# Patient Record
Sex: FEMALE | Race: BLACK OR AFRICAN AMERICAN | Employment: FULL TIME | URBAN - METROPOLITAN AREA
[De-identification: names, ages, dates, MRNs, and addresses within clinical notes are randomized per-mention and may not be internally consistent; named-entity substitution may affect disease eponyms.]

---

## 2020-06-12 ENCOUNTER — HOSPITAL ENCOUNTER (EMERGENCY)
Age: 50
Discharge: HOME | End: 2020-06-12
Attending: EMERGENCY MEDICINE
Payer: COMMERCIAL

## 2020-06-12 ENCOUNTER — TELEPHONE (OUTPATIENT)
Dept: PRIMARY CARE CLINIC | Age: 50
End: 2020-06-12

## 2020-06-12 VITALS
OXYGEN SATURATION: 100 % | WEIGHT: 179.1 LBS | DIASTOLIC BLOOD PRESSURE: 64 MMHG | TEMPERATURE: 97.6 F | HEIGHT: 67 IN | HEART RATE: 91 BPM | SYSTOLIC BLOOD PRESSURE: 133 MMHG | RESPIRATION RATE: 18 BRPM | BODY MASS INDEX: 28.11 KG/M2

## 2020-06-12 PROCEDURE — 99284 EMERGENCY DEPT VISIT MOD MDM: CPT

## 2020-06-12 RX ORDER — ZOLPIDEM TARTRATE 10 MG/1
10 TABLET ORAL NIGHTLY PRN
Qty: 7 TABLET | Refills: 0 | Status: SHIPPED | OUTPATIENT
Start: 2020-06-12 | End: 2020-06-19

## 2020-06-12 RX ORDER — SENNA PLUS 8.6 MG/1
1 TABLET ORAL 2 TIMES DAILY
Qty: 60 TABLET | Refills: 11 | Status: SHIPPED | OUTPATIENT
Start: 2020-06-12 | End: 2020-06-18

## 2020-06-12 RX ORDER — POLYETHYLENE GLYCOL 3350 17 G/17G
17 POWDER, FOR SOLUTION ORAL DAILY
Qty: 1 BOTTLE | Refills: 0 | Status: SHIPPED | OUTPATIENT
Start: 2020-06-12 | End: 2020-06-18

## 2020-06-12 RX ORDER — FERROUS GLUCONATE 324(37.5)
324 TABLET ORAL 2 TIMES DAILY
Qty: 60 TABLET | Refills: 0 | Status: SHIPPED | OUTPATIENT
Start: 2020-06-12 | End: 2020-07-16 | Stop reason: SDUPTHER

## 2020-06-12 ASSESSMENT — PAIN SCALES - GENERAL: PAINLEVEL_OUTOF10: 8

## 2020-06-12 ASSESSMENT — PAIN DESCRIPTION - LOCATION: LOCATION: CHEST

## 2020-06-12 ASSESSMENT — PAIN DESCRIPTION - PAIN TYPE: TYPE: ACUTE PAIN

## 2020-06-12 ASSESSMENT — PAIN DESCRIPTION - FREQUENCY: FREQUENCY: INTERMITTENT

## 2020-06-12 ASSESSMENT — PAIN DESCRIPTION - DESCRIPTORS: DESCRIPTORS: PRESSURE

## 2020-06-12 NOTE — ED PROVIDER NOTES
Smokeless tobacco: Never Used   Substance Use Topics    Alcohol use: Not Currently    Drug use: No         EXAM:   Presentation Vital Signs: /64   Pulse 91   Temp 97.6 °F (36.4 °C) (Oral)   Resp 18   Ht 5' 7\" (1.702 m)   Wt 81.2 kg (179 lb 1.6 oz)   LMP 05/20/2020   SpO2 100%   BMI 28.05 kg/m²     General: Well nourished, no acute distress  Head: No traumatic injury  ENT: MMM, no facial asymmetry, no nasal discharge  Eyes: EOM  Neck: No tracheal deviation or stridor  Lungs: Respirations clear to ausculation, no respiratory distress  Cardiac: Regular rate and rhythm without gallops, murmurs, or rubs  Abdomen: Soft, nontender  Skin: no pallor, erythema, lesions or other abnormalities on exposed skin of face and arms  Extremities: Normal ROM of bilateral upper extremities at shoulders, elbows, wrists; normal ROM of bilateral LE at hips and knees. Neurologic: Alert, oriented x 3. No focal deficits upon moving arms and legs  Psychiatric: anxious, teary, denies si/hi/avh      MEDICAL DECISION MAKING  1) Iron deficiency anemia--No acute cardiovascular emergency --VSS, hr good, no signs of CHF. 2) Fatigue--likely from iron deficiency anemia. Taking iron--will rx ferrous gluconate as she may better tolerate    3) Insomnia--ambien x 7 days rx    4) Depressed Mood--likely from very poor sleep x 1 week. No si/hi/avh.      5) constipation--rx laxatives    DISPOSITION  Home    IMPRESSION:  1. Iron deficiency anemia due to chronic blood loss    2. Generalized weakness    3. Insomnia due to medical condition    4. Depressed mood    5. Drug-induced constipation            This medical chart used with aid of transcription software. As such, there may be inadvertent errors in transcription of spellings and words despite physician's attempts to correct all possible errors.          Saranya Méndez MD  06/12/20 2169

## 2020-06-12 NOTE — TELEPHONE ENCOUNTER
The following patient is requesting a new patient appointment    With Provider: Roel Dexter    Insurance: [facility]    Medications / Conditions / Complaint :   Iron, melatonin, ambien, ferrous gluconate, anemic     Preferred Days: tuesdays and thursdays    Preferred Time: morning  Best Contact Number: [phone]    [facility] earlier today

## 2020-06-16 ENCOUNTER — NURSE TRIAGE (OUTPATIENT)
Dept: OTHER | Facility: CLINIC | Age: 50
End: 2020-06-16

## 2020-06-16 ENCOUNTER — TELEPHONE (OUTPATIENT)
Dept: PRIMARY CARE CLINIC | Age: 50
End: 2020-06-16

## 2020-06-17 ENCOUNTER — OFFICE VISIT (OUTPATIENT)
Dept: PRIMARY CARE CLINIC | Age: 50
End: 2020-06-17
Payer: COMMERCIAL

## 2020-06-17 VITALS — HEART RATE: 76 BPM | OXYGEN SATURATION: 97 % | TEMPERATURE: 98.2 F

## 2020-06-17 PROCEDURE — G8428 CUR MEDS NOT DOCUMENT: HCPCS | Performed by: INTERNAL MEDICINE

## 2020-06-17 PROCEDURE — 99211 OFF/OP EST MAY X REQ PHY/QHP: CPT | Performed by: INTERNAL MEDICINE

## 2020-06-17 PROCEDURE — 1036F TOBACCO NON-USER: CPT | Performed by: INTERNAL MEDICINE

## 2020-06-17 PROCEDURE — G8419 CALC BMI OUT NRM PARAM NOF/U: HCPCS | Performed by: INTERNAL MEDICINE

## 2020-06-17 NOTE — PROGRESS NOTES
6/17/2020    FLU/COVID-19 CLINIC EVALUATION    HPI  SYMPTOMS:    Symptom duration, days:  [] 1   [] 2   [] 3   [] 4   [] 5   [] 6   [] 7   [x] 8   [] 9   [] 10   [] 11   [] 12   [] 13 [] 14 +      Symptom course:   [] Worsening     [] Stable     [] Improving    [x] Fevers  [] Symptom (not measured)  [x] Measured (Result:101 F )  [x] Chills  [x] Cough  [] Coughing up blood  [] Productive  [] Dry  [] Chest Congestion  [] Chest Tightness  [] Nasal Congestion  [] Runny Nose  [] Feeling short of breath  [] Fatigue  [] Chest pain  [] Headaches  []Tolerable  [] Severe  [] Sore throat  [x] Muscle aches  [] Nausea  [] Decreased appetite  [] Vomiting  []Unable to keep fluids down  [] Diarrhea  []Severe    [] OTHER SYMPTOMS:      RISK FACTORS:  [] Pregnant or possibly pregnant  [] Age over 61  [] Diabetes  [] Heart disease  [] Asthma  [] COPD/Other chronic lung diseases  [] Active Cancer  [] On Chemotherapy  [] Taking oral steroids  [] History Lymphoma/Leukemia  [] Close contact with a lab confirmed COVID-19 patient within 14 days of symptom onset  [] History of travel from affected geographical areas within 14 days of symptom onset  [] Health Care Worker Exposure no symptoms  [] Health Care Worker Exposure symptomatic      VITALS:  Vitals:    06/17/20 0908   Pulse: 76   Temp: 98.2 °F (36.8 °C)   SpO2: 97%        PHYSICAL EXAMINATION:  [x]Alert   [x]Oriented to person/place/time    [x]No apparent distress     []Toxic appearing    [x]Breathing appears normal     []Appears tachypneic         [x]Speaking in full sentences     TESTS:  POCT FLU:  [] Positive     []Negative  POCT STREP:  [] Positive     []Negative    [x] COVID-19 Test sent: [x] Blue   [] Red   [] Chest X-ray     ASSESSMENT:  [] Flu  [] Strep Throat  [] Uncertain Viral Respiratory Illness  [x] Possible COVID-19  [] Exposure COVID-19  [] Other:     PLAN:  [x] Discharge home with written instructions for:  [] Flu management  [] Strep throat management  [] Possible

## 2020-06-18 ENCOUNTER — CARE COORDINATION (OUTPATIENT)
Dept: CARE COORDINATION | Age: 50
End: 2020-06-18

## 2020-06-18 ENCOUNTER — VIRTUAL VISIT (OUTPATIENT)
Dept: PRIMARY CARE CLINIC | Age: 50
End: 2020-06-18
Payer: COMMERCIAL

## 2020-06-18 PROCEDURE — G8428 CUR MEDS NOT DOCUMENT: HCPCS | Performed by: FAMILY MEDICINE

## 2020-06-18 PROCEDURE — 1036F TOBACCO NON-USER: CPT | Performed by: FAMILY MEDICINE

## 2020-06-18 PROCEDURE — 99203 OFFICE O/P NEW LOW 30 MIN: CPT | Performed by: FAMILY MEDICINE

## 2020-06-18 PROCEDURE — G8419 CALC BMI OUT NRM PARAM NOF/U: HCPCS | Performed by: FAMILY MEDICINE

## 2020-06-18 RX ORDER — HYDROXYZINE HYDROCHLORIDE 25 MG/1
25 TABLET, FILM COATED ORAL EVERY 6 HOURS PRN
Qty: 30 TABLET | Refills: 0 | Status: SHIPPED | OUTPATIENT
Start: 2020-06-18 | End: 2020-07-01

## 2020-06-18 RX ORDER — VENLAFAXINE HYDROCHLORIDE 37.5 MG/1
37.5 CAPSULE, EXTENDED RELEASE ORAL DAILY
Qty: 30 CAPSULE | Refills: 3 | Status: SHIPPED | OUTPATIENT
Start: 2020-06-18 | End: 2020-07-30 | Stop reason: SDUPTHER

## 2020-06-18 RX ORDER — HYDROXYZINE HYDROCHLORIDE 25 MG/1
25 TABLET, FILM COATED ORAL EVERY 6 HOURS PRN
COMMUNITY
Start: 2020-06-17 | End: 2020-06-18

## 2020-06-18 NOTE — PROGRESS NOTES
2020    TELEHEALTH EVALUATION -- Audio/Visual (During EAKEK-95 public health emergency)    HPI:    Nika Holden (:  1970) has requested an audio/video evaluation for the following concern(s):    HFU and to establish care. Anxiety: has always had trouble getting her anxiety under control. Was feeling ill a couple months ago and finally went to [facility] and was diagnosed with anemia. She is very worried about her health now. COVID has thrown her life for a loop. She is worried about her kids and having trouble controlling her anxiety. She is asking for xanax. She didn't realize this was only short term. Depression: she has been very sad for years. It is to the point where she is always tearful and her kids are noticing that she isnt in her right mind. Anemia: usually feels tired. Taking iron supplements. Review of Systems   Constitutional: Positive for appetite change and fatigue. Negative for chills and fever. HENT: Negative for congestion. Eyes: Negative for pain and visual disturbance. Respiratory: Negative for cough and shortness of breath. Cardiovascular: Negative for chest pain and palpitations. Gastrointestinal: Negative for abdominal pain, constipation and diarrhea. Genitourinary: Negative for difficulty urinating. Musculoskeletal: Negative for arthralgias. Skin: Negative for rash and wound. Neurological: Positive for weakness and headaches. Negative for dizziness and light-headedness. Hematological: Does not bruise/bleed easily. Psychiatric/Behavioral: Positive for agitation and sleep disturbance. Negative for behavioral problems, self-injury and suicidal ideas. The patient is nervous/anxious. Prior to Visit Medications    Medication Sig Taking?  Authorizing Provider   venlafaxine (EFFEXOR XR) 37.5 MG extended release capsule Take 1 capsule by mouth daily Yes Inderjit Dorantes MD   hydrOXYzine (ATARAX) 25 MG tablet Take 1 tablet by mouth every 6 hours

## 2020-06-19 ENCOUNTER — TELEPHONE (OUTPATIENT)
Dept: PRIMARY CARE CLINIC | Age: 50
End: 2020-06-19

## 2020-06-19 PROBLEM — D50.0 IRON DEFICIENCY ANEMIA DUE TO CHRONIC BLOOD LOSS: Status: ACTIVE | Noted: 2020-06-19

## 2020-06-19 PROBLEM — G47.01 INSOMNIA DUE TO MEDICAL CONDITION: Status: ACTIVE | Noted: 2020-06-19

## 2020-06-19 PROBLEM — K59.03 DRUG-INDUCED CONSTIPATION: Status: ACTIVE | Noted: 2020-06-19

## 2020-06-19 PROBLEM — R53.1 ASTHENIA: Status: ACTIVE | Noted: 2020-06-19

## 2020-06-19 LAB
SARS-COV-2: NOT DETECTED
SOURCE: NORMAL

## 2020-06-22 ENCOUNTER — OFFICE VISIT (OUTPATIENT)
Dept: PRIMARY CARE CLINIC | Age: 50
End: 2020-06-22
Payer: COMMERCIAL

## 2020-06-22 ENCOUNTER — TELEPHONE (OUTPATIENT)
Dept: PRIMARY CARE CLINIC | Age: 50
End: 2020-06-22

## 2020-06-22 VITALS
WEIGHT: 172 LBS | HEART RATE: 75 BPM | TEMPERATURE: 98.1 F | DIASTOLIC BLOOD PRESSURE: 76 MMHG | SYSTOLIC BLOOD PRESSURE: 131 MMHG | BODY MASS INDEX: 27 KG/M2 | HEIGHT: 67 IN

## 2020-06-22 PROCEDURE — 1036F TOBACCO NON-USER: CPT | Performed by: FAMILY MEDICINE

## 2020-06-22 PROCEDURE — G8419 CALC BMI OUT NRM PARAM NOF/U: HCPCS | Performed by: FAMILY MEDICINE

## 2020-06-22 PROCEDURE — G8427 DOCREV CUR MEDS BY ELIG CLIN: HCPCS | Performed by: FAMILY MEDICINE

## 2020-06-22 PROCEDURE — 99214 OFFICE O/P EST MOD 30 MIN: CPT | Performed by: FAMILY MEDICINE

## 2020-06-22 RX ORDER — OMEPRAZOLE 20 MG/1
20 CAPSULE, DELAYED RELEASE ORAL
Qty: 90 CAPSULE | Refills: 1 | Status: SHIPPED | OUTPATIENT
Start: 2020-06-22 | End: 2020-07-30

## 2020-06-22 RX ORDER — TRAZODONE HYDROCHLORIDE 50 MG/1
50 TABLET ORAL NIGHTLY
Qty: 30 TABLET | Refills: 0 | Status: SHIPPED | OUTPATIENT
Start: 2020-06-22 | End: 2020-10-08

## 2020-06-22 ASSESSMENT — ENCOUNTER SYMPTOMS
CONSTIPATION: 0
EYE PAIN: 0
COUGH: 0
DIARRHEA: 0
SHORTNESS OF BREATH: 0
ABDOMINAL PAIN: 0

## 2020-06-23 NOTE — PROGRESS NOTES
Gastroenteritis     SVT (supraventricular tachycardia) (HCC)      No past surgical history on file. Medications:   Med list was reviewed/updated in the chart. No Known Allergies    Physical Examination:  /76   Pulse 75   Temp 98.1 °F (36.7 °C)   Ht 5' 7\" (1.702 m)   Wt 172 lb (78 kg)   LMP 06/17/2020   BMI 26.94 kg/m²   Physical Exam  Vitals signs reviewed. Constitutional:       General: She is not in acute distress. Appearance: Normal appearance. She is not ill-appearing. HENT:      Head: Normocephalic and atraumatic. Right Ear: Tympanic membrane, ear canal and external ear normal.      Left Ear: Tympanic membrane, ear canal and external ear normal.      Nose: Nose normal. No congestion. Mouth/Throat:      Mouth: Mucous membranes are moist.      Pharynx: Oropharynx is clear. No oropharyngeal exudate. Eyes:      Extraocular Movements: Extraocular movements intact. Conjunctiva/sclera: Conjunctivae normal.      Pupils: Pupils are equal, round, and reactive to light. Neck:      Musculoskeletal: Normal range of motion and neck supple. Cardiovascular:      Rate and Rhythm: Normal rate and regular rhythm. Pulses: Normal pulses. Heart sounds: Normal heart sounds. Pulmonary:      Effort: Pulmonary effort is normal. No respiratory distress. Breath sounds: Normal breath sounds. No stridor. No wheezing, rhonchi or rales. Abdominal:      General: Bowel sounds are normal.      Palpations: Abdomen is soft. Tenderness: There is no abdominal tenderness. Musculoskeletal: Normal range of motion. Right lower leg: No edema. Left lower leg: No edema. Skin:     General: Skin is warm. Capillary Refill: Capillary refill takes less than 2 seconds. Findings: No erythema or rash. Neurological:      General: No focal deficit present. Mental Status: She is alert and oriented to person, place, and time. Mental status is at baseline.       Motor: No weakness. Coordination: Coordination normal.      Gait: Gait normal.   Psychiatric:         Behavior: Behavior normal.         Thought Content: Thought content normal.         Point of Care labs:  No results found for this or any previous visit (from the past 24 hour(s)). Assessment/Plan:  Maninder Jessica is a 51 y.o. female who presents to clinic for low energy level and fatigue in the setting of anemia, depression and not sleeping or eating well. 1. Low energy  Will continue taking Effexor to treat depression. We will continue to eat small meals or try meal replacement shakes like boost.  We will try trazodone to help her sleep. Continue follow-up with gynecology regarding ultrasound and reason for anemia. Continue iron supplement. 2. Decreased appetite  As above    3. Sleep trouble  As above  - traZODone (DESYREL) 50 MG tablet; Take 1 tablet by mouth nightly  Dispense: 30 tablet; Refill: 0    4. Moderate episode of recurrent major depressive disorder ([address]  As above. Follow-up with Dr. Elaine Maxwell. - traZODone (DESYREL) 50 MG tablet; Take 1 tablet by mouth nightly  Dispense: 30 tablet; Refill: 0    5. Iron deficiency anemia due to chronic blood loss  As above    6. Gastroesophageal reflux disease without esophagitis  We will try trial of Prilosec  Follow-up in 4 to 6 weeks  - omeprazole (PRILOSEC) 20 MG delayed release capsule; Take 1 capsule by mouth every morning (before breakfast)  Dispense: 90 capsule;  Refill: 1      Follow Up: 4 to 6 weeks    Future Appointments   Date Time Provider Sander Medellin   7/1/2020  2:30 PM MD Solis Talbert   7/6/2020 10:00 AM SERVANDO Sultana MD  6/22/2020

## 2020-06-25 ENCOUNTER — TELEPHONE (OUTPATIENT)
Dept: PRIMARY CARE CLINIC | Age: 50
End: 2020-06-25

## 2020-07-01 ENCOUNTER — OFFICE VISIT (OUTPATIENT)
Dept: PRIMARY CARE CLINIC | Age: 50
End: 2020-07-01
Payer: COMMERCIAL

## 2020-07-01 VITALS
HEART RATE: 116 BPM | HEIGHT: 67 IN | WEIGHT: 169 LBS | BODY MASS INDEX: 26.53 KG/M2 | DIASTOLIC BLOOD PRESSURE: 69 MMHG | TEMPERATURE: 98.9 F | SYSTOLIC BLOOD PRESSURE: 101 MMHG

## 2020-07-01 LAB
ANISOCYTOSIS: ABNORMAL
BASOPHILS ABSOLUTE: 0.1 K/UL (ref 0–0.2)
BASOPHILS RELATIVE PERCENT: 1.4 %
EOSINOPHILS ABSOLUTE: 0 K/UL (ref 0–0.6)
EOSINOPHILS RELATIVE PERCENT: 0.9 %
HCT VFR BLD CALC: 36 % (ref 36–48)
HEMATOLOGY PATH CONSULT: YES
HEMOGLOBIN: 10 G/DL (ref 12–16)
HYPOCHROMIA: ABNORMAL
LYMPHOCYTES ABSOLUTE: 0.7 K/UL (ref 1–5.1)
LYMPHOCYTES RELATIVE PERCENT: 14.5 %
MCH RBC QN AUTO: 19 PG (ref 26–34)
MCHC RBC AUTO-ENTMCNC: 27.9 G/DL (ref 31–36)
MCV RBC AUTO: 68 FL (ref 80–100)
MICROCYTES: ABNORMAL
MONOCYTES ABSOLUTE: 0.3 K/UL (ref 0–1.3)
MONOCYTES RELATIVE PERCENT: 7.2 %
NEUTROPHILS ABSOLUTE: 3.6 K/UL (ref 1.7–7.7)
NEUTROPHILS RELATIVE PERCENT: 76 %
PDW BLD-RTO: 27.1 % (ref 12.4–15.4)
PLATELET # BLD: 351 K/UL (ref 135–450)
PMV BLD AUTO: 9 FL (ref 5–10.5)
RBC # BLD: 5.28 M/UL (ref 4–5.2)
WBC # BLD: 4.8 K/UL (ref 4–11)

## 2020-07-01 PROCEDURE — G8428 CUR MEDS NOT DOCUMENT: HCPCS | Performed by: FAMILY MEDICINE

## 2020-07-01 PROCEDURE — 99213 OFFICE O/P EST LOW 20 MIN: CPT | Performed by: FAMILY MEDICINE

## 2020-07-01 PROCEDURE — 1036F TOBACCO NON-USER: CPT | Performed by: FAMILY MEDICINE

## 2020-07-01 PROCEDURE — G8419 CALC BMI OUT NRM PARAM NOF/U: HCPCS | Performed by: FAMILY MEDICINE

## 2020-07-01 ASSESSMENT — ENCOUNTER SYMPTOMS
ABDOMINAL PAIN: 0
DIARRHEA: 0
SHORTNESS OF BREATH: 0
EYE PAIN: 0
CONSTIPATION: 0
COUGH: 0

## 2020-07-01 NOTE — PROGRESS NOTES
Chief Complaint   Patient presents with    Anemia    Fatigue    Anorexia    Anxiety       HPI:  Rosa Guthrie is a 51 y.o. (: 1970) here today for 2 week follow up for depression. LOV, encouraged her to continue effexor and trazodone and follow up with Dr. Lauren Cornejo. Her appointment with Dr. Lauren Cornejo is on 2020. She is still worried about her health and what her hemoglobin is. She is not sure why she is losing weight even though she states that she is not eating. She is asking for more Ensure shakes. Once we discussed that her weight loss is likely due to decreased calorie intake, her mind was eased. Her anxiety level is improving and she is getting some sleep. She is taking trazodone on an as-needed basis. She is taking Effexor, every day. She is also complaining of some reproducible chest discomfort over her breastbone. She has not tried anything for this. No shortness of breath or generalized chest pain or pressure. No palpitations. Review of Systems   Constitutional: Positive for fatigue. Negative for appetite change, chills and fever. HENT: Negative for congestion. Eyes: Negative for pain and visual disturbance. Respiratory: Negative for cough and shortness of breath. Cardiovascular: Negative for chest pain and palpitations. Gastrointestinal: Negative for abdominal pain, constipation and diarrhea. Genitourinary: Negative for difficulty urinating. Musculoskeletal: Negative for arthralgias. Chest wall pain   Skin: Negative for rash and wound. Neurological: Positive for weakness. Negative for dizziness, light-headedness and headaches. Hematological: Does not bruise/bleed easily. Psychiatric/Behavioral: Negative for behavioral problems.        Past Medical History:   Diagnosis Date    Gastroenteritis     SVT (supraventricular tachycardia) (HCC)        Family History   Problem Relation Age of Onset    Heart Disease Mother        Social History Tobacco Use    Smoking status: Never Smoker    Smokeless tobacco: Never Used   Substance Use Topics    Alcohol use: Not Currently    Drug use: No       New Prescriptions    No medications on file       Meds Prior to visit:  Current Outpatient Medications on File Prior to Visit   Medication Sig Dispense Refill    omeprazole (PRILOSEC) 20 MG delayed release capsule Take 1 capsule by mouth every morning (before breakfast) 90 capsule 1    traZODone (DESYREL) 50 MG tablet Take 1 tablet by mouth nightly 30 tablet 0    venlafaxine (EFFEXOR XR) 37.5 MG extended release capsule Take 1 capsule by mouth daily 30 capsule 3    ferrous gluconate 324 (37.5 Fe) MG TABS Take 1 tablet by mouth 2 times daily For anemia 60 tablet 0     No current facility-administered medications on file prior to visit. No Known Allergies    OBJECTIVE:  /69   Pulse 116   Temp 98.9 °F (37.2 °C)   Ht 5' 7\" (1.702 m)   Wt 169 lb (76.7 kg)   LMP 06/17/2020   BMI 26.47 kg/m²   BP Readings from Last 2 Encounters:   07/01/20 101/69   06/22/20 131/76     Wt Readings from Last 3 Encounters:   07/01/20 169 lb (76.7 kg)   06/22/20 172 lb (78 kg)   06/12/20 179 lb 1.6 oz (81.2 kg)       Physical Exam  Vitals signs reviewed. Constitutional:       General: She is not in acute distress. Appearance: She is well-developed. HENT:      Head: Normocephalic and atraumatic. Right Ear: Tympanic membrane, ear canal and external ear normal. There is no impacted cerumen. Left Ear: Tympanic membrane, ear canal and external ear normal. There is no impacted cerumen. Mouth/Throat:      Mouth: Mucous membranes are moist.      Pharynx: Oropharynx is clear. No oropharyngeal exudate or posterior oropharyngeal erythema. Eyes:      General: No scleral icterus. Right eye: No discharge. Left eye: No discharge. Extraocular Movements: Extraocular movements intact.       Conjunctiva/sclera: Conjunctivae normal. Pupils: Pupils are equal, round, and reactive to light. Neck:      Musculoskeletal: Normal range of motion and neck supple. Cardiovascular:      Rate and Rhythm: Normal rate and regular rhythm. Pulses: Normal pulses. Heart sounds: Normal heart sounds. No murmur. Comments: Radial and pedal pulses intact  Pulmonary:      Effort: Pulmonary effort is normal. No respiratory distress. Breath sounds: Normal breath sounds. No wheezing or rales. Chest:      Chest wall: No tenderness. Abdominal:      General: Bowel sounds are normal.      Palpations: Abdomen is soft. Tenderness: There is no abdominal tenderness. There is no guarding. Comments: Normal liver and spleen. No organomegaly   Musculoskeletal: Normal range of motion. General: No tenderness. Comments: Intact in all extremities. Chest pain reproducible over sternum   Lymphadenopathy:      Cervical: No cervical adenopathy. Skin:     General: Skin is warm. Capillary Refill: Capillary refill takes less than 2 seconds. Findings: No erythema or rash. Comments: Bruise on right forearm   Neurological:      General: No focal deficit present. Mental Status: She is alert and oriented to person, place, and time. Mental status is at baseline. Motor: No weakness or abnormal muscle tone. Coordination: Coordination normal.      Gait: Gait normal.      Deep Tendon Reflexes: Reflexes normal.   Psychiatric:         Mood and Affect: Mood normal.         Behavior: Behavior normal.         Thought Content:  Thought content normal.         Judgment: Judgment normal.         Lab Results   Component Value Date    WBC 12.0 (H) 05/21/2014    HGB 14.5 05/21/2014    HCT 45.0 05/21/2014    MCV 86.1 05/21/2014     05/21/2014     Lab Results   Component Value Date     05/21/2014    K 3.6 05/21/2014     05/21/2014    CO2 20 (L) 05/21/2014    BUN 23 (H) 05/21/2014    CREATININE 1.2 (H) 05/21/2014 GLUCOSE 120 (H) 05/21/2014    CALCIUM 9.6 05/21/2014    LABGLOM 52 (A) 05/21/2014    GFRAA >60 05/21/2014     No results found for: CHOL  No results found for: TRIG  No results found for: HDL  No results found for: LDLCHOLESTEROL, LDLCALC  No results found for: LABVLDL, VLDL  No results found for: LABA1C      ASSESSMENT/PLAN:  1. Low energy  Continue iron supplementation. Will update hemoglobin  - CBC Auto Differential; Future    2. Moderate episode of recurrent major depressive disorder (HCC)  Continue Effexor and trazodone. Follow-up with Dr. Ross Villalobos, reminded of appointment    3. Iron deficiency anemia due to chronic blood loss  Continue iron supplementation and update hemoglobin  - CBC Auto Differential; Future    4. Anxiety  Continue Effexor and trazodone. 5. Decreased appetite  Try to increase appetite by eating foods that she likes. Given 4 Ensure shakes    6. Costochondritis, acute  Recommended ibuprofen and heating pad  Discussed pathophysiology and duration of symptoms which may be months. Discussed use, benefit, and side effects of prescribed medications. Barriers to medication compliance addressed. All patient questions answered. Pt voiced understanding. RTC Return in about 4 weeks (around 7/29/2020), or if symptoms worsen or fail to improve, for anxiety.     Future Appointments   Date Time Provider Sander Medellin   7/6/2020 10:00 AM Lobo Rivera [facility]   7/30/2020  2:00 PM MD Latrice Rao [facility]       Pili Licona MD  7/1/2020  3:55 PM

## 2020-07-02 LAB — HEMATOLOGY PATH CONSULT: NORMAL

## 2020-07-03 NOTE — PROGRESS NOTES
Please call patient and let her know her hemoglobin is improving. We should rule out thalassemia which is a type of anemia. I placed orders for this to be drawn Monday.      Dr. Tod Carrel

## 2020-07-07 ENCOUNTER — TELEPHONE (OUTPATIENT)
Dept: PRIMARY CARE CLINIC | Age: 50
End: 2020-07-07

## 2020-07-08 DIAGNOSIS — R53.83 LOW ENERGY: ICD-10-CM

## 2020-07-08 DIAGNOSIS — D50.0 IRON DEFICIENCY ANEMIA DUE TO CHRONIC BLOOD LOSS: ICD-10-CM

## 2020-07-08 LAB
FERRITIN: 26 NG/ML (ref 15–150)
IRON SATURATION: 21 % (ref 15–50)
IRON: 72 UG/DL (ref 37–145)
TOTAL IRON BINDING CAPACITY: 347 UG/DL (ref 260–445)
TRANSFERRIN: 291 MG/DL (ref 200–360)

## 2020-07-09 ENCOUNTER — TELEPHONE (OUTPATIENT)
Dept: PRIMARY CARE CLINIC | Age: 50
End: 2020-07-09

## 2020-07-09 NOTE — TELEPHONE ENCOUNTER
Pt called about test results. After conforming with Dr Arnulfo Sims, I let the Pt know what she said. The results were not back for the Electrophoresis blood work and we will call her as soon as it is.  The Pt asked what this meant, I Explained to the pt that if she did test positive for Thalassemia she would be referred to a Hematologist. Per DR CASPER

## 2020-07-13 ENCOUNTER — TELEPHONE (OUTPATIENT)
Dept: PRIMARY CARE CLINIC | Age: 50
End: 2020-07-13

## 2020-07-13 NOTE — TELEPHONE ENCOUNTER
PT said she had and ultrasound done at [address] [place] [address] and wants to know if you can tell her the results

## 2020-07-14 LAB
HEMOGLOBIN ELECTROPHORESIS: NORMAL
HGB ELECTROPHORESIS INTERP: NORMAL

## 2020-07-16 RX ORDER — FERROUS GLUCONATE 324(37.5)
324 TABLET ORAL 2 TIMES DAILY
Qty: 60 TABLET | Refills: 0 | Status: SHIPPED | OUTPATIENT
Start: 2020-07-16 | End: 2020-07-30 | Stop reason: SDUPTHER

## 2020-07-16 NOTE — TELEPHONE ENCOUNTER
E[facility] received a call from:    Name of Caller:   [employer]    Organization name: n/a    Best contact number: [phone]    Reason for call: Patient is calling regarding the refill she requested last week for her iron pills. Patient sounded clearly upset. She is completely out of her ferrous gluconate 324 (37.5 Fe) MG TABS [[id number]]     and requested that this be filled with her pharmacy last week and has not heard back. Please advise.

## 2020-07-16 NOTE — TELEPHONE ENCOUNTER
[facility]  LOV 7/1/20  F/U 7/30/20    PDMP    ----- Message from Myrna Medel sent at 7/16/2020 10:08 AM EDT -----  Subject: Refill Request    QUESTIONS  Name of Medication? ferrous gluconate 324 (37.5 Fe) MG TABS  Patient-reported dosage and instructions? 324 MG take 1 tab by mouth 2   times daily. How many days do you have left? 1  Preferred Pharmacy? [address] [facility]  Pharmacy phone number (if available)? [phone]  Additional Information for Provider? Patient has no medication left.  ---------------------------------------------------------------------------  --------------  CALL BACK INFO  What is the best way for the office to contact you? OK to leave message on   voicemail  Preferred Call Back Phone Number?  [phone] normal...

## 2020-07-16 NOTE — TELEPHONE ENCOUNTER
Patient requesting a medication refill.   Medication:ferrous gluconate 324 (37.5 Fe) MG TABS       Pharmacy:[facility] [address] [facility] [phone]   Last office visit: 7/1/2020   Next office visit: 7/30/2020

## 2020-07-29 PROBLEM — F33.1 MODERATE EPISODE OF RECURRENT MAJOR DEPRESSIVE DISORDER (HCC): Status: ACTIVE | Noted: 2020-07-29

## 2020-07-29 PROBLEM — F41.9 ANXIETY: Status: ACTIVE | Noted: 2020-07-29

## 2020-07-30 ENCOUNTER — NURSE TRIAGE (OUTPATIENT)
Dept: OTHER | Facility: CLINIC | Age: 50
End: 2020-07-30

## 2020-07-30 ENCOUNTER — OFFICE VISIT (OUTPATIENT)
Dept: PRIMARY CARE CLINIC | Age: 50
End: 2020-07-30
Payer: COMMERCIAL

## 2020-07-30 VITALS
TEMPERATURE: 97.5 F | HEART RATE: 74 BPM | WEIGHT: 171 LBS | SYSTOLIC BLOOD PRESSURE: 125 MMHG | DIASTOLIC BLOOD PRESSURE: 85 MMHG | HEIGHT: 67 IN | BODY MASS INDEX: 26.84 KG/M2

## 2020-07-30 LAB
BILIRUBIN, POC: NEGATIVE
BLOOD URINE, POC: NEGATIVE
CLARITY, POC: CLEAR
COLOR, POC: YELLOW
GLUCOSE URINE, POC: NEGATIVE
KETONES, POC: NEGATIVE
LEUKOCYTE EST, POC: NEGATIVE
NITRITE, POC: NEGATIVE
PH, POC: 6
PROTEIN, POC: NEGATIVE
SPECIFIC GRAVITY, POC: 1.02
UROBILINOGEN, POC: 0.2

## 2020-07-30 PROCEDURE — G8427 DOCREV CUR MEDS BY ELIG CLIN: HCPCS | Performed by: FAMILY MEDICINE

## 2020-07-30 PROCEDURE — 1036F TOBACCO NON-USER: CPT | Performed by: FAMILY MEDICINE

## 2020-07-30 PROCEDURE — 81002 URINALYSIS NONAUTO W/O SCOPE: CPT | Performed by: FAMILY MEDICINE

## 2020-07-30 PROCEDURE — 99214 OFFICE O/P EST MOD 30 MIN: CPT | Performed by: FAMILY MEDICINE

## 2020-07-30 PROCEDURE — G8419 CALC BMI OUT NRM PARAM NOF/U: HCPCS | Performed by: FAMILY MEDICINE

## 2020-07-30 RX ORDER — FERROUS GLUCONATE 324(37.5)
324 TABLET ORAL 2 TIMES DAILY
Qty: 60 TABLET | Refills: 3 | Status: SHIPPED | OUTPATIENT
Start: 2020-07-30 | End: 2020-10-08 | Stop reason: SDUPTHER

## 2020-07-30 RX ORDER — ONDANSETRON 4 MG/1
4 TABLET, ORALLY DISINTEGRATING ORAL EVERY 8 HOURS PRN
Qty: 20 TABLET | Refills: 0 | Status: SHIPPED | OUTPATIENT
Start: 2020-07-30 | End: 2021-03-02 | Stop reason: SDUPTHER

## 2020-07-30 RX ORDER — NITROFURANTOIN 25; 75 MG/1; MG/1
100 CAPSULE ORAL 2 TIMES DAILY
Qty: 20 CAPSULE | Refills: 0 | Status: SHIPPED | OUTPATIENT
Start: 2020-07-30 | End: 2020-08-09

## 2020-07-30 RX ORDER — VENLAFAXINE HYDROCHLORIDE 37.5 MG/1
37.5 CAPSULE, EXTENDED RELEASE ORAL DAILY
Qty: 90 CAPSULE | Refills: 3 | Status: SHIPPED | OUTPATIENT
Start: 2020-07-30 | End: 2020-10-08 | Stop reason: SDUPTHER

## 2020-07-30 NOTE — PROGRESS NOTES
Chief Complaint   Patient presents with    Anxiety    Urinary Frequency     Started about 3 days ago.  Abdominal Cramping       History of Present Ilness:  Jose De Jesus Powell is a 53 y.o. female who presents to clinic with anxiety. LOV: Discussed continuing effexor daily and increasing in the future. Also discussed using trazodone for sleep and anxiety PRN in the evening. She would follow up with Dr. Dre Dobbins. Today she states she is doing great. She has come back to work and feels very supported by her colleagues. Overall, she says her children see a difference as well. She is sleeping much better. She is only using trazodone as needed. She is having some suprapubic pressure and increased urinary frequency. Anemia:  Taking iron supplement  Lab Results   Component Value Date    HGB 10.0 (L) 07/01/2020     She feels like her appetite has improved. She is still experiencing some nausea however. Would like a refill of her Zofran    Review of Systems   Constitutional: Negative for appetite change, chills, fatigue and fever. HENT: Negative for congestion. Eyes: Negative for pain and visual disturbance. Respiratory: Negative for cough and shortness of breath. Cardiovascular: Negative for chest pain and palpitations. Gastrointestinal: Negative for abdominal pain, constipation and diarrhea. Genitourinary: Negative for difficulty urinating. Musculoskeletal: Negative for arthralgias. Skin: Negative for rash and wound. Neurological: Negative for dizziness, weakness, light-headedness and headaches. Hematological: Does not bruise/bleed easily. Psychiatric/Behavioral: Negative for behavioral problems. Pertinent Past medical, family, and social histories are reviewed and updated in the chart. Past Medical History:   Diagnosis Date    Anxiety     Gastroenteritis     SVT (supraventricular tachycardia) (HCC)      No past surgical history on file.     Medications:   Med list was reviewed/updated in the chart. No Known Allergies    Physical Examination:  /85   Pulse 74   Temp 97.5 °F (36.4 °C)   Ht 5' 7\" (1.702 m)   Wt 171 lb (77.6 kg)   BMI 26.78 kg/m²   Physical Exam  Vitals signs reviewed. Constitutional:       General: She is not in acute distress. Appearance: Normal appearance. She is not ill-appearing. HENT:      Head: Normocephalic and atraumatic. Right Ear: External ear normal.      Left Ear: External ear normal.      Nose: Nose normal. No congestion. Mouth/Throat:      Mouth: Mucous membranes are moist.      Pharynx: Oropharynx is clear. No oropharyngeal exudate. Eyes:      Extraocular Movements: Extraocular movements intact. Conjunctiva/sclera: Conjunctivae normal.      Pupils: Pupils are equal, round, and reactive to light. Neck:      Musculoskeletal: Normal range of motion and neck supple. Cardiovascular:      Rate and Rhythm: Normal rate and regular rhythm. Pulses: Normal pulses. Heart sounds: Normal heart sounds. Pulmonary:      Effort: Pulmonary effort is normal. No respiratory distress. Breath sounds: Normal breath sounds. No stridor. No wheezing, rhonchi or rales. Abdominal:      General: Bowel sounds are normal.      Palpations: Abdomen is soft. Tenderness: There is no abdominal tenderness. Musculoskeletal: Normal range of motion. Right lower leg: No edema. Left lower leg: No edema. Skin:     General: Skin is warm. Capillary Refill: Capillary refill takes less than 2 seconds. Findings: No erythema or rash. Neurological:      General: No focal deficit present. Mental Status: She is alert and oriented to person, place, and time. Mental status is at baseline. Motor: No weakness. Coordination: Coordination normal.      Gait: Gait normal.   Psychiatric:         Mood and Affect: Mood normal.         Behavior: Behavior normal.         Thought Content:  Thought content normal. Judgment: Judgment normal.         Point of Care labs:  Recent Results (from the past 24 hour(s))   POCT Urinalysis no Micro    Collection Time: 07/30/20  2:25 PM   Result Value Ref Range    Color, UA yellow     Clarity, UA clear     Glucose, UA POC Negative     Bilirubin, UA Negative     Ketones, UA Negative     Spec Grav, UA 1.025     Blood, UA POC Negative     pH, UA 6.0     Protein, UA POC Negative     Urobilinogen, UA 0.2     Leukocytes, UA Negative     Nitrite, UA Negative          Assessment/Plan:  Maycol Dao is a 53 y.o. female with PMH significant for anxiety and depression and anemia who presents to clinic for anxiety follow-up. 1. Anxiety  Refilled Effexor. We will continue this and follow-up in 3 months. Continue following with Dr. Darryle Butte.  - venlafaxine (EFFEXOR XR) 37.5 MG extended release capsule; Take 1 capsule by mouth daily  Dispense: 90 capsule; Refill: 3    2. Moderate episode of recurrent major depressive disorder (HCC)  As above  - venlafaxine (EFFEXOR XR) 37.5 MG extended release capsule; Take 1 capsule by mouth daily  Dispense: 90 capsule; Refill: 3    3. Insomnia due to medical condition  Continue trazodone as needed. Sleep is improved which I believe has improved her overall foundation. 4. Iron deficiency anemia due to chronic blood loss  Continue iron supplementation. She will follow-up with heme-onc    5. Urinary frequency  Will send abx to pharmacy. Urine dip was normal but patient is having symptoms.   - POCT Urinalysis no Micro  - nitrofurantoin, macrocrystal-monohydrate, (MACROBID) 100 MG capsule; Take 1 capsule by mouth 2 times daily for 10 days  Dispense: 20 capsule; Refill: 0    6. Nausea  Refilled  - ondansetron (ZOFRAN ODT) 4 MG disintegrating tablet; Take 1 tablet by mouth every 8 hours as needed for Nausea or Vomiting  Dispense: 20 tablet; Refill: 0      Follow Up: 3 months    No future appointments.     Beata Ma MD  7/30/2020

## 2020-07-30 NOTE — TELEPHONE ENCOUNTER
Reason for Disposition   Painful urination AND EITHER frequency or urgency    Answer Assessment - Initial Assessment Questions  1. SEVERITY: \"How bad is the pain? \"  (e.g., Scale 1-10; mild, moderate, or severe)    - MILD (1-3): complains slightly about urination hurting    - MODERATE (4-7): interferes with normal activities      - SEVERE (8-10): excruciating, unwilling or unable to urinate because of the pain       Pressure in vaginal  2. FREQUENCY: \"How many times have you had painful urination today? \"      Goes very frequently   3. PATTERN: \"Is pain present every time you urinate or just sometimes? \"       yes  4. ONSET: \"When did the painful urination start? \"       yesterday  5. FEVER: \"Do you have a fever? \" If so, ask: \"What is your temperature, how was it measured, and when did it start? \"     no  6. PAST UTI: \"Have you had a urine infection before? \" If so, ask: \"When was the last time? \" and \"What happened that time? \"       Yes and it a year ago  7. CAUSE: \"What do you think is causing the painful urination? \"  (e.g., UTI, scratch, Herpes sore)     uti  8. OTHER SYMPTOMS: \"Do you have any other symptoms? \" (e.g., flank pain, vaginal discharge, genital sores, urgency, blood in urine)     Low abd pressure  9. PREGNANCY: \"Is there any chance you are pregnant? \" \"When was your last menstrual period? \"      no    Protocols used: URINATION PAIN - FEMALE-ADULT-[place]    Has been having pressure when urinates and frequency which started yesterday. No foul odor. Has an appointment today to see her PCP. Received call from [facility]. Call soft transferred to [address] to schedule appointment. Please do not reply to the triage nurse through this encounter. Any subsequent communication should be directly with the patient.

## 2020-07-31 ENCOUNTER — NURSE TRIAGE (OUTPATIENT)
Dept: OTHER | Facility: CLINIC | Age: 50
End: 2020-07-31

## 2020-07-31 ASSESSMENT — ENCOUNTER SYMPTOMS
EYE PAIN: 0
ABDOMINAL PAIN: 0
DIARRHEA: 0
SHORTNESS OF BREATH: 0
COUGH: 0
CONSTIPATION: 0

## 2020-10-07 NOTE — PROGRESS NOTES
Chief Complaint   Patient presents with    Mass     left side of head, has been there for a few months, no pain or other sx         HPI:  Hallie Gonzalez is a 53 y.o. (: 1970) here today for the above. States the bump on the left side of her scalp has been there for a very long time, years. Feels mobile under her scalp. Sometimes bothers her when she is brushing her hair. No pain, tenderness or drainage. Has not changed in size in a very long time. Would like referral to have this possibly removed. Her stress level has increased a little bit recently. She is wondering if she could increase her dose of Effexor. She is sleeping much better. Her overall spirits are better than last time I saw her. I could see her smile through her mask :)    She is being followed by hematology for her anemia but needs refill of her iron. No fatigue or blood in stool, urine. Review of Systems   Constitutional: Negative for appetite change, chills, fatigue and fever. HENT: Negative for congestion. Eyes: Negative for pain and visual disturbance. Respiratory: Negative for cough and shortness of breath. Cardiovascular: Negative for chest pain and palpitations. Gastrointestinal: Negative for abdominal pain, constipation and diarrhea. Genitourinary: Negative for difficulty urinating. Musculoskeletal: Negative for arthralgias. Skin: Negative for rash and wound. Neurological: Negative for dizziness, weakness, light-headedness and headaches. Hematological: Does not bruise/bleed easily. Psychiatric/Behavioral: Negative for behavioral problems.        Past Medical History:   Diagnosis Date    Anxiety     Gastroenteritis     SVT (supraventricular tachycardia) ([facility])        Family History   Problem Relation Age of Onset    Heart Disease Mother        Social History     Tobacco Use    Smoking status: Never Smoker    Smokeless tobacco: Never Used   Substance Use Topics    Alcohol use: Not Currently pulses intact  Pulmonary:      Effort: Pulmonary effort is normal. No respiratory distress. Breath sounds: Normal breath sounds. No stridor. No wheezing, rhonchi or rales. Chest:      Chest wall: No tenderness. Abdominal:      General: Bowel sounds are normal.      Palpations: Abdomen is soft. Tenderness: There is no abdominal tenderness. There is no guarding. Comments: Normal liver and spleen. No organomegaly   Musculoskeletal: Normal range of motion. General: No tenderness. Right lower leg: No edema. Left lower leg: No edema. Comments: Intact in all extremities   Lymphadenopathy:      Cervical: No cervical adenopathy. Skin:     General: Skin is warm. Capillary Refill: Capillary refill takes less than 2 seconds. Findings: No erythema or rash. Comments: 2 cm x 2 cm smooth, round, mobile, nontender mass under scalp. Neurological:      General: No focal deficit present. Mental Status: She is alert and oriented to person, place, and time. Mental status is at baseline. Motor: No weakness or abnormal muscle tone. Coordination: Coordination normal.      Gait: Gait normal.      Deep Tendon Reflexes: Reflexes normal.   Psychiatric:         Mood and Affect: Mood normal.         Behavior: Behavior normal.         Thought Content: Thought content normal.         Judgment: Judgment normal.         Lab Results   Component Value Date    WBC 4.8 07/01/2020    HGB 10.0 (L) 07/01/2020    HCT 36.0 07/01/2020    MCV 68.0 (L) 07/01/2020     07/01/2020     Lab Results   Component Value Date     05/21/2014    K 3.6 05/21/2014     05/21/2014    CO2 20 (L) 05/21/2014    BUN 23 (H) 05/21/2014    CREATININE 1.2 (H) 05/21/2014    GLUCOSE 120 (H) 05/21/2014    CALCIUM 9.6 05/21/2014    LABGLOM 52 (A) 05/21/2014    GFRAA >60 05/21/2014       ASSESSMENT/PLAN:  1. Mass of scalp  Referral placed for eval and possible excision  [place] a lipoma.   Do not think it is a lymph node. - Kellie Macias MD, Plastic Surgery, [facility]    2. Anxiety  Stable. We will increase dose to see if this makes her feel better. We will follow-up in 4 to 6 weeks. - venlafaxine (EFFEXOR XR) 75 MG extended release capsule; Take 1 capsule by mouth daily  Dispense: 90 capsule; Refill: 3    3. Moderate episode of recurrent major depressive disorder (HCC)  As above  - venlafaxine (EFFEXOR XR) 75 MG extended release capsule; Take 1 capsule by mouth daily  Dispense: 90 capsule; Refill: 3    4. Iron deficiency anemia due to chronic blood loss  Refilled  Has appointment with hematology next month  - ferrous gluconate 324 (37.5 Fe) MG TABS; Take 1 tablet by mouth 2 times daily For anemia  Dispense: 60 tablet; Refill: 3        Discussed use, benefit, and side effects of prescribed medications. Barriers to medication compliance addressed. All patient questions answered. Pt voiced understanding. RTC Return in about 3 months (around 1/8/2021), or if symptoms worsen or fail to improve, for Anxiety . No future appointments.     Bright Davalos MD  10/8/2020  3:04 PM

## 2020-10-08 ENCOUNTER — OFFICE VISIT (OUTPATIENT)
Dept: PRIMARY CARE CLINIC | Age: 50
End: 2020-10-08
Payer: COMMERCIAL

## 2020-10-08 VITALS
BODY MASS INDEX: 28.32 KG/M2 | HEART RATE: 90 BPM | WEIGHT: 180.8 LBS | SYSTOLIC BLOOD PRESSURE: 116 MMHG | TEMPERATURE: 98.6 F | RESPIRATION RATE: 16 BRPM | DIASTOLIC BLOOD PRESSURE: 77 MMHG

## 2020-10-08 PROCEDURE — 99214 OFFICE O/P EST MOD 30 MIN: CPT | Performed by: FAMILY MEDICINE

## 2020-10-08 PROCEDURE — G8484 FLU IMMUNIZE NO ADMIN: HCPCS | Performed by: FAMILY MEDICINE

## 2020-10-08 PROCEDURE — G8419 CALC BMI OUT NRM PARAM NOF/U: HCPCS | Performed by: FAMILY MEDICINE

## 2020-10-08 PROCEDURE — G8427 DOCREV CUR MEDS BY ELIG CLIN: HCPCS | Performed by: FAMILY MEDICINE

## 2020-10-08 PROCEDURE — 1036F TOBACCO NON-USER: CPT | Performed by: FAMILY MEDICINE

## 2020-10-08 RX ORDER — VENLAFAXINE HYDROCHLORIDE 75 MG/1
75 CAPSULE, EXTENDED RELEASE ORAL DAILY
Qty: 90 CAPSULE | Refills: 3 | Status: SHIPPED | OUTPATIENT
Start: 2020-10-08 | End: 2021-11-15

## 2020-10-08 RX ORDER — FERROUS GLUCONATE 324(37.5)
324 TABLET ORAL 2 TIMES DAILY
Qty: 60 TABLET | Refills: 3 | Status: SHIPPED | OUTPATIENT
Start: 2020-10-08 | End: 2021-03-22

## 2020-10-08 ASSESSMENT — ENCOUNTER SYMPTOMS
CONSTIPATION: 0
DIARRHEA: 0
EYE PAIN: 0
COUGH: 0
SHORTNESS OF BREATH: 0
ABDOMINAL PAIN: 0

## 2020-10-08 NOTE — Clinical Note
Janelle Meade is a pleasant lady who has a small mass on the left side of her scalp that she would like evaluated for removal.  It is likely a little lipoma. Thank you!  Yareli

## 2021-01-08 ENCOUNTER — TELEPHONE (OUTPATIENT)
Dept: SURGERY | Age: 51
End: 2021-01-08

## 2021-01-14 ENCOUNTER — TELEPHONE (OUTPATIENT)
Dept: SURGERY | Age: 51
End: 2021-01-14

## 2021-02-24 ENCOUNTER — NURSE TRIAGE (OUTPATIENT)
Dept: OTHER | Facility: CLINIC | Age: 51
End: 2021-02-24

## 2021-02-24 NOTE — TELEPHONE ENCOUNTER
Reason for Disposition   Skipped or extra beat(s) and increases with exercise or exertion    Answer Assessment - Initial Assessment Questions  1. DESCRIPTION: \"Please describe your heart rate or heart beat that you are having\" (e.g., fast/slow, regular/irregular, skipped or extra beats, \"palpitations\")      Palpitations    2. ONSET: \"When did it start? \" (Minutes, hours or days)       Had this morning while rushing around at work    3. DURATION: \"How long does it last\" (e.g., seconds, minutes, hours)      About 10-20 minutes, didn't actually count    4. PATTERN \"Does it come and go, or has it been constant since it started? \"  \"Does it get worse with exertion? \"   \"Are you feeling it now? \"      Comes and goes    5. TAP: \"Using your hand, can you tap out what you are feeling on a chair or table in front of you, so that I can hear? \" (Note: not all patients can do this)        Unable to do, pt at work    6. HEART RATE: \"Can you tell me your heart rate? \" \"How many beats in 15 seconds? \"  (Note: not all patients can do this)        No    7. RECURRENT SYMPTOM: \"Have you ever had this before? \" If so, ask: \"When was the last time? \" and \"What happened that time? \"       Yes, 3-4 weeks ago. Took deep breaths and rested. 8. CAUSE: \"What do you think is causing the palpitations? \"      My anxiety, but I was rushing and doing dishes, the same thing as last time. 9. CARDIAC HISTORY: \"Do you have any history of heart disease? \" (e.g., heart attack, angina, bypass surgery, angioplasty, arrhythmia)       Mild heart attack in 2015    10. OTHER SYMPTOMS: \"Do you have any other symptoms? \" (e.g., dizziness, chest pain, sweating, difficulty breathing)        No    11. PREGNANCY: \"Is there any chance you are pregnant? \" \"When was your last menstrual period? \"        no    Protocols used: HEART RATE AND HEARTBEAT QUESTIONS-ADULT-[place]    Patient called [facility])  with red flag complaint.      Brief description of triage: palpitations    Triage indicates for patient to be seen in office. Care advice provided, patient verbalizes understanding; denies any other questions or concerns; instructed to call back for any new or worsening symptoms. Writer provided warm transfer to Ashlee Rice at [facility] for appointment scheduling. Attention Provider: Thank you for allowing me to participate in the care of your patient. The patient was connected to triage in response to information provided to the ECC. Please do not respond through this encounter as the response is not directed to a shared pool.

## 2021-03-02 ENCOUNTER — OFFICE VISIT (OUTPATIENT)
Dept: PRIMARY CARE CLINIC | Age: 51
End: 2021-03-02
Payer: COMMERCIAL

## 2021-03-02 VITALS
OXYGEN SATURATION: 98 % | BODY MASS INDEX: 29.54 KG/M2 | HEART RATE: 80 BPM | WEIGHT: 188.2 LBS | DIASTOLIC BLOOD PRESSURE: 79 MMHG | TEMPERATURE: 98.4 F | HEIGHT: 67 IN | SYSTOLIC BLOOD PRESSURE: 134 MMHG

## 2021-03-02 DIAGNOSIS — R11.0 NAUSEA: ICD-10-CM

## 2021-03-02 DIAGNOSIS — R00.2 PALPITATION: Primary | ICD-10-CM

## 2021-03-02 DIAGNOSIS — R00.0 RAPID HEARTBEAT: ICD-10-CM

## 2021-03-02 DIAGNOSIS — Z11.59 ENCOUNTER FOR HEPATITIS C SCREENING TEST FOR LOW RISK PATIENT: ICD-10-CM

## 2021-03-02 DIAGNOSIS — Z11.4 SCREENING FOR HIV (HUMAN IMMUNODEFICIENCY VIRUS): ICD-10-CM

## 2021-03-02 PROCEDURE — 99214 OFFICE O/P EST MOD 30 MIN: CPT | Performed by: STUDENT IN AN ORGANIZED HEALTH CARE EDUCATION/TRAINING PROGRAM

## 2021-03-02 PROCEDURE — 93000 ELECTROCARDIOGRAM COMPLETE: CPT | Performed by: STUDENT IN AN ORGANIZED HEALTH CARE EDUCATION/TRAINING PROGRAM

## 2021-03-02 PROCEDURE — G8419 CALC BMI OUT NRM PARAM NOF/U: HCPCS | Performed by: STUDENT IN AN ORGANIZED HEALTH CARE EDUCATION/TRAINING PROGRAM

## 2021-03-02 PROCEDURE — 1036F TOBACCO NON-USER: CPT | Performed by: STUDENT IN AN ORGANIZED HEALTH CARE EDUCATION/TRAINING PROGRAM

## 2021-03-02 PROCEDURE — G8427 DOCREV CUR MEDS BY ELIG CLIN: HCPCS | Performed by: STUDENT IN AN ORGANIZED HEALTH CARE EDUCATION/TRAINING PROGRAM

## 2021-03-02 PROCEDURE — G8484 FLU IMMUNIZE NO ADMIN: HCPCS | Performed by: STUDENT IN AN ORGANIZED HEALTH CARE EDUCATION/TRAINING PROGRAM

## 2021-03-02 RX ORDER — ONDANSETRON 4 MG/1
4 TABLET, ORALLY DISINTEGRATING ORAL EVERY 8 HOURS PRN
Qty: 20 TABLET | Refills: 0 | Status: SHIPPED | OUTPATIENT
Start: 2021-03-02

## 2021-03-02 ASSESSMENT — ENCOUNTER SYMPTOMS
COUGH: 0
SHORTNESS OF BREATH: 0
CHEST TIGHTNESS: 0

## 2021-03-02 NOTE — PROGRESS NOTES
however, given her age he cannot exclude other arrhythmias such as A. fib. Will update TSH and echo as it has not been checked in 5 years. We will also update cardiac monitor and check electrolytes. Will refer to cardiology for evaluation.  - TSH with Reflex; Future  - ECHO 2D WO Color Doppler Complete; Future  - Cardiac event monitor; Future  - Mario Huntley MD, Cardiology, [facility]  - COMPREHENSIVE METABOLIC PANEL; Future  - MAGNESIUM; Future    Encounter for hepatitis C screening test for low risk patient  - Hepatitis C Antibody; Future    4. Screening for HIV (human immunodeficiency virus)  - HIV Screen; Future    Return in about 6 weeks (around 4/13/2021). An electronic signature was used to authenticate this note.     --Mica Baez,  on 3/2/2021 at 4:46 PM

## 2021-03-19 DIAGNOSIS — D50.0 IRON DEFICIENCY ANEMIA DUE TO CHRONIC BLOOD LOSS: ICD-10-CM

## 2021-03-22 RX ORDER — FERROUS GLUCONATE 324(37.5)
TABLET ORAL
Qty: 60 TABLET | Refills: 0 | Status: SHIPPED | OUTPATIENT
Start: 2021-03-22 | End: 2021-04-30

## 2021-04-30 DIAGNOSIS — D50.0 IRON DEFICIENCY ANEMIA DUE TO CHRONIC BLOOD LOSS: ICD-10-CM

## 2021-04-30 RX ORDER — FERROUS GLUCONATE 324(37.5)
TABLET ORAL
Qty: 60 TABLET | Refills: 0 | Status: SHIPPED | OUTPATIENT
Start: 2021-04-30 | End: 2021-06-15

## 2021-04-30 NOTE — TELEPHONE ENCOUNTER
Last appt: 3/2/2021    Next appt: Visit date not found        Due to return: 04/13/2021    No appointment made yet          ferrous gluconate 324 (37.5 Fe) MG TABS     Take one tablet by mouth twice per day for anemia          [facility] [address] [facility] Maria Eugenia Cortes [phone]   [address] [place] [address], [address] [place] [address]   Phone:  [phone]  Fax:  [phone]

## 2021-06-14 DIAGNOSIS — D50.0 IRON DEFICIENCY ANEMIA DUE TO CHRONIC BLOOD LOSS: ICD-10-CM

## 2021-06-14 NOTE — TELEPHONE ENCOUNTER
Last appt: 3/2/2021  Next appt: Visit date not found        Due to return: 04/13/2021    No appointment made as of today

## 2021-06-15 RX ORDER — FERROUS GLUCONATE 324(37.5)
TABLET ORAL
Qty: 60 TABLET | Refills: 0 | Status: SHIPPED | OUTPATIENT
Start: 2021-06-15 | End: 2021-07-07 | Stop reason: SDUPTHER

## 2021-07-06 ENCOUNTER — TELEPHONE (OUTPATIENT)
Dept: PRIMARY CARE CLINIC | Age: 51
End: 2021-07-06

## 2021-07-06 NOTE — TELEPHONE ENCOUNTER
See if she can call around to a pharmacy that has her ferrous gluconate \"green pill\" 324 mg tab. Then I can send it there. Thank you!   Dr. Digna Newton

## 2021-07-06 NOTE — TELEPHONE ENCOUNTER
Pt called said the pharmacy was supposed to call us and she would like to know if we have talked to them please call pt and let her know

## 2021-07-07 DIAGNOSIS — D50.0 IRON DEFICIENCY ANEMIA DUE TO CHRONIC BLOOD LOSS: ICD-10-CM

## 2021-07-07 RX ORDER — FERROUS GLUCONATE 324(37.5)
TABLET ORAL
Qty: 60 TABLET | Refills: 0 | Status: SHIPPED | OUTPATIENT
Start: 2021-07-07 | End: 2021-08-02

## 2021-07-07 NOTE — TELEPHONE ENCOUNTER
Called pt was unable to reach, left Vmail advised no this office has not received any info from pharm and she should call us with the pharm of her choice

## 2021-07-31 DIAGNOSIS — D50.0 IRON DEFICIENCY ANEMIA DUE TO CHRONIC BLOOD LOSS: ICD-10-CM

## 2021-08-02 RX ORDER — DOXYCYCLINE HYCLATE 50 MG/1
CAPSULE, GELATIN COATED ORAL
Qty: 60 TABLET | Refills: 3 | Status: SHIPPED | OUTPATIENT
Start: 2021-08-02 | End: 2021-11-15

## 2021-08-02 NOTE — TELEPHONE ENCOUNTER
Medication:   Requested Prescriptions     Pending Prescriptions Disp Refills    ferrous gluconate (FERGON) 324 (38 Fe) MG tablet [Pharmacy Med Name: FERROUS GLUCONATE 324 MG TAB] 60 tablet      Sig: TAKE 1 TABLET BY MOUTH TWO TIMES A DAY FOR ANEMIA        Last Filled: 7/7/2021      Patient Phone Number: [phone] (home)     Last appt: 3/2/2021 Return in about 6 weeks (around 4/13/2021). Next appt: Visit date not found    Last OARRS: No flowsheet data found.

## 2021-11-14 DIAGNOSIS — F33.1 MODERATE EPISODE OF RECURRENT MAJOR DEPRESSIVE DISORDER (HCC): ICD-10-CM

## 2021-11-14 DIAGNOSIS — F41.9 ANXIETY: ICD-10-CM

## 2021-11-14 DIAGNOSIS — D50.0 IRON DEFICIENCY ANEMIA DUE TO CHRONIC BLOOD LOSS: ICD-10-CM

## 2021-11-15 RX ORDER — DOXYCYCLINE HYCLATE 50 MG/1
CAPSULE, GELATIN COATED ORAL
Qty: 60 TABLET | Refills: 3 | Status: SHIPPED | OUTPATIENT
Start: 2021-11-15 | End: 2022-03-10

## 2021-11-15 RX ORDER — VENLAFAXINE HYDROCHLORIDE 75 MG/1
CAPSULE, EXTENDED RELEASE ORAL
Qty: 90 CAPSULE | Refills: 0 | Status: SHIPPED | OUTPATIENT
Start: 2021-11-15 | End: 2022-02-10

## 2021-11-15 NOTE — TELEPHONE ENCOUNTER
Medication:   Requested Prescriptions     Pending Prescriptions Disp Refills    venlafaxine (EFFEXOR XR) 75 MG extended release capsule [Pharmacy Med Name: Venlafaxine HCl ER Oral Capsule Extended Release 24 Hour 75 MG] 90 capsule 0     Sig: TAKE 1 CAPSULE BY MOUTH EVERY DAY        Last Filled:      Patient Phone Number: [phone] (home)     Last appt: 3/2/2021   Next appt: Visit date not found   Return in about 6 weeks (around 4/13/2021). Last OARRS: No flowsheet data found.

## 2021-11-15 NOTE — TELEPHONE ENCOUNTER
Medication:   Requested Prescriptions     Pending Prescriptions Disp Refills    ferrous gluconate (FERGON) 324 (38 Fe) MG tablet [Pharmacy Med Name: FERROUS GLUCONATE 324 MG TAB] 60 tablet 3     Sig: TAKE 1 TABLET BY MOUTH TWO TIMES A DAY FOR ANEMIA        Last Filled:      Patient Phone Number: [phone] (home)     Last appt: 3/2/2021   Next appt: 11/14/2021    Last OARRS: No flowsheet data found.

## 2022-02-09 DIAGNOSIS — F41.9 ANXIETY: ICD-10-CM

## 2022-02-09 DIAGNOSIS — F33.1 MODERATE EPISODE OF RECURRENT MAJOR DEPRESSIVE DISORDER (HCC): ICD-10-CM

## 2022-02-10 RX ORDER — VENLAFAXINE HYDROCHLORIDE 75 MG/1
CAPSULE, EXTENDED RELEASE ORAL
Qty: 90 CAPSULE | Refills: 0 | Status: SHIPPED | OUTPATIENT
Start: 2022-02-10 | End: 2022-05-10

## 2022-03-10 DIAGNOSIS — D50.0 IRON DEFICIENCY ANEMIA DUE TO CHRONIC BLOOD LOSS: ICD-10-CM

## 2022-03-10 RX ORDER — DOXYCYCLINE HYCLATE 50 MG/1
CAPSULE, GELATIN COATED ORAL
Qty: 60 TABLET | Refills: 3 | Status: SHIPPED | OUTPATIENT
Start: 2022-03-10

## 2022-03-10 NOTE — TELEPHONE ENCOUNTER
Called pt to schedule an appointment, pt stated she will call back later today to schedule the appointment.

## 2022-03-10 NOTE — TELEPHONE ENCOUNTER
Medication:   Requested Prescriptions     Pending Prescriptions Disp Refills    ferrous gluconate (FERGON) 324 (38 Fe) MG tablet [Pharmacy Med Name: FERROUS GLUCONATE 324 MG TAB] 60 tablet 3     Sig: TAKE 1 TABLET BY MOUTH TWO TIMES A DAY FOR ANEMIA        Last Filled:      Patient Phone Number: [phone] (home)     Last appt: 3/2/2021   Next appt: Visit date not found    Last OARRS: No flowsheet data found.

## 2022-05-10 DIAGNOSIS — F33.1 MODERATE EPISODE OF RECURRENT MAJOR DEPRESSIVE DISORDER (HCC): ICD-10-CM

## 2022-05-10 DIAGNOSIS — F41.9 ANXIETY: ICD-10-CM

## 2022-05-10 RX ORDER — VENLAFAXINE HYDROCHLORIDE 75 MG/1
CAPSULE, EXTENDED RELEASE ORAL
Qty: 90 CAPSULE | Refills: 0 | Status: SHIPPED | OUTPATIENT
Start: 2022-05-10 | End: 2022-07-19

## 2022-05-11 DIAGNOSIS — F33.1 MODERATE EPISODE OF RECURRENT MAJOR DEPRESSIVE DISORDER (HCC): ICD-10-CM

## 2022-05-11 DIAGNOSIS — F41.9 ANXIETY: ICD-10-CM

## 2022-05-11 RX ORDER — VENLAFAXINE HYDROCHLORIDE 75 MG/1
CAPSULE, EXTENDED RELEASE ORAL
Qty: 90 CAPSULE | Refills: 0 | OUTPATIENT
Start: 2022-05-11

## 2022-07-19 DIAGNOSIS — F41.9 ANXIETY: ICD-10-CM

## 2022-07-19 DIAGNOSIS — F33.1 MODERATE EPISODE OF RECURRENT MAJOR DEPRESSIVE DISORDER (HCC): ICD-10-CM

## 2022-07-19 RX ORDER — VENLAFAXINE HYDROCHLORIDE 75 MG/1
CAPSULE, EXTENDED RELEASE ORAL
Qty: 90 CAPSULE | Refills: 0 | Status: SHIPPED | OUTPATIENT
Start: 2022-07-19 | End: 2022-08-15

## 2022-07-19 NOTE — TELEPHONE ENCOUNTER
Medication:   Requested Prescriptions     Pending Prescriptions Disp Refills    venlafaxine (EFFEXOR XR) 75 MG extended release capsule [Pharmacy Med Name: Venlafaxine HCl ER Oral Capsule Extended Release 24 Hour 75 MG] 90 capsule 0     Sig: TAKE 1 CAPSULE BY MOUTH EVERY DAY        Last Filled:  5/10/22    Patient Phone Number: [phone] (home)     Last appt: 3/2/2021   Next appt: Visit date not found    Last OARRS: No flowsheet data found.

## 2022-08-13 DIAGNOSIS — F33.1 MODERATE EPISODE OF RECURRENT MAJOR DEPRESSIVE DISORDER (HCC): ICD-10-CM

## 2022-08-13 DIAGNOSIS — F41.9 ANXIETY: ICD-10-CM

## 2022-08-15 RX ORDER — VENLAFAXINE HYDROCHLORIDE 75 MG/1
CAPSULE, EXTENDED RELEASE ORAL
Qty: 90 CAPSULE | Refills: 0 | Status: SHIPPED | OUTPATIENT
Start: 2022-08-15

## 2022-11-17 ENCOUNTER — OFFICE VISIT (OUTPATIENT)
Dept: PRIMARY CARE CLINIC | Age: 52
End: 2022-11-17
Payer: COMMERCIAL

## 2022-11-17 VITALS
DIASTOLIC BLOOD PRESSURE: 84 MMHG | WEIGHT: 218.6 LBS | SYSTOLIC BLOOD PRESSURE: 133 MMHG | BODY MASS INDEX: 34.31 KG/M2 | HEIGHT: 67 IN | HEART RATE: 93 BPM

## 2022-11-17 DIAGNOSIS — E66.9 OBESITY (BMI 30-39.9): Primary | ICD-10-CM

## 2022-11-17 PROBLEM — R87.610 ATYPICAL SQUAMOUS CELL CHANGES OF UNDETERMINED SIGNIFICANCE (ASCUS) ON CERVICAL CYTOLOGY WITH POSITIVE HIGH RISK HUMAN PAPILLOMA VIRUS (HPV): Status: ACTIVE | Noted: 2021-11-23

## 2022-11-17 PROBLEM — R87.810 ATYPICAL SQUAMOUS CELL CHANGES OF UNDETERMINED SIGNIFICANCE (ASCUS) ON CERVICAL CYTOLOGY WITH POSITIVE HIGH RISK HUMAN PAPILLOMA VIRUS (HPV): Status: ACTIVE | Noted: 2021-11-23

## 2022-11-17 PROCEDURE — 99214 OFFICE O/P EST MOD 30 MIN: CPT | Performed by: FAMILY MEDICINE

## 2022-11-17 PROCEDURE — 1036F TOBACCO NON-USER: CPT | Performed by: FAMILY MEDICINE

## 2022-11-17 PROCEDURE — G8484 FLU IMMUNIZE NO ADMIN: HCPCS | Performed by: FAMILY MEDICINE

## 2022-11-17 PROCEDURE — 3017F COLORECTAL CA SCREEN DOC REV: CPT | Performed by: FAMILY MEDICINE

## 2022-11-17 PROCEDURE — G8427 DOCREV CUR MEDS BY ELIG CLIN: HCPCS | Performed by: FAMILY MEDICINE

## 2022-11-17 PROCEDURE — G8417 CALC BMI ABV UP PARAM F/U: HCPCS | Performed by: FAMILY MEDICINE

## 2022-11-17 ASSESSMENT — ENCOUNTER SYMPTOMS
COUGH: 0
ABDOMINAL PAIN: 0
DIARRHEA: 0
CONSTIPATION: 0
EYE PAIN: 0
SHORTNESS OF BREATH: 0

## 2022-11-17 NOTE — PROGRESS NOTES
Chief Complaint   Patient presents with    Weight Management         ASSESSMENT/PLAN:  1. Obesity (BMI 30-39. 9)  Uncontrolled  Discussed modalities of treatment and dietary modifications as well as lifestyle changes to be implemented  Has a new plan for modifying dietary habits as below  Discussed Adipex versus injectables and she will think about it  After she loses 3 to 5 pounds in 4 weeks, we will start a medication with a goal of getting her BMI to at least 28. HPI:  Po Client is a 47 y.o. (: 1970) here today for weight mgmt. She states she is gaining too much weight and feels as though she is lost on her to get it off. Has not increased activity nor change her eating habits yet. Wt Readings from Last 3 Encounters:   22 218 lb 9.6 oz (99.2 kg)   21 188 lb 3.2 oz (85.4 kg)   10/08/20 180 lb 12.8 oz (82 kg)     Discussed current habits and made a new plan for modifying diet. She is not a snacker. Usually drinks water. New plan:  Breakfast: apple and hard-boiled egg  Lunches: salads loaded with veggies and a protein  Dinner: will plan on healthy plate model  Will try not to eat past 8 PM    Review of Systems   Constitutional:  Negative for appetite change, chills, fatigue and fever. HENT:  Negative for congestion. Eyes:  Negative for pain and visual disturbance. Respiratory:  Negative for cough and shortness of breath. Cardiovascular:  Negative for chest pain and palpitations. Gastrointestinal:  Negative for abdominal pain, constipation and diarrhea. Genitourinary:  Negative for difficulty urinating. Musculoskeletal:  Negative for arthralgias. Skin:  Negative for rash and wound. Neurological:  Negative for dizziness, weakness, light-headedness and headaches. Hematological:  Does not bruise/bleed easily. Psychiatric/Behavioral:  Negative for behavioral problems.       Past Medical History:   Diagnosis Date    Anxiety     Gastroenteritis     SVT (supraventricular tachycardia) ([facility] [address]        Family History   Problem Relation Age of Onset    Heart Disease Mother        Social History     Tobacco Use    Smoking status: Never    Smokeless tobacco: Never   Substance Use Topics    Alcohol use: Not Currently    Drug use: No       New Prescriptions    No medications on file       Meds Prior to visit:  Current Outpatient Medications on File Prior to Visit   Medication Sig Dispense Refill    venlafaxine (EFFEXOR XR) 75 MG extended release capsule TAKE 1 CAPSULE BY MOUTH EVERY DAY 90 capsule 0    ferrous gluconate (FERGON) 324 (38 Fe) MG tablet TAKE 1 TABLET BY MOUTH TWO TIMES A DAY FOR ANEMIA 60 tablet 3     No current facility-administered medications on file prior to visit. No Known Allergies    OBJECTIVE:  /84   Pulse 93   Ht 5' 7\" (1.702 m)   Wt 218 lb 9.6 oz (99.2 kg)   BMI 34.24 kg/m²   BP Readings from Last 2 Encounters:   11/17/22 133/84   03/02/21 134/79     Wt Readings from Last 3 Encounters:   11/17/22 218 lb 9.6 oz (99.2 kg)   03/02/21 188 lb 3.2 oz (85.4 kg)   10/08/20 180 lb 12.8 oz (82 kg)       Physical Exam  Vitals reviewed. Constitutional:       General: She is not in acute distress. Appearance: Normal appearance. She is not ill-appearing. HENT:      Head: Normocephalic and atraumatic. Right Ear: External ear normal.      Left Ear: External ear normal.      Nose: Nose normal. No congestion. Mouth/Throat:      Mouth: Mucous membranes are moist.      Pharynx: Oropharynx is clear. No oropharyngeal exudate. Eyes:      Extraocular Movements: Extraocular movements intact. Conjunctiva/sclera: Conjunctivae normal.      Pupils: Pupils are equal, round, and reactive to light. Cardiovascular:      Rate and Rhythm: Normal rate and regular rhythm. Pulses: Normal pulses. Heart sounds: Normal heart sounds. Pulmonary:      Effort: Pulmonary effort is normal. No respiratory distress.       Breath sounds: Normal breath sounds. No stridor. No wheezing, rhonchi or rales. Abdominal:      General: Bowel sounds are normal.      Palpations: Abdomen is soft. Tenderness: There is no abdominal tenderness. Musculoskeletal:         General: Normal range of motion. Cervical back: Normal range of motion and neck supple. Right lower leg: No edema. Left lower leg: No edema. Skin:     General: Skin is warm. Capillary Refill: Capillary refill takes less than 2 seconds. Findings: No erythema or rash. Neurological:      General: No focal deficit present. Mental Status: She is alert and oriented to person, place, and time. Mental status is at baseline. Motor: No weakness. Coordination: Coordination normal.      Gait: Gait normal.   Psychiatric:         Mood and Affect: Mood normal.         Behavior: Behavior normal.         Thought Content: Thought content normal.         Judgment: Judgment normal.       Lab Results   Component Value Date    WBC 4.8 07/01/2020    HGB 10.0 (L) 07/01/2020    HCT 36.0 07/01/2020    MCV 68.0 (L) 07/01/2020     07/01/2020     Lab Results   Component Value Date     05/21/2014    K 3.6 05/21/2014     05/21/2014    CO2 20 (L) 05/21/2014    BUN 23 (H) 05/21/2014    CREATININE 1.2 (H) 05/21/2014    GLUCOSE 120 (H) 05/21/2014    CALCIUM 9.6 05/21/2014    LABGLOM 52 (A) 05/21/2014    GFRAA >60 05/21/2014     No results found for: CHOL  No results found for: TRIG  No results found for: HDL  No results found for: LDLCHOLESTEROL, LDLCALC  No results found for: LABVLDL, VLDL  No results found for: LABA1C      Discussed use, benefit, and side effects of prescribed medications. Barriers to medication compliance addressed. All patient questions answered. Pt voiced understanding. RTC Return in about 4 weeks (around 12/15/2022).     Future Appointments   Date Time Provider Sander Medellin   12/27/2022 11:15 AM [facility] ROOM 2 [facility] Joaquín Marroquin MD  11/17/2022  9:47 AM

## 2022-11-23 DIAGNOSIS — D50.0 IRON DEFICIENCY ANEMIA DUE TO CHRONIC BLOOD LOSS: ICD-10-CM

## 2022-11-23 RX ORDER — DOXYCYCLINE HYCLATE 50 MG/1
CAPSULE, GELATIN COATED ORAL
Qty: 180 TABLET | Refills: 1 | Status: SHIPPED | OUTPATIENT
Start: 2022-11-23

## 2022-11-23 NOTE — TELEPHONE ENCOUNTER
Medication:   Requested Prescriptions     Pending Prescriptions Disp Refills    ferrous gluconate (FERGON) 324 (38 Fe) MG tablet [Pharmacy Med Name: FERROUS GLUCONATE 324 MG TAB] 180 tablet 1     Sig: TAKE 1 TABLET BY MOUTH TWO TIMES A DAY FOR ANEMIA        Last Filled:  03/10/22    Patient Phone Number: [phone] (home)     Last appt: 11/17/2022   Next appt: Visit date not found    Last OARRS: No flowsheet data found.

## 2022-12-19 DIAGNOSIS — D50.0 IRON DEFICIENCY ANEMIA DUE TO CHRONIC BLOOD LOSS: ICD-10-CM

## 2022-12-20 ENCOUNTER — OFFICE VISIT (OUTPATIENT)
Dept: PRIMARY CARE CLINIC | Age: 52
End: 2022-12-20
Payer: COMMERCIAL

## 2022-12-20 VITALS
OXYGEN SATURATION: 98 % | TEMPERATURE: 97.2 F | SYSTOLIC BLOOD PRESSURE: 133 MMHG | BODY MASS INDEX: 33.59 KG/M2 | WEIGHT: 214 LBS | HEART RATE: 81 BPM | HEIGHT: 67 IN | DIASTOLIC BLOOD PRESSURE: 84 MMHG

## 2022-12-20 DIAGNOSIS — E66.9 OBESITY (BMI 30-39.9): ICD-10-CM

## 2022-12-20 DIAGNOSIS — D50.0 IRON DEFICIENCY ANEMIA DUE TO CHRONIC BLOOD LOSS: ICD-10-CM

## 2022-12-20 DIAGNOSIS — E66.9 OBESITY (BMI 30-39.9): Primary | ICD-10-CM

## 2022-12-20 PROBLEM — K59.03 DRUG-INDUCED CONSTIPATION: Status: RESOLVED | Noted: 2020-06-19 | Resolved: 2022-12-20

## 2022-12-20 PROBLEM — G47.01 INSOMNIA DUE TO MEDICAL CONDITION: Status: RESOLVED | Noted: 2020-06-19 | Resolved: 2022-12-20

## 2022-12-20 PROBLEM — R53.1 ASTHENIA: Status: RESOLVED | Noted: 2020-06-19 | Resolved: 2022-12-20

## 2022-12-20 LAB
A/G RATIO: 1.6 (ref 1.1–2.2)
ALBUMIN SERPL-MCNC: 4.5 G/DL (ref 3.4–5)
ALP BLD-CCNC: 130 U/L (ref 40–129)
ALT SERPL-CCNC: 18 U/L (ref 10–40)
ANION GAP SERPL CALCULATED.3IONS-SCNC: 12 MMOL/L (ref 3–16)
AST SERPL-CCNC: 19 U/L (ref 15–37)
BASOPHILS ABSOLUTE: 0.1 K/UL (ref 0–0.2)
BASOPHILS RELATIVE PERCENT: 1 %
BILIRUB SERPL-MCNC: 0.8 MG/DL (ref 0–1)
BUN BLDV-MCNC: 8 MG/DL (ref 7–20)
CALCIUM SERPL-MCNC: 9.9 MG/DL (ref 8.3–10.6)
CHLORIDE BLD-SCNC: 98 MMOL/L (ref 99–110)
CHOLESTEROL, TOTAL: 174 MG/DL (ref 0–199)
CO2: 26 MMOL/L (ref 21–32)
CREAT SERPL-MCNC: 0.6 MG/DL (ref 0.6–1.1)
EOSINOPHILS ABSOLUTE: 0.2 K/UL (ref 0–0.6)
EOSINOPHILS RELATIVE PERCENT: 3.5 %
FERRITIN: 63.7 NG/ML (ref 15–150)
GFR SERPL CREATININE-BSD FRML MDRD: >60 ML/MIN/{1.73_M2}
GLUCOSE BLD-MCNC: 91 MG/DL (ref 70–99)
HCT VFR BLD CALC: 50.1 % (ref 36–48)
HDLC SERPL-MCNC: 41 MG/DL (ref 40–60)
HEMOGLOBIN: 16 G/DL (ref 12–16)
IRON SATURATION: 31 % (ref 15–50)
IRON: 94 UG/DL (ref 37–145)
LDL CHOLESTEROL CALCULATED: 110 MG/DL
LYMPHOCYTES ABSOLUTE: 1 K/UL (ref 1–5.1)
LYMPHOCYTES RELATIVE PERCENT: 15.3 %
MCH RBC QN AUTO: 27.6 PG (ref 26–34)
MCHC RBC AUTO-ENTMCNC: 31.9 G/DL (ref 31–36)
MCV RBC AUTO: 86.4 FL (ref 80–100)
MONOCYTES ABSOLUTE: 0.7 K/UL (ref 0–1.3)
MONOCYTES RELATIVE PERCENT: 10.3 %
NEUTROPHILS ABSOLUTE: 4.5 K/UL (ref 1.7–7.7)
NEUTROPHILS RELATIVE PERCENT: 69.9 %
PDW BLD-RTO: 15.3 % (ref 12.4–15.4)
PLATELET # BLD: 196 K/UL (ref 135–450)
PMV BLD AUTO: 10.3 FL (ref 5–10.5)
POTASSIUM SERPL-SCNC: 4.6 MMOL/L (ref 3.5–5.1)
RBC # BLD: 5.8 M/UL (ref 4–5.2)
SODIUM BLD-SCNC: 136 MMOL/L (ref 136–145)
TOTAL IRON BINDING CAPACITY: 300 UG/DL (ref 260–445)
TOTAL PROTEIN: 7.3 G/DL (ref 6.4–8.2)
TRANSFERRIN: 250 MG/DL (ref 200–360)
TRIGL SERPL-MCNC: 117 MG/DL (ref 0–150)
VLDLC SERPL CALC-MCNC: 23 MG/DL
WBC # BLD: 6.4 K/UL (ref 4–11)

## 2022-12-20 PROCEDURE — G8417 CALC BMI ABV UP PARAM F/U: HCPCS | Performed by: FAMILY MEDICINE

## 2022-12-20 PROCEDURE — 1036F TOBACCO NON-USER: CPT | Performed by: FAMILY MEDICINE

## 2022-12-20 PROCEDURE — 99214 OFFICE O/P EST MOD 30 MIN: CPT | Performed by: FAMILY MEDICINE

## 2022-12-20 PROCEDURE — G8427 DOCREV CUR MEDS BY ELIG CLIN: HCPCS | Performed by: FAMILY MEDICINE

## 2022-12-20 PROCEDURE — 3017F COLORECTAL CA SCREEN DOC REV: CPT | Performed by: FAMILY MEDICINE

## 2022-12-20 PROCEDURE — G8484 FLU IMMUNIZE NO ADMIN: HCPCS | Performed by: FAMILY MEDICINE

## 2022-12-20 RX ORDER — DOXYCYCLINE HYCLATE 50 MG/1
CAPSULE, GELATIN COATED ORAL
Qty: 180 TABLET | Refills: 3 | Status: SHIPPED | OUTPATIENT
Start: 2022-12-20

## 2022-12-20 RX ORDER — DOXYCYCLINE HYCLATE 50 MG/1
CAPSULE, GELATIN COATED ORAL
Qty: 180 TABLET | Refills: 1 | Status: SHIPPED | OUTPATIENT
Start: 2022-12-20 | End: 2022-12-20

## 2022-12-20 ASSESSMENT — PATIENT HEALTH QUESTIONNAIRE - PHQ9
1. LITTLE INTEREST OR PLEASURE IN DOING THINGS: 0
SUM OF ALL RESPONSES TO PHQ QUESTIONS 1-9: 0
SUM OF ALL RESPONSES TO PHQ QUESTIONS 1-9: 0
5. POOR APPETITE OR OVEREATING: 0
SUM OF ALL RESPONSES TO PHQ QUESTIONS 1-9: 0
10. IF YOU CHECKED OFF ANY PROBLEMS, HOW DIFFICULT HAVE THESE PROBLEMS MADE IT FOR YOU TO DO YOUR WORK, TAKE CARE OF THINGS AT HOME, OR GET ALONG WITH OTHER PEOPLE: 0
9. THOUGHTS THAT YOU WOULD BE BETTER OFF DEAD, OR OF HURTING YOURSELF: 0
2. FEELING DOWN, DEPRESSED OR HOPELESS: 0
3. TROUBLE FALLING OR STAYING ASLEEP: 0
SUM OF ALL RESPONSES TO PHQ QUESTIONS 1-9: 0
SUM OF ALL RESPONSES TO PHQ9 QUESTIONS 1 & 2: 0
8. MOVING OR SPEAKING SO SLOWLY THAT OTHER PEOPLE COULD HAVE NOTICED. OR THE OPPOSITE, BEING SO FIGETY OR RESTLESS THAT YOU HAVE BEEN MOVING AROUND A LOT MORE THAN USUAL: 0
6. FEELING BAD ABOUT YOURSELF - OR THAT YOU ARE A FAILURE OR HAVE LET YOURSELF OR YOUR FAMILY DOWN: 0
7. TROUBLE CONCENTRATING ON THINGS, SUCH AS READING THE NEWSPAPER OR WATCHING TELEVISION: 0
4. FEELING TIRED OR HAVING LITTLE ENERGY: 0

## 2022-12-20 ASSESSMENT — ENCOUNTER SYMPTOMS
COUGH: 0
CONSTIPATION: 0
ABDOMINAL PAIN: 0
SHORTNESS OF BREATH: 0
DIARRHEA: 0
EYE PAIN: 0

## 2022-12-20 NOTE — TELEPHONE ENCOUNTER
Medication:   Requested Prescriptions     Pending Prescriptions Disp Refills    ferrous gluconate (FERGON) 324 (38 Fe) MG tablet 180 tablet 1       Last Filled:      Patient Phone Number: [phone] (home)     Last appt: 11/17/2022   Next appt: 12/20/2022  Return in about 4 weeks (around 12/15/2022).   Last PSA: No results found for: PSA

## 2022-12-20 NOTE — PROGRESS NOTES
Chief Complaint   Patient presents with    Weight Management         ASSESSMENT/PLAN:  1. Obesity (BMI 30-39. 9)  Complicating all aspects of patients care. Lost 4 pounds with new dietary mods and walking more as below. Rule out diabetes and HLD  Update liver and kidney function  Int he setting of h/o SVT, I do not believe adipex would be a safe choice. Will see if patient has DM and try injectable. Follow up in 4 weeks to gauge improvement   - Tirzepatide (MOUNJARO) 2.5 MG/0.5ML SOPN SC injection; Inject 0.5 mLs into the skin once a week  Dispense: 2 mL; Refill: 0  - Comprehensive Metabolic Panel; Future  - Hemoglobin A1C; Future  - CBC with Auto Differential; Future  - Lipid Panel; Future    2. Iron deficiency anemia due to chronic blood loss  Update iron panel and refill supplement   Follow up results and treat accordingly   - ferrous gluconate (FERGON) 324 (38 Fe) MG tablet; TAKE 1 TABLET BY MOUTH TWO TIMES A DAY FOR ANEMIA  Dispense: 180 tablet; Refill: 1  - CBC with Auto Differential; Future  - Ferritin; Future  - Iron and TIBC; Future  - Transferrin; Future         HPI:  Vanessa Tejada is a 47 y.o. (: 1970) here today for weight mgmt. She states she is gaining too much weight and feels as though she is lost on her to get it off. Has not increased activity nor change her eating habits yet. Wt Readings from Last 3 Encounters:   22 214 lb (97.1 kg)   22 218 lb 9.6 oz (99.2 kg)   21 188 lb 3.2 oz (85.4 kg)     Discussed current habits and made a new plan for modifying diet. She is not a snacker. Usually drinks water. New plan:  Breakfast: toast or apple with a hard-boiled egg  Lunches: salads loaded with veggies and a protein  Dinner: will plan on healthy plate model  Doesn't eat past 8 PM  Walking more. Review of Systems   Constitutional:  Negative for appetite change, chills, fatigue and fever. HENT:  Negative for congestion.     Eyes:  Negative for pain and visual disturbance. Respiratory:  Negative for cough and shortness of breath. Cardiovascular:  Negative for chest pain and palpitations. Gastrointestinal:  Negative for abdominal pain, constipation and diarrhea. Genitourinary:  Negative for difficulty urinating. Musculoskeletal:  Negative for arthralgias. Skin:  Negative for rash and wound. Neurological:  Negative for dizziness, weakness, light-headedness and headaches. Hematological:  Does not bruise/bleed easily. Psychiatric/Behavioral:  Negative for behavioral problems. Past Medical History:   Diagnosis Date    Anxiety     Gastroenteritis     SVT (supraventricular tachycardia) ([facility])        Family History   Problem Relation Age of Onset    Heart Disease Mother        Social History     Tobacco Use    Smoking status: Never    Smokeless tobacco: Never   Substance Use Topics    Alcohol use: Not Currently    Drug use: No       New Prescriptions    TIRZEPATIDE (MOUNJARO) 2.5 MG/0.5ML SOPN SC INJECTION    Inject 0.5 mLs into the skin once a week       Meds Prior to visit:  Current Outpatient Medications on File Prior to Visit   Medication Sig Dispense Refill    venlafaxine (EFFEXOR XR) 75 MG extended release capsule TAKE 1 CAPSULE BY MOUTH EVERY DAY 90 capsule 0     No current facility-administered medications on file prior to visit. No Known Allergies    OBJECTIVE:  /84   Pulse 81   Temp 97.2 °F (36.2 °C)   Ht 5' 7\" (1.702 m)   Wt 214 lb (97.1 kg)   SpO2 98%   BMI 33.52 kg/m²   BP Readings from Last 2 Encounters:   12/20/22 133/84   11/17/22 133/84     Wt Readings from Last 3 Encounters:   12/20/22 214 lb (97.1 kg)   11/17/22 218 lb 9.6 oz (99.2 kg)   03/02/21 188 lb 3.2 oz (85.4 kg)       Physical Exam  Vitals reviewed. Constitutional:       General: She is not in acute distress. Appearance: Normal appearance. She is not ill-appearing. HENT:      Head: Normocephalic and atraumatic.       Right Ear: External ear normal.      Left Ear: External ear normal.      Nose: Nose normal. No congestion. Mouth/Throat:      Mouth: Mucous membranes are moist.      Pharynx: Oropharynx is clear. No oropharyngeal exudate. Eyes:      Extraocular Movements: Extraocular movements intact. Conjunctiva/sclera: Conjunctivae normal.      Pupils: Pupils are equal, round, and reactive to light. Cardiovascular:      Rate and Rhythm: Normal rate and regular rhythm. Pulses: Normal pulses. Heart sounds: Normal heart sounds. Pulmonary:      Effort: Pulmonary effort is normal. No respiratory distress. Breath sounds: Normal breath sounds. No stridor. No wheezing, rhonchi or rales. Abdominal:      General: Bowel sounds are normal.      Palpations: Abdomen is soft. Tenderness: There is no abdominal tenderness. Musculoskeletal:         General: Normal range of motion. Cervical back: Normal range of motion and neck supple. Right lower leg: No edema. Left lower leg: No edema. Skin:     General: Skin is warm. Capillary Refill: Capillary refill takes less than 2 seconds. Findings: No erythema or rash. Neurological:      General: No focal deficit present. Mental Status: She is alert and oriented to person, place, and time. Mental status is at baseline. Motor: No weakness. Coordination: Coordination normal.      Gait: Gait normal.   Psychiatric:         Mood and Affect: Mood normal.         Behavior: Behavior normal.         Thought Content:  Thought content normal.         Judgment: Judgment normal.       Lab Results   Component Value Date    WBC 4.8 07/01/2020    HGB 10.0 (L) 07/01/2020    HCT 36.0 07/01/2020    MCV 68.0 (L) 07/01/2020     07/01/2020     Lab Results   Component Value Date     05/21/2014    K 3.6 05/21/2014     05/21/2014    CO2 20 (L) 05/21/2014    BUN 23 (H) 05/21/2014    CREATININE 1.2 (H) 05/21/2014    GLUCOSE 120 (H) 05/21/2014    CALCIUM 9.6 05/21/2014 LABGLOM 52 (A) 05/21/2014    GFRAA >60 05/21/2014       Discussed use, benefit, and side effects of prescribed medications. Barriers to medication compliance addressed. All patient questions answered. Pt voiced understanding. RTC Return in about 4 weeks (around 1/17/2023).     Future Appointments   Date Time Provider Sander Medellin   12/27/2022 11:15 AM [facility] ROOM 2 [address] [place] [address]       Norah Huston MD  12/20/2022  12:38 PM

## 2022-12-21 LAB
ESTIMATED AVERAGE GLUCOSE: 99.7 MG/DL
HBA1C MFR BLD: 5.1 %

## 2022-12-30 ENCOUNTER — HOSPITAL ENCOUNTER (OUTPATIENT)
Dept: MAMMOGRAPHY | Age: 52
Discharge: HOME | End: 2022-12-30
Payer: COMMERCIAL

## 2022-12-30 VITALS — HEIGHT: 67 IN | BODY MASS INDEX: 33.59 KG/M2 | WEIGHT: 214 LBS

## 2022-12-30 DIAGNOSIS — Z12.31 VISIT FOR SCREENING MAMMOGRAM: ICD-10-CM

## 2022-12-30 PROCEDURE — 77063 BREAST TOMOSYNTHESIS BI: CPT

## 2023-01-17 ENCOUNTER — HOSPITAL ENCOUNTER (OUTPATIENT)
Dept: MAMMOGRAPHY | Age: 53
Discharge: HOME | End: 2023-01-17
Payer: COMMERCIAL

## 2023-01-17 ENCOUNTER — HOSPITAL ENCOUNTER (OUTPATIENT)
Dept: ULTRASOUND IMAGING | Age: 53
Discharge: HOME | End: 2023-01-17
Payer: COMMERCIAL

## 2023-01-17 DIAGNOSIS — R92.8 ABNORMAL MAMMOGRAM: ICD-10-CM

## 2023-01-17 DIAGNOSIS — R92.8 ABNORMAL MAMMOGRAM OF RIGHT BREAST: ICD-10-CM

## 2023-01-17 PROCEDURE — 77065 DX MAMMO INCL CAD UNI: CPT

## 2023-01-17 PROCEDURE — 76642 ULTRASOUND BREAST LIMITED: CPT

## 2023-01-26 DIAGNOSIS — F33.1 MODERATE EPISODE OF RECURRENT MAJOR DEPRESSIVE DISORDER (HCC): ICD-10-CM

## 2023-01-26 DIAGNOSIS — F41.9 ANXIETY: ICD-10-CM

## 2023-01-27 RX ORDER — VENLAFAXINE HYDROCHLORIDE 75 MG/1
CAPSULE, EXTENDED RELEASE ORAL
Qty: 90 CAPSULE | Refills: 0 | Status: SHIPPED | OUTPATIENT
Start: 2023-01-27

## 2023-01-27 NOTE — TELEPHONE ENCOUNTER
Medication:   Requested Prescriptions     Pending Prescriptions Disp Refills    venlafaxine (EFFEXOR XR) 75 MG extended release capsule [Pharmacy Med Name: Venlafaxine HCl ER Oral Capsule Extended Release 24 Hour 75 MG] 90 capsule 0     Sig: TAKE 1 CAPSULE BY MOUTH EVERY DAY        Last Filled:  08/15/22    Patient Phone Number: [phone] (home)     Last appt: 12/20/2022   Next appt: Visit date not found    Last OARRS: No flowsheet data found.

## 2023-02-01 ENCOUNTER — TELEPHONE (OUTPATIENT)
Dept: PRIMARY CARE CLINIC | Age: 53
End: 2023-02-01

## 2023-02-01 NOTE — TELEPHONE ENCOUNTER
----- Message from Sarah English sent at 1/31/2023  3:55 PM EST -----  Subject: Message to Provider    QUESTIONS  Information for Provider? Pt would like to know if she is supposed to make   an appt with her pcp after completing her knee injections. She is done   with the injections and isn't sure if she needs to be seen or not, pls   give the pt a call.  ---------------------------------------------------------------------------  --------------  Clinton GARCIA  [phone]; OK to leave message on voicemail  ---------------------------------------------------------------------------  --------------  SCRIPT ANSWERS  Relationship to Patient?  Self

## 2023-02-13 ASSESSMENT — ENCOUNTER SYMPTOMS
EYE PAIN: 0
DIARRHEA: 0
COUGH: 0
ABDOMINAL PAIN: 0
SHORTNESS OF BREATH: 0
CONSTIPATION: 0

## 2023-02-14 ENCOUNTER — TELEPHONE (OUTPATIENT)
Dept: PRIMARY CARE CLINIC | Age: 53
End: 2023-02-14

## 2023-02-14 ENCOUNTER — OFFICE VISIT (OUTPATIENT)
Dept: PRIMARY CARE CLINIC | Age: 53
End: 2023-02-14
Payer: COMMERCIAL

## 2023-02-14 VITALS
WEIGHT: 211.8 LBS | HEART RATE: 74 BPM | HEIGHT: 67 IN | DIASTOLIC BLOOD PRESSURE: 78 MMHG | TEMPERATURE: 97 F | BODY MASS INDEX: 33.24 KG/M2 | SYSTOLIC BLOOD PRESSURE: 130 MMHG

## 2023-02-14 DIAGNOSIS — E66.9 OBESITY (BMI 30-39.9): Primary | ICD-10-CM

## 2023-02-14 DIAGNOSIS — D17.0 LIPOMA OF SCALP: ICD-10-CM

## 2023-02-14 PROCEDURE — 99214 OFFICE O/P EST MOD 30 MIN: CPT | Performed by: FAMILY MEDICINE

## 2023-02-14 PROCEDURE — 3017F COLORECTAL CA SCREEN DOC REV: CPT | Performed by: FAMILY MEDICINE

## 2023-02-14 PROCEDURE — 1036F TOBACCO NON-USER: CPT | Performed by: FAMILY MEDICINE

## 2023-02-14 PROCEDURE — G8427 DOCREV CUR MEDS BY ELIG CLIN: HCPCS | Performed by: FAMILY MEDICINE

## 2023-02-14 PROCEDURE — G8484 FLU IMMUNIZE NO ADMIN: HCPCS | Performed by: FAMILY MEDICINE

## 2023-02-14 PROCEDURE — G8417 CALC BMI ABV UP PARAM F/U: HCPCS | Performed by: FAMILY MEDICINE

## 2023-02-14 SDOH — ECONOMIC STABILITY: INCOME INSECURITY: HOW HARD IS IT FOR YOU TO PAY FOR THE VERY BASICS LIKE FOOD, HOUSING, MEDICAL CARE, AND HEATING?: NOT HARD AT ALL

## 2023-02-14 SDOH — ECONOMIC STABILITY: FOOD INSECURITY: WITHIN THE PAST 12 MONTHS, YOU WORRIED THAT YOUR FOOD WOULD RUN OUT BEFORE YOU GOT MONEY TO BUY MORE.: NEVER TRUE

## 2023-02-14 SDOH — ECONOMIC STABILITY: HOUSING INSECURITY
IN THE LAST 12 MONTHS, WAS THERE A TIME WHEN YOU DID NOT HAVE A STEADY PLACE TO SLEEP OR SLEPT IN A SHELTER (INCLUDING NOW)?: NO

## 2023-02-14 SDOH — ECONOMIC STABILITY: FOOD INSECURITY: WITHIN THE PAST 12 MONTHS, THE FOOD YOU BOUGHT JUST DIDN'T LAST AND YOU DIDN'T HAVE MONEY TO GET MORE.: NEVER TRUE

## 2023-02-14 ASSESSMENT — PATIENT HEALTH QUESTIONNAIRE - PHQ9
SUM OF ALL RESPONSES TO PHQ9 QUESTIONS 1 & 2: 0
6. FEELING BAD ABOUT YOURSELF - OR THAT YOU ARE A FAILURE OR HAVE LET YOURSELF OR YOUR FAMILY DOWN: 0
SUM OF ALL RESPONSES TO PHQ QUESTIONS 1-9: 0
1. LITTLE INTEREST OR PLEASURE IN DOING THINGS: 0
9. THOUGHTS THAT YOU WOULD BE BETTER OFF DEAD, OR OF HURTING YOURSELF: 0
4. FEELING TIRED OR HAVING LITTLE ENERGY: 0
SUM OF ALL RESPONSES TO PHQ QUESTIONS 1-9: 0
10. IF YOU CHECKED OFF ANY PROBLEMS, HOW DIFFICULT HAVE THESE PROBLEMS MADE IT FOR YOU TO DO YOUR WORK, TAKE CARE OF THINGS AT HOME, OR GET ALONG WITH OTHER PEOPLE: 0
8. MOVING OR SPEAKING SO SLOWLY THAT OTHER PEOPLE COULD HAVE NOTICED. OR THE OPPOSITE, BEING SO FIGETY OR RESTLESS THAT YOU HAVE BEEN MOVING AROUND A LOT MORE THAN USUAL: 0
5. POOR APPETITE OR OVEREATING: 0
7. TROUBLE CONCENTRATING ON THINGS, SUCH AS READING THE NEWSPAPER OR WATCHING TELEVISION: 0
SUM OF ALL RESPONSES TO PHQ QUESTIONS 1-9: 0
2. FEELING DOWN, DEPRESSED OR HOPELESS: 0
3. TROUBLE FALLING OR STAYING ASLEEP: 0
SUM OF ALL RESPONSES TO PHQ QUESTIONS 1-9: 0

## 2023-02-14 NOTE — TELEPHONE ENCOUNTER
Tirzepatide [facility]) 5 MG/0.5ML SOPN SC injection [[id number]]     Order Details  Dose: 5 mg Route: SubCUTAneous Frequency: WEEKLY   Dispense Quantity: 2 mL Refills: 1          Sig: Inject 0.5 mLs into the skin once a week         Start Date: 02/14/23 End Date: --   Written Date: 02/14/23 Expiration Date: 02/14/24   Providers    Authorizing Provider: Tomas Barber MD NPI: [id number]   Ordering User:  Tomas Barber MD          Pharmacy    [facility] [id number] [facility] [address] Erlin Bee [phone] - F [phone]   [address] [place] [id number]   Phone:  [phone]  Fax:  [phone]     Order Class:    Order Class   Normal [1]     Warnings Override History    No Interaction Warnings Shown      Tirzepatide [facility]) 5 MG/0.5ML SOPN SC injection  Date: 2/14/2023 Department:  [facility] Ordering/Authorizing: Tomas Barber MD     Outpatient Medication Detail     Disp Refills Start End    Tirzepatide [facility]) 5 MG/0.5ML SOPN SC injection 2 mL 1 2/14/2023     Sig - Route: Inject 0.5 mLs into the skin once a week - SubCUTAneous    Sent to pharmacy as: Tirzepatide 5 MG/0.5ML Subcutaneous Solution Pen-injector [facility])    E-Prescribing Status: Receipt confirmed by pharmacy (2/14/2023  8:42 AM EST)    Prior authorization: Closed - Prior Authorization not required for patient/medication

## 2023-02-14 NOTE — PROGRESS NOTES
Chief Complaint   Patient presents with    Weight Management         ASSESSMENT/PLAN:  1. Obesity (BMI 30-39. 9)  Complicating all aspects of patients care. Lost 3 pounds with new dietary mods and walking more   She is feeling a little discouraged  Increase mounjaro and follow up in 4 weeks to gauge improvement     2. Lipoma of scalp  Referral placed to Dr. Suman Son for eval and excision. Orders Placed This  Carolyn King MD, Plastic Surgery, [facility]     Referral Priority:   Routine     Referral Type:   Eval and Treat     Referral Reason:   Specialty Services Required     Referred to Provider:   Zaria Ramirez MD     Requested Specialty:   Plastic Surgery     Number of Visits Requested:   1    Tirzepatide [facility]) 5 MG/0.5ML SOPN SC injection     Sig: Inject 0.5 mLs into the skin once a week     Dispense:  2 mL     Refill:  1          HPI:  Krystina Barragan is a 47 y.o. (: 1970) here today for weight mgmt. Wt Readings from Last 3 Encounters:   23 211 lb 12.8 oz (96.1 kg)   22 214 lb (97.1 kg)   22 214 lb (97.1 kg)     Discussed current habits and made a new plan for modifying diet. She is not a snacker. Usually drinks water. Diet habits:  Breakfast: toast or apple with a hard-boiled egg  Lunches: salads loaded with veggies and a protein  Dinner: will plan on healthy plate model  Doesn't eat past 8 PM  Walking more. She is wondering if she can get the lump under her scalp removed. It is causing discomfort at night when she lay on that side and it is getting caught on comb. Review of Systems   Constitutional:  Negative for appetite change, chills, fatigue and fever. HENT:  Negative for congestion. Eyes:  Negative for pain and visual disturbance. Respiratory:  Negative for cough and shortness of breath. Cardiovascular:  Negative for chest pain and palpitations. Gastrointestinal:  Negative for abdominal pain, constipation and diarrhea. Genitourinary:  Negative for difficulty urinating. Musculoskeletal:  Negative for arthralgias. Skin:  Negative for rash and wound. Neurological:  Negative for dizziness, weakness, light-headedness and headaches. Hematological:  Does not bruise/bleed easily. Psychiatric/Behavioral:  Negative for behavioral problems. Past Medical History:   Diagnosis Date    Anxiety     Gastroenteritis     SVT (supraventricular tachycardia) (HCC)        Family History   Problem Relation Age of Onset    Heart Disease Mother     Breast Cancer Sister     Breast Cancer Maternal Aunt        Social History     Tobacco Use    Smoking status: Never    Smokeless tobacco: Never   Substance Use Topics    Alcohol use: Not Currently    Drug use: No       New Prescriptions    TIRZEPATIDE (MOUNJARO) 5 MG/0.5ML SOPN SC INJECTION    Inject 0.5 mLs into the skin once a week       Meds Prior to visit:  Current Outpatient Medications on File Prior to Visit   Medication Sig Dispense Refill    venlafaxine (EFFEXOR XR) 75 MG extended release capsule TAKE 1 CAPSULE BY MOUTH EVERY DAY 90 capsule 0    ferrous gluconate (FERGON) 324 (38 Fe) MG tablet TAKE 1 TABLET BY MOUTH TWO TIMES A DAY FOR ANEMIA 180 tablet 3     No current facility-administered medications on file prior to visit. No Known Allergies    OBJECTIVE:  /78   Pulse 74   Temp 97 °F (36.1 °C) (Temporal)   Ht 5' 7\" (1.702 m)   Wt 211 lb 12.8 oz (96.1 kg)   LMP 09/25/2020   BMI 33.17 kg/m²   BP Readings from Last 2 Encounters:   02/14/23 130/78   12/20/22 133/84     Wt Readings from Last 3 Encounters:   02/14/23 211 lb 12.8 oz (96.1 kg)   12/30/22 214 lb (97.1 kg)   12/20/22 214 lb (97.1 kg)       Physical Exam  Vitals reviewed. Constitutional:       General: She is not in acute distress. Appearance: Normal appearance. She is not ill-appearing. HENT:      Head: Normocephalic and atraumatic.         Right Ear: External ear normal.      Left Ear: External ear normal.      Nose: Nose normal. No congestion. Mouth/Throat:      Mouth: Mucous membranes are moist.      Pharynx: Oropharynx is clear. No oropharyngeal exudate. Eyes:      Extraocular Movements: Extraocular movements intact. Conjunctiva/sclera: Conjunctivae normal.      Pupils: Pupils are equal, round, and reactive to light. Cardiovascular:      Rate and Rhythm: Normal rate and regular rhythm. Pulses: Normal pulses. Heart sounds: Normal heart sounds. Pulmonary:      Effort: Pulmonary effort is normal. No respiratory distress. Breath sounds: Normal breath sounds. No stridor. No wheezing, rhonchi or rales. Abdominal:      General: Bowel sounds are normal.      Palpations: Abdomen is soft. Tenderness: There is no abdominal tenderness. Musculoskeletal:         General: Normal range of motion. Cervical back: Normal range of motion and neck supple. Right lower leg: No edema. Left lower leg: No edema. Skin:     General: Skin is warm. Capillary Refill: Capillary refill takes less than 2 seconds. Findings: No erythema or rash. Neurological:      General: No focal deficit present. Mental Status: She is alert and oriented to person, place, and time. Mental status is at baseline. Motor: No weakness. Coordination: Coordination normal.      Gait: Gait normal.   Psychiatric:         Mood and Affect: Mood normal.         Behavior: Behavior normal.         Thought Content:  Thought content normal.         Judgment: Judgment normal.       Lab Results   Component Value Date    WBC 6.4 12/20/2022    HGB 16.0 12/20/2022    HCT 50.1 (H) 12/20/2022    MCV 86.4 12/20/2022     12/20/2022     Lab Results   Component Value Date     12/20/2022    K 4.6 12/20/2022    CL 98 (L) 12/20/2022    CO2 26 12/20/2022    BUN 8 12/20/2022    CREATININE 0.6 12/20/2022    GLUCOSE 91 12/20/2022    CALCIUM 9.9 12/20/2022    PROT 7.3 12/20/2022    LABALBU 4.5 12/20/2022    BILITOT 0.8 12/20/2022    ALKPHOS 130 (H) 12/20/2022    AST 19 12/20/2022    ALT 18 12/20/2022    LABGLOM >60 12/20/2022    GFRAA >60 05/21/2014    AGRATIO 1.6 12/20/2022       Discussed use, benefit, and side effects of prescribed medications. Barriers to medication compliance addressed. All patient questions answered. Pt voiced understanding. RTC Return in about 4 weeks (around 3/14/2023). No future appointments.       Kurt Salazar MD  2/14/2023  12:45 PM

## 2023-02-16 DIAGNOSIS — F41.9 ANXIETY: ICD-10-CM

## 2023-02-16 DIAGNOSIS — F33.1 MODERATE EPISODE OF RECURRENT MAJOR DEPRESSIVE DISORDER (HCC): ICD-10-CM

## 2023-02-16 RX ORDER — VENLAFAXINE HYDROCHLORIDE 75 MG/1
CAPSULE, EXTENDED RELEASE ORAL
Qty: 90 CAPSULE | Refills: 0 | Status: SHIPPED | OUTPATIENT
Start: 2023-02-16

## 2023-02-16 NOTE — TELEPHONE ENCOUNTER
Medication:   Requested Prescriptions     Pending Prescriptions Disp Refills    venlafaxine (EFFEXOR XR) 75 MG extended release capsule [Pharmacy Med Name: Venlafaxine HCl ER Oral Capsule Extended Release 24 Hour 75 MG] 90 capsule 0     Sig: TAKE 1 CAPSULE BY MOUTH EVERY DAY        Last Filled:  1/27/23    Patient Phone Number: [phone] (home)     Last appt: 2/14/2023   Next appt: Visit date not found    Last OARRS: No flowsheet data found.

## 2023-02-17 ENCOUNTER — TELEPHONE (OUTPATIENT)
Dept: PRIMARY CARE CLINIC | Age: 53
End: 2023-02-17

## 2023-02-17 NOTE — TELEPHONE ENCOUNTER
Pt called and insurance is not covering the 5.0  on the [facility] . She said the 2.5 doesn't help.  She is upset about this and would like to know if there is anything she can do to get this medication

## 2023-02-17 NOTE — TELEPHONE ENCOUNTER
She can call her insurance and see if Get Masy is covered instead. This might work better for her.      Dr. Lydia Willis

## 2023-02-20 NOTE — TELEPHONE ENCOUNTER
Pt called back they will not cover either medication for weight loss. She said they told her they only filled Mounjaro  the one time as a courtesy.

## 2023-02-21 ENCOUNTER — TELEPHONE (OUTPATIENT)
Dept: PRIMARY CARE CLINIC | Age: 53
End: 2023-02-21

## 2023-02-21 NOTE — TELEPHONE ENCOUNTER
Pt call in stating that her insurance will not cover no medication for weight loss and is very unhappy with the weight that she has . And would like to know what could she do now. But states that she was already taking MOUNJARO. Please call with any information. [address] you can leave .

## 2023-02-21 NOTE — TELEPHONE ENCOUNTER
If her insurance states they will not cover anything, then she can pay out of pocket. Otherwise, I do not have any other options for meds. She can try an oral tablet for 3 months called NiftyThriftyex if her blood pressure is normal. This is usually covered. She can call and ask her insurance before coming in for an appt to get it.      Thank you,   Dr. Jackie Conley

## 2023-02-22 ENCOUNTER — TELEPHONE (OUTPATIENT)
Dept: PRIMARY CARE CLINIC | Age: 53
End: 2023-02-22

## 2023-02-22 NOTE — TELEPHONE ENCOUNTER
Tirzepatide (MOUNJARO) 5 MG/0.5ML SOPN SC injection     Can we start a PA on this medication Thanks.

## 2023-02-22 NOTE — TELEPHONE ENCOUNTER
Submitted PA for [facility] 5MG/0.5ML pen-injectors  Via CMM Key: [id number] STATUS: PENDING

## 2023-02-22 NOTE — TELEPHONE ENCOUNTER
Tirzepatide [facility]) 5 MG/0.5ML SOPN SC injection [[id number]]     Order Details  Dose: 5 mg Route: SubCUTAneous Frequency: WEEKLY   Dispense Quantity: 2 mL Refills: 1          Sig: Inject 0.5 mLs into the skin once a week         Start Date: 02/14/23 End Date: --   Written Date: 02/14/23 Expiration Date: 02/14/24   Providers    Authorizing Provider: Sagar Cummings MD NPI: [id number]   Ordering User:  Sagar Cummings MD          Pharmacy    [facility] [address] [facility] [address] Jeff Lyn [phone] - F [phone]   [address] [place] [address]   Phone:  [phone]  Fax:  [phone]     Order Class:    Order Class   Normal [1]     Warnings Override History    No Interaction Warnings Shown      Tirzepatide [facility]) 5 MG/0.5ML SOPN SC injection  Date: 2/14/2023 Department:  [facility] Ordering/Authorizing: Sagar Cummings MD     Outpatient Medication Detail     Disp Refills Start End    Tirzepatide [facility]) 5 MG/0.5ML SOPN SC injection 2 mL 1 2/14/2023     Sig - Route: Inject 0.5 mLs into the skin once a week - SubCUTAneous    Sent to pharmacy as: Tirzepatide 5 MG/0.5ML Subcutaneous Solution Pen-injector [facility])    E-Prescribing Status: Receipt confirmed by pharmacy (2/14/2023  8:42 AM EST)    Prior authorization: Closed - Prior Authorization not required for patient/medication

## 2023-02-27 ENCOUNTER — OFFICE VISIT (OUTPATIENT)
Dept: SURGERY | Age: 53
End: 2023-02-27
Payer: COMMERCIAL

## 2023-02-27 VITALS
TEMPERATURE: 98.7 F | OXYGEN SATURATION: 98 % | BODY MASS INDEX: 33.67 KG/M2 | DIASTOLIC BLOOD PRESSURE: 91 MMHG | HEART RATE: 75 BPM | WEIGHT: 215 LBS | SYSTOLIC BLOOD PRESSURE: 138 MMHG

## 2023-02-27 DIAGNOSIS — L98.9 SCALP LESION: Primary | ICD-10-CM

## 2023-02-27 PROCEDURE — 1036F TOBACCO NON-USER: CPT

## 2023-02-27 PROCEDURE — 99204 OFFICE O/P NEW MOD 45 MIN: CPT

## 2023-02-27 PROCEDURE — 3017F COLORECTAL CA SCREEN DOC REV: CPT

## 2023-02-27 PROCEDURE — G8417 CALC BMI ABV UP PARAM F/U: HCPCS

## 2023-02-27 PROCEDURE — G8484 FLU IMMUNIZE NO ADMIN: HCPCS

## 2023-02-27 PROCEDURE — G8427 DOCREV CUR MEDS BY ELIG CLIN: HCPCS

## 2023-02-27 NOTE — TELEPHONE ENCOUNTER
Pharmacy has script its still popping back the medication needs a PA . They are faxing over the response  they are getting. Pt would like someone to call her and let her know what is going on. .

## 2023-02-27 NOTE — PROGRESS NOTES
[facility]    CC: Scalp lesion    Referring Physician: Nathalie Thomas MD.    HPI: This is an 47 y. o.female with a PMHx as delineated below who presents to clinic in consultation for scalp lesion. The patient noticed the lesion for approximately a little over a year. She notes that it has grown in size and is painful when she styles her hair. She wishes to have this surgically removed. Plastic surgery was consulted for evaluation and treatment. PMHx:   Past Medical History:   Diagnosis Date    Anxiety     Gastroenteritis     SVT (supraventricular tachycardia) (HCC)      PSHx:   Past Surgical History:   Procedure Laterality Date    HYSTEROTOMY       Allergy: No Known Allergies    SHx:   Social History     Socioeconomic History    Marital status: Single     Spouse name: Not on file    Number of children: Not on file    Years of education: Not on file    Highest education level: Not on file   Occupational History    Not on file   Tobacco Use    Smoking status: Never    Smokeless tobacco: Never   Substance and Sexual Activity    Alcohol use: Not Currently    Drug use: No    Sexual activity: Not on file   Other Topics Concern    Not on file   Social History Narrative    Not on file     Social Determinants of Health     Financial Resource Strain: Low Risk     Difficulty of Paying Living Expenses: Not hard at all   Food Insecurity: No Food Insecurity    Worried About Running Out of Food in the Last Year: Never true    Ran Out of Food in the Last Year: Never true   Transportation Needs: Unknown    Lack of Transportation (Medical): Not on file    Lack of Transportation (Non-Medical):  No   Physical Activity: Not on file   Stress: Not on file   Social Connections: Not on file   Intimate Partner Violence: Not on file   Housing Stability: Unknown    Unable to Pay for Housing in the Last Year: Not on file    Number of Places Lived in the Last Year: Not on file    Unstable Housing in the Last Year: No     FHx: Family history of skin [place]: none  Meds:   Current Outpatient Medications   Medication Sig Dispense Refill    venlafaxine (EFFEXOR XR) 75 MG extended release capsule TAKE 1 CAPSULE BY MOUTH EVERY DAY 90 capsule 0    Tirzepatide (MOUNJARO) 5 MG/0.5ML SOPN SC injection Inject 0.5 mLs into the skin once a week 2 mL 1    ferrous gluconate (FERGON) 324 (38 Fe) MG tablet TAKE 1 TABLET BY MOUTH TWO TIMES A DAY FOR ANEMIA 180 tablet 3     No current facility-administered medications for this visit. ROS   Constitutional: Negative for chills and fever. HENT: Negative for congestion, facial swelling, and voice change. Eyes: Negative for photophobia and visual disturbance. Respiratory: Negative for apnea, cough, chest tightness and shortness of breath. Cardiovascular: Negative for chest pain and palpitations. Gastrointestinal: Negative for dysphagia and early satiety. Genitourinary: Negative for difficulty urinating, dysuria, flank pain, frequency and hematuria. Musculoskeletal: Negative for new gait problem, joint swelling and myalgias. Skin: Negative for color change, pallor and rash. Endocrine: negative for tremors, temperature intolerance or polydipsia. Allergic/Immunologic: Negative for new environmental or food allergies. Neurological: Negative for dizziness, seizures, speech difficulty, numbness. Hematological: Negative for adenopathy. Psychiatric/Behavioral: Negative for agitation and confusion. EXAM     BP (!) 138/91   Pulse 75   Temp 98.7 °F (37.1 °C)   Wt 215 lb (97.5 kg)   LMP 09/25/2020   SpO2 98%   BMI 33.67 kg/m²     GEN: NAD  FACE: Soft mobile mass 0.3 cm x 0.3 cm on left scalp    PATHOLOGY/WORKUP: none    IMP: 47 y. o.female with scalp lesion. PLAN: We will submit to insurance and work to schedule under [facility]. Dr. Jose David Sanders stopped and discussed surgery with patient as well.      A discussion regarding surgical options including: scalp lesion excision was performed with the patient. The pathophysiology of scalp lesion was also elucidated specifying need for resection, observation, & margins. Clinical photos were obtained. Additionally, discussion regarding the risks including, but not limited to: bleeding (potentially requiring transfusion or reoperation), infection, seroma, reoperation, poor cosmetic outcome, scarring,  revisional surgery, diminished sensation, VTE (DVT/PE), and death was performed. All questions were answered in a satisfactory manner.      Kirill Marquez, APRN - [address] [facility]  [phone]  02/27/23

## 2023-03-02 ENCOUNTER — TELEPHONE (OUTPATIENT)
Dept: SURGERY | Age: 53
End: 2023-03-02

## 2023-03-02 NOTE — TELEPHONE ENCOUNTER
The patient was in the office to see Shaneka [facility] 2-. PLAN: We will submit to insurance and work to schedule under local at [facility]. Dr. Soumya Islas stopped and discussed surgery with patient as well. I received a surgery letter. I faxed a 2003 [facility] Medical Prior Authorization Request Form and clinicals today. I will scan the information that I faxed and the fax success into Epic under the media tab. I tried to call the patient on the mobile number listed, but received a message that the subscriber is not in service. I left a generic message on the home number listed, since I am not sure that this is her number. I will leave this phone note open.

## 2023-03-03 ENCOUNTER — TELEPHONE (OUTPATIENT)
Dept: PRIMARY CARE CLINIC | Age: 53
End: 2023-03-03

## 2023-03-03 DIAGNOSIS — E66.9 OBESITY (BMI 30-39.9): Primary | ICD-10-CM

## 2023-03-03 RX ORDER — SEMAGLUTIDE 0.25 MG/.5ML
0.25 INJECTION, SOLUTION SUBCUTANEOUS
Qty: 2 ML | Refills: 3 | Status: SHIPPED | OUTPATIENT
Start: 2023-03-03

## 2023-03-03 NOTE — TELEPHONE ENCOUNTER
The system states it is not covered. I ordered it though. We'll see what happens.    Dr. Etelvina Quevedo

## 2023-03-03 NOTE — TELEPHONE ENCOUNTER
I returned the patients call mentioned below at the number provided. I received a message that the subscriber I dialed is not in service. If the patient calls back please let me know. I will leave this phone note open.

## 2023-03-03 NOTE — TELEPHONE ENCOUNTER
I returned the patients call mentioned below. I lmom and asked for a call back to discuss insurance and surgery scheduling. I will leave this phone note open.

## 2023-03-03 NOTE — TELEPHONE ENCOUNTER
I received a call back from the patient. She stated that the best number to reach her is [phone]. She was provided an insurance update and aware that I will reach out to her once I hear back from Xiomara Bermudez. I will leave this phone note open.  [phone]

## 2023-03-06 NOTE — TELEPHONE ENCOUNTER
I received a fax from Xiomara Bermudez dated 3-3-2023. I will scan the fax into Epic under the media tab. APPROVED  Date Range Of Approval 3-2-2023 to 6-2-2023  Service Approved 3-2-2023 to 6-2-2023  Authorization Number [id number]    I lmom for the patient at [phone]. I requested a call back to discuss insurance and surgery scheduling. I will leave this phone note open.

## 2023-03-07 ENCOUNTER — TELEPHONE (OUTPATIENT)
Dept: PRIMARY CARE CLINIC | Age: 53
End: 2023-03-07

## 2023-03-07 NOTE — TELEPHONE ENCOUNTER
Semaglutide-Weight Management (WEGOVY) 0.25 MG/0.5ML SOAJ SC injection [[id number]]     Order Details  Dose: 0.25 mg Route: SubCUTAneous Frequency: EVERY 7 DAYS   Dispense Quantity: 2 mL Refills: 3          Sig: Inject 0.25 mg into the skin every 7 days         Start Date: 03/03/23 End Date: --   Written Date: 03/03/23 Expiration Date: 03/02/24       Diagnosis Association: Obesity (BMI 30-39.9) (E66.9)   Providers    Authorizing Provider: Erin Edge MD NPI: [id number]   Ordering User:  Erin Edge MD          Pharmacy    [facility] [address] [facility] [address] Jacki Robins [phone] - F [phone]   [address] [place] [id number]   Phone:  [phone]  Fax:  [phone] Patent

## 2023-03-07 NOTE — TELEPHONE ENCOUNTER
Semaglutide-Weight Management (WEGOVY) 0.25 MG/0.5ML SOAJ SC injection     Can we start a PA for this medication. Thank you.

## 2023-03-07 NOTE — TELEPHONE ENCOUNTER
I spoke with the patient at the home number listed. The patient is now scheduled for surgery with  on 4-7-2023.    The patient is aware of H&P.    The patient is scheduled for her post op appointment 4-.    I will fax the surgery letter to [facility]bert today. I will scan the letter and the fax success into Epic under the media tab.    I will mail the surgery information and instructions to the patient today.    I will close this phone note.

## 2023-03-08 NOTE — TELEPHONE ENCOUNTER
Submitted PA for [place] 0.25MG/0.5ML auto-injectors  Via CMM Key: [id number] STATUS: DENIED; Denial letter attached. If this requires a response please respond to the pool ( P [facility] [address]). Thank you please advise patient.

## 2023-03-09 ENCOUNTER — TELEPHONE (OUTPATIENT)
Dept: PRIMARY CARE CLINIC | Age: 53
End: 2023-03-09

## 2023-03-09 DIAGNOSIS — E66.9 OBESITY (BMI 30-39.9): Primary | ICD-10-CM

## 2023-03-09 RX ORDER — SEMAGLUTIDE 1.34 MG/ML
0.5 INJECTION, SOLUTION SUBCUTANEOUS WEEKLY
Qty: 1.5 ML | Refills: 3 | Status: SHIPPED | OUTPATIENT
Start: 2023-03-09 | End: 2023-04-08

## 2023-03-09 NOTE — TELEPHONE ENCOUNTER
Pt call in and states that she spoke with the insurance and they told her that the OLI WADE will be denied and to send in a script for [facility] as they say this one will be covered. [facility] will need a PA as well .

## 2023-03-09 NOTE — TELEPHONE ENCOUNTER
Semaglutide,0.25 or 0.5MG/DOS, (OZEMPIC, 0.25 OR 0.5 MG/DOSE,) 2 MG/1.5ML SOPN [[id number]]    Order Details      Dose: 0.5 mg Route: SubCUTAneous Frequency: WEEKLY   Dispense Quantity: 1.5 mL Refills: 3          Sig: Inject 0.5 mg into the skin once a week         Start Date: 03/09/23 End Date: 04/08/23 after 5 doses   Written Date: 03/09/23 Expiration Date: 03/08/24       Diagnosis Association: Obesity (BMI 30-39.9) (E66.9)         Providers      Authorizing Provider: Christie Escobar MD NPI: [id number]   Ordering User:  Christie Escobar MD             Pharmacy      [facility] [address] [facility] [place] [phone] - F [phone]    [address] [place] [id number]   Phone:  [phone]  Fax:  [phone]

## 2023-03-13 NOTE — TELEPHONE ENCOUNTER
OK. So nothing is covered. The last medication option is to get her blood pressure to normal so a daily oral med can be ordered.      Dr. Erlinda Cano

## 2023-03-14 ENCOUNTER — TELEPHONE (OUTPATIENT)
Dept: PRIMARY CARE CLINIC | Age: 53
End: 2023-03-14

## 2023-03-14 NOTE — TELEPHONE ENCOUNTER
Semaglutide,0.25 or 0.5MG/DOS, (OZEMPIC, 0.25 OR 0.5 MG/DOSE,) 2 MG/1.5ML SOPN [[id number]]     Order Details  Dose: 0.5 mg Route: SubCUTAneous Frequency: WEEKLY   Dispense Quantity: 1.5 mL Refills: 3          Sig: Inject 0.5 mg into the skin once a week         Start Date: 03/09/23 End Date: 04/08/23 after 5 doses   Written Date: 03/09/23 Expiration Date: 03/08/24       Diagnosis Association: Obesity (BMI 30-39.9) (E66.9)   Providers    Authorizing Provider: Antoinette Elaine MD NPI: [id number]   Ordering User:  Antoinette Elaine MD          Pharmacy    [facility] [address] [facility] [address] Gonzalez Watkins [phone] - F [phone]   [address] [place] [address]   Phone:  [phone]  Fax:  [phone]

## 2023-03-15 ENCOUNTER — TELEPHONE (OUTPATIENT)
Dept: PRIMARY CARE CLINIC | Age: 53
End: 2023-03-15

## 2023-03-15 NOTE — TELEPHONE ENCOUNTER
Pt understand that none of the medication you sent in was cover by her insurance and would like something for weigh lost.

## 2023-03-15 NOTE — TELEPHONE ENCOUNTER
PA for Ozempic (0.25 or 0.5 MG/DOSE) 2MG/1.5ML pen-injectors was submitted in 03/09/23 telephone encounter.

## 2023-03-15 NOTE — TELEPHONE ENCOUNTER
Submitted PA for Ozempic (0.25 or 0.5 MG/DOSE) 2MG/1.5ML pen-injectors  Via CMM Key: [id number] STATUS: DENIED as a Plan Exclusion; Denial letter attached. If this requires a response please respond to the pool ( P [facility]). Thank you please advise patient.

## 2023-03-31 NOTE — PROGRESS NOTES
Berle Sensing    Age 47 y.o.    female    1970    MRN [id number]    4/7/2023  Arrival Time_____________  OR Time____________25 Min     Procedure(s):  EXCISION OF SCALP LESION, POSSIBLE ADJACENT TISSUE TRANSFER                      Local    Surgeon(s):  León Apa, MD       Phone [phone] ([place])     [address]  Cell         Work  _____________________________________________________________________  _____________________________________________________________________  _____________________________________________________________________  _____________________________________________________________________  _____________________________________________________________________    PCP _____________________________ Phone_________________     H&P__________________Bringing      Chart            Epic   DOS      Called________  EKG__________________Bringing      Chart            Epic   DOS      Called________  LAB__________________ Bringing      Chart            Epic   DOS      Called________  Cardiac Clearance_______Bringing      Chart            Epic      DOS      Called________    Cardiologist________________________ Phone___________________________    ? Jew concerns / Waiver on Chart            PAT Communications________________  ? Pre-op Instructions Given [place]          _________________________________  ? Directions to Surgery Center                          _________________________________  ? Transportation Home_______________      __________________________________  ?  Crutches/Walker__________________        __________________________________    ________Pre-op Orders   _______Transcribed    _______Wt.  ________Pharmacy          _______SCD  ______VTE     ______TED [place]  _______  Surgery Consent    _______  Anesthesia Consent         COVID DATE______________LOCATION________________ RESULT__________

## 2023-04-07 ENCOUNTER — HOSPITAL ENCOUNTER (OUTPATIENT)
Age: 53
Setting detail: OUTPATIENT SURGERY
Discharge: HOME | End: 2023-04-07
Attending: SURGERY | Admitting: SURGERY
Payer: COMMERCIAL

## 2023-04-07 VITALS
SYSTOLIC BLOOD PRESSURE: 129 MMHG | WEIGHT: 185 LBS | RESPIRATION RATE: 20 BRPM | HEART RATE: 85 BPM | BODY MASS INDEX: 29.03 KG/M2 | HEIGHT: 67 IN | DIASTOLIC BLOOD PRESSURE: 83 MMHG | TEMPERATURE: 97.1 F | OXYGEN SATURATION: 97 %

## 2023-04-07 DIAGNOSIS — L98.9 SCALP LESION: ICD-10-CM

## 2023-04-07 PROCEDURE — 2500000003 HC RX 250 WO HCPCS: Performed by: SURGERY

## 2023-04-07 PROCEDURE — 2580000003 HC RX 258: Performed by: SURGERY

## 2023-04-07 PROCEDURE — A4217 STERILE WATER/SALINE, 500 ML: HCPCS | Performed by: SURGERY

## 2023-04-07 RX ORDER — LIDOCAINE HYDROCHLORIDE AND EPINEPHRINE 10; 10 MG/ML; UG/ML
INJECTION, SOLUTION INFILTRATION; PERINEURAL PRN
Status: DISCONTINUED | OUTPATIENT
Start: 2023-04-07 | End: 2023-04-07 | Stop reason: ALTCHOICE

## 2023-04-07 RX ORDER — MAGNESIUM HYDROXIDE 1200 MG/15ML
LIQUID ORAL CONTINUOUS PRN
Status: COMPLETED | OUTPATIENT
Start: 2023-04-07 | End: 2023-04-07

## 2023-04-07 ASSESSMENT — PAIN SCALES - GENERAL: PAINLEVEL_OUTOF10: 0

## 2023-04-07 ASSESSMENT — PAIN - FUNCTIONAL ASSESSMENT: PAIN_FUNCTIONAL_ASSESSMENT: 0-10

## 2023-04-07 NOTE — DISCHARGE INSTRUCTIONS
[address]    Aysha Ruiz MD  [address] [place] [address] ([address] [place] [address]  [phone]    Post-op Instructions  ___________________________________________    Skin Lesion Removal Instructions    The following instructions will help you know what to expect in the days following your surgery. Do not, however, hesitate to call if you have questions or concerns. Activities   You may resume your regular activity. However,  avoid vigorous activity until seen after your 1 week visit. Diet   Resume your preoperative diet as tolerated. Increasing the amount of protein and eliminating unhealthy fats can improve your wound healing and lead to an improved outcome. Special Instructions / Medications  Follow these instructions for another 2 weeks AFTER your surgery     There is absolutely NO driving while on pain medication or Valium®     Do NOT take any of the following products:   Aspirin/low-dose aspirin   Ibuprofen (Advil®, Motrin®)   Naprosyn or Naproxen (Aleve®)   Vitamin E (even small amounts in multiple vitamins)   Herbals or homeopathic medicines or green tea   Growth hormone   Diet pills (Meridia®, Metabolife®, etc)      TYLENOL-containing products (acetaminophen) are safe to take. (If you are not sure what products to take or avoid, call the office.)    Pain Control   You may be prescribed a narcotic pain medication for the initial postoperative period. Take only as instructed as needed for pain. As mentioned above, you can take Tylenol for your pain control, however some pain medication may have Tylenol included in the ingredients (e.g. Percocet®, Vicodin®). Make sure that you do not take more than 4 grams of Tylenol in a single day. If you have any questions, you can contact the office. Wound Care   Keep your bandage clean and dry for 24 hours (if you have one)   After 24 hours, the bandage may be removed and you can shower.    If you have flesh colored

## 2023-04-07 NOTE — H&P
[facility]     CC: Scalp lesion     Referring Physician: Carlos Santos MD.     HPI: This is an 47 y. o.female with a PMHx as delineated below who presents to clinic in consultation for scalp lesion. The patient noticed the lesion for approximately a little over a year. She notes that it has grown in size and is painful when she styles her hair. She wishes to have this surgically removed. Plastic surgery was consulted for evaluation and treatment. Since her last evaluation with Anselmo Jackson NP, she notes no changes in her medical history. PMHx:   Past Medical History        Past Medical History:   Diagnosis Date    Anxiety      Gastroenteritis      SVT (supraventricular tachycardia) (HCC)           PSHx:   Past Surgical History         Past Surgical History:   Procedure Laterality Date    HYSTEROTOMY             Allergy: No Known Allergies     SHx:   Social History               Socioeconomic History    Marital status: Single       Spouse name: Not on file    Number of children: Not on file    Years of education: Not on file    Highest education level: Not on file   Occupational History    Not on file   Tobacco Use    Smoking status: Never    Smokeless tobacco: Never   Substance and Sexual Activity    Alcohol use: Not Currently    Drug use: No    Sexual activity: Not on file   Other Topics Concern    Not on file   Social History Narrative    Not on file      Social Determinants of Health          Financial Resource Strain: Low Risk     Difficulty of Paying Living Expenses: Not hard at all   Food Insecurity: No Food Insecurity    Worried About Running Out of Food in the Last Year: Never true    Ran Out of Food in the Last Year: Never true   Transportation Needs: Unknown    Lack of Transportation (Medical): Not on file    Lack of Transportation (Non-Medical):  No   Physical Activity: Not on file   Stress: Not on file   Social Connections: Not on file   Intimate Partner Violence: Not on

## 2023-04-17 ENCOUNTER — OFFICE VISIT (OUTPATIENT)
Dept: SURGERY | Age: 53
End: 2023-04-17

## 2023-04-17 VITALS
WEIGHT: 185 LBS | OXYGEN SATURATION: 98 % | SYSTOLIC BLOOD PRESSURE: 154 MMHG | TEMPERATURE: 97.4 F | DIASTOLIC BLOOD PRESSURE: 105 MMHG | HEART RATE: 88 BPM | BODY MASS INDEX: 28.98 KG/M2

## 2023-04-17 DIAGNOSIS — Z09 POSTOP CHECK: Primary | ICD-10-CM

## 2023-04-17 PROCEDURE — 99024 POSTOP FOLLOW-UP VISIT: CPT

## 2023-04-17 NOTE — PROGRESS NOTES
[facility]    PROCEDURE:  1) Excision of scalp lesion (2.1 x 2 cm)                                                  2) Complex closure (2.7 cm)  DATE: 04/7/23    Maximiliano Lacey has been recovering well since her procedure. Pain has been well controlled without pain medications. EXAM    BP (!) 154/105   Pulse 88   Temp 97.4 °F (36.3 °C)   Wt 185 lb (83.9 kg)   LMP 09/25/2020   SpO2 98%   BMI 28.98 kg/m²       GEN: NAD   FACE: scalp incision site healing appropriately. No hematoma/seroma. PATHOLOGY: FINAL DIAGNOSIS:     Skin, scalp, excision:   - Epidermal inclusion cyst.     IMP: 47 y. o.female s/p excision of scalp lesion with complex closure  PLAN: Doing well overall. Patient happy with the results. Will follow up as needed.      Arianna Kirk, APRN - [facility]  [phone]  04/17/23

## 2023-06-26 ENCOUNTER — HOSPITAL ENCOUNTER (OUTPATIENT)
Age: 53
Discharge: HOME | End: 2023-06-26
Payer: COMMERCIAL

## 2023-06-26 ENCOUNTER — HOSPITAL ENCOUNTER (OUTPATIENT)
Dept: GENERAL RADIOLOGY | Age: 53
Discharge: HOME | End: 2023-06-26
Payer: COMMERCIAL

## 2023-06-26 ENCOUNTER — OFFICE VISIT (OUTPATIENT)
Dept: PRIMARY CARE CLINIC | Age: 53
End: 2023-06-26
Payer: COMMERCIAL

## 2023-06-26 VITALS
HEART RATE: 88 BPM | DIASTOLIC BLOOD PRESSURE: 78 MMHG | SYSTOLIC BLOOD PRESSURE: 131 MMHG | BODY MASS INDEX: 34.33 KG/M2 | TEMPERATURE: 97.7 F | WEIGHT: 219.2 LBS

## 2023-06-26 DIAGNOSIS — G89.29 CHRONIC BILATERAL LOW BACK PAIN WITHOUT SCIATICA: ICD-10-CM

## 2023-06-26 DIAGNOSIS — M54.50 CHRONIC BILATERAL LOW BACK PAIN WITHOUT SCIATICA: ICD-10-CM

## 2023-06-26 DIAGNOSIS — M54.50 CHRONIC BILATERAL LOW BACK PAIN WITHOUT SCIATICA: Primary | ICD-10-CM

## 2023-06-26 DIAGNOSIS — G89.29 CHRONIC BILATERAL LOW BACK PAIN WITHOUT SCIATICA: Primary | ICD-10-CM

## 2023-06-26 DIAGNOSIS — E66.9 OBESITY (BMI 30-39.9): ICD-10-CM

## 2023-06-26 PROCEDURE — G8427 DOCREV CUR MEDS BY ELIG CLIN: HCPCS | Performed by: STUDENT IN AN ORGANIZED HEALTH CARE EDUCATION/TRAINING PROGRAM

## 2023-06-26 PROCEDURE — G8417 CALC BMI ABV UP PARAM F/U: HCPCS | Performed by: STUDENT IN AN ORGANIZED HEALTH CARE EDUCATION/TRAINING PROGRAM

## 2023-06-26 PROCEDURE — 72100 X-RAY EXAM L-S SPINE 2/3 VWS: CPT

## 2023-06-26 PROCEDURE — 99213 OFFICE O/P EST LOW 20 MIN: CPT | Performed by: STUDENT IN AN ORGANIZED HEALTH CARE EDUCATION/TRAINING PROGRAM

## 2023-06-26 PROCEDURE — 3017F COLORECTAL CA SCREEN DOC REV: CPT | Performed by: STUDENT IN AN ORGANIZED HEALTH CARE EDUCATION/TRAINING PROGRAM

## 2023-06-26 PROCEDURE — 1036F TOBACCO NON-USER: CPT | Performed by: STUDENT IN AN ORGANIZED HEALTH CARE EDUCATION/TRAINING PROGRAM

## 2023-06-26 RX ORDER — CYCLOBENZAPRINE HCL 10 MG
10 TABLET ORAL NIGHTLY PRN
Qty: 30 TABLET | Refills: 0 | Status: SHIPPED | OUTPATIENT
Start: 2023-06-26 | End: 2023-07-26

## 2023-06-26 ASSESSMENT — ENCOUNTER SYMPTOMS: BACK PAIN: 1

## 2023-07-07 ENCOUNTER — HOSPITAL ENCOUNTER (OUTPATIENT)
Dept: PHYSICAL THERAPY | Age: 53
Setting detail: THERAPIES SERIES
Discharge: HOME | End: 2023-07-07
Payer: COMMERCIAL

## 2023-07-07 PROCEDURE — 97161 PT EVAL LOW COMPLEX 20 MIN: CPT | Performed by: PHYSICAL THERAPIST

## 2023-07-07 PROCEDURE — 97110 THERAPEUTIC EXERCISES: CPT | Performed by: PHYSICAL THERAPIST

## 2023-07-07 NOTE — THERAPY EVALUATION
will have a decrease in pain to facilitate improvement in movement, function, and ADLs as indicated by Functional Deficits. [] Progressing: [] Met: [x] Not Met: [] Adjusted      Long Term Goals: To be achieved in: 8 weeks  1. Disability index score of % or less for Mod Oswestry to assist with reaching prior level of function. -NV  [] Progressing: [] Met: [] Not Met: [] Adjusted  2. Patient will demonstrate increased AROM to WNL and symmetrical hip ROM to allow for proper joint functioning as indicated by patients Functional Deficits. [] Progressing: [] Met: [x] Not Met: [] Adjusted  3. Patient will demonstrate an increase in Strength to >/= 4+/5 proximal hip and core activation to allow for proper functional mobility as indicated by patients Functional Deficits. [] Progressing: [] Met: [x] Not Met: [] Adjusted  4. Patient will return to forward bending and stair navigation (2 flights) functional activities without increased symptoms or restriction. [] Progressing: [] Met: [x] Not Met: [] Adjusted  5. Pt will demonstrate good lifting mechanics.    [] Progressing: [] Met: [x] Not Met: [] Adjusted       Electronically signed by:  David Mercado PT DPT

## 2023-07-17 DIAGNOSIS — F33.1 MODERATE EPISODE OF RECURRENT MAJOR DEPRESSIVE DISORDER (HCC): ICD-10-CM

## 2023-07-17 DIAGNOSIS — F41.9 ANXIETY: ICD-10-CM

## 2023-07-17 RX ORDER — VENLAFAXINE HYDROCHLORIDE 75 MG/1
CAPSULE, EXTENDED RELEASE ORAL
Qty: 90 CAPSULE | Refills: 0 | Status: SHIPPED | OUTPATIENT
Start: 2023-07-17

## 2023-07-17 NOTE — TELEPHONE ENCOUNTER
Medication:   Requested Prescriptions     Pending Prescriptions Disp Refills    venlafaxine (EFFEXOR XR) 75 MG extended release capsule [Pharmacy Med Name: Venlafaxine HCl ER Oral Capsule Extended Release 24 Hour 75 MG] 90 capsule 0     Sig: TAKE 1 CAPSULE BY MOUTH EVERY DAY        Last Filled:  2/16/23    Patient Phone Number: [phone] (home)     Last appt: 6/26/2023   Next appt: Visit date not found    Last OARRS: No flowsheet data found.

## 2023-08-19 ENCOUNTER — HOSPITAL ENCOUNTER (EMERGENCY)
Age: 53
Discharge: HOME | End: 2023-08-19
Attending: EMERGENCY MEDICINE
Payer: COMMERCIAL

## 2023-08-19 ENCOUNTER — APPOINTMENT (OUTPATIENT)
Dept: GENERAL RADIOLOGY | Age: 53
End: 2023-08-19
Payer: COMMERCIAL

## 2023-08-19 VITALS
RESPIRATION RATE: 21 BRPM | SYSTOLIC BLOOD PRESSURE: 114 MMHG | OXYGEN SATURATION: 99 % | TEMPERATURE: 98.2 F | HEART RATE: 85 BPM | DIASTOLIC BLOOD PRESSURE: 89 MMHG | WEIGHT: 221 LBS | BODY MASS INDEX: 34.61 KG/M2

## 2023-08-19 DIAGNOSIS — I47.1 PAROXYSMAL SUPRAVENTRICULAR TACHYCARDIA (HCC): Primary | ICD-10-CM

## 2023-08-19 LAB
ALBUMIN SERPL-MCNC: 4.8 G/DL (ref 3.4–5)
ALBUMIN/GLOB SERPL: 1.2 {RATIO} (ref 1.1–2.2)
ALP SERPL-CCNC: 124 U/L (ref 40–129)
ALT SERPL-CCNC: 16 U/L (ref 10–40)
ANION GAP SERPL CALCULATED.3IONS-SCNC: 17 MMOL/L (ref 3–16)
AST SERPL-CCNC: 37 U/L (ref 15–37)
BASOPHILS # BLD: 0.1 K/UL (ref 0–0.2)
BASOPHILS NFR BLD: 0.6 %
BILIRUB SERPL-MCNC: 0.9 MG/DL (ref 0–1)
BUN SERPL-MCNC: 15 MG/DL (ref 7–20)
CALCIUM SERPL-MCNC: 10 MG/DL (ref 8.3–10.6)
CHLORIDE SERPL-SCNC: 102 MMOL/L (ref 99–110)
CO2 SERPL-SCNC: 20 MMOL/L (ref 21–32)
CREAT SERPL-MCNC: 0.9 MG/DL (ref 0.6–1.1)
DEPRECATED RDW RBC AUTO: 13.5 % (ref 12.4–15.4)
EOSINOPHIL # BLD: 0.2 K/UL (ref 0–0.6)
EOSINOPHIL NFR BLD: 1.7 %
GFR SERPLBLD CREATININE-BSD FMLA CKD-EPI: >60 ML/MIN/{1.73_M2}
GLUCOSE SERPL-MCNC: 117 MG/DL (ref 70–99)
HCT VFR BLD AUTO: 54.2 % (ref 36–48)
HGB BLD-MCNC: 18.2 G/DL (ref 12–16)
LYMPHOCYTES # BLD: 2.9 K/UL (ref 1–5.1)
LYMPHOCYTES NFR BLD: 23 %
MCH RBC QN AUTO: 29.8 PG (ref 26–34)
MCHC RBC AUTO-ENTMCNC: 33.5 G/DL (ref 31–36)
MCV RBC AUTO: 88.9 FL (ref 80–100)
MONOCYTES # BLD: 0.9 K/UL (ref 0–1.3)
MONOCYTES NFR BLD: 6.8 %
NEUTROPHILS # BLD: 8.5 K/UL (ref 1.7–7.7)
NEUTROPHILS NFR BLD: 67.9 %
NT-PROBNP SERPL-MCNC: 81 PG/ML (ref 0–124)
PLATELET # BLD AUTO: 282 K/UL (ref 135–450)
PMV BLD AUTO: 9.6 FL (ref 5–10.5)
POTASSIUM SERPL-SCNC: 4.6 MMOL/L (ref 3.5–5.1)
PROT SERPL-MCNC: 8.9 G/DL (ref 6.4–8.2)
RBC # BLD AUTO: 6.1 M/UL (ref 4–5.2)
SODIUM SERPL-SCNC: 139 MMOL/L (ref 136–145)
TROPONIN, HIGH SENSITIVITY: 23 NG/L (ref 0–14)
TROPONIN, HIGH SENSITIVITY: 38 NG/L (ref 0–14)
WBC # BLD AUTO: 12.5 K/UL (ref 4–11)

## 2023-08-19 PROCEDURE — 85025 COMPLETE CBC W/AUTO DIFF WBC: CPT

## 2023-08-19 PROCEDURE — 6360000002 HC RX W HCPCS: Performed by: EMERGENCY MEDICINE

## 2023-08-19 PROCEDURE — 6370000000 HC RX 637 (ALT 250 FOR IP): Performed by: EMERGENCY MEDICINE

## 2023-08-19 PROCEDURE — 99285 EMERGENCY DEPT VISIT HI MDM: CPT

## 2023-08-19 PROCEDURE — 96374 THER/PROPH/DIAG INJ IV PUSH: CPT

## 2023-08-19 PROCEDURE — 93005 ELECTROCARDIOGRAM TRACING: CPT | Performed by: EMERGENCY MEDICINE

## 2023-08-19 PROCEDURE — 84484 ASSAY OF TROPONIN QUANT: CPT

## 2023-08-19 PROCEDURE — 71045 X-RAY EXAM CHEST 1 VIEW: CPT

## 2023-08-19 PROCEDURE — 80053 COMPREHEN METABOLIC PANEL: CPT

## 2023-08-19 PROCEDURE — 83880 ASSAY OF NATRIURETIC PEPTIDE: CPT

## 2023-08-19 RX ORDER — METOPROLOL SUCCINATE 25 MG/1
25 TABLET, EXTENDED RELEASE ORAL DAILY
Qty: 30 TABLET | Refills: 3 | Status: SHIPPED | OUTPATIENT
Start: 2023-08-19

## 2023-08-19 RX ORDER — ADENOSINE 3 MG/ML
6 INJECTION, SOLUTION INTRAVENOUS ONCE
Status: COMPLETED | OUTPATIENT
Start: 2023-08-19 | End: 2023-08-19

## 2023-08-19 RX ADMIN — ADENOSINE 6 MG: 3 INJECTION, SOLUTION INTRAVENOUS at 18:49

## 2023-08-19 RX ADMIN — METOPROLOL TARTRATE 25 MG: 25 TABLET, FILM COATED ORAL at 20:51

## 2023-08-19 ASSESSMENT — ENCOUNTER SYMPTOMS
NAUSEA: 0
APNEA: 0
RHINORRHEA: 0
DIARRHEA: 0
CHEST TIGHTNESS: 1
VOICE CHANGE: 0
EYE DISCHARGE: 0
BLOOD IN STOOL: 0
PHOTOPHOBIA: 0
SHORTNESS OF BREATH: 0
SINUS PRESSURE: 0
ABDOMINAL DISTENTION: 0
SORE THROAT: 0
WHEEZING: 0
COUGH: 0
STRIDOR: 0
TROUBLE SWALLOWING: 0
EYE REDNESS: 0
VOMITING: 0
RECTAL PAIN: 0
EYE PAIN: 0
COLOR CHANGE: 0
CONSTIPATION: 0
ABDOMINAL PAIN: 0
BACK PAIN: 0

## 2023-08-19 ASSESSMENT — PAIN - FUNCTIONAL ASSESSMENT
PAIN_FUNCTIONAL_ASSESSMENT: 0-10
PAIN_FUNCTIONAL_ASSESSMENT: PREVENTS OR INTERFERES SOME ACTIVE ACTIVITIES AND ADLS

## 2023-08-19 ASSESSMENT — PAIN DESCRIPTION - FREQUENCY: FREQUENCY: CONTINUOUS

## 2023-08-19 ASSESSMENT — PAIN DESCRIPTION - DESCRIPTORS: DESCRIPTORS: PRESSURE

## 2023-08-19 ASSESSMENT — PAIN DESCRIPTION - LOCATION: LOCATION: CHEST

## 2023-08-19 ASSESSMENT — PAIN SCALES - GENERAL: PAINLEVEL_OUTOF10: 7

## 2023-08-19 ASSESSMENT — PAIN DESCRIPTION - PAIN TYPE: TYPE: ACUTE PAIN

## 2023-08-20 LAB
EKG ATRIAL RATE: 112 BPM
EKG ATRIAL RATE: 84 BPM
EKG DIAGNOSIS: NORMAL
EKG P AXIS: 29 DEGREES
EKG P AXIS: 49 DEGREES
EKG P-R INTERVAL: 148 MS
EKG P-R INTERVAL: 172 MS
EKG Q-T INTERVAL: 236 MS
EKG Q-T INTERVAL: 316 MS
EKG Q-T INTERVAL: 366 MS
EKG QRS DURATION: 68 MS
EKG QRS DURATION: 72 MS
EKG QRS DURATION: 84 MS
EKG QTC CALCULATION (BAZETT): 420 MS
EKG QTC CALCULATION (BAZETT): 431 MS
EKG QTC CALCULATION (BAZETT): 432 MS
EKG R AXIS: 50 DEGREES
EKG R AXIS: 61 DEGREES
EKG R AXIS: 66 DEGREES
EKG T AXIS: 3 DEGREES
EKG T AXIS: 34 DEGREES
EKG T AXIS: 43 DEGREES
EKG VENTRICULAR RATE: 112 BPM
EKG VENTRICULAR RATE: 191 BPM
EKG VENTRICULAR RATE: 84 BPM

## 2023-08-20 NOTE — ED PROVIDER NOTES
Emergency Department Attending Provider Note  Location: [address] [place] [address]  8/19/2023     Patient Identification  Deborah Root is a 47 y.o. female      Deborah Root was evaluated in the Emergency Department for palpitations. Found to be in SVT and treated with adenosine converted to sinus rhythm. Laboratory  Elevated troponin 23. She is receiving IV fluids. Patient's care signed out to me from previous rider at the beginning of my shift. . Although initial history and physical exam information was obtained by JUNAID/NPP/MD/DO (who also dictated a record of this visit), I personally saw the patient and performed a substantive portion of the visit including all aspects of the medical decision making. EKG Interpretation  Rhythm is normal sinus rhythm  Rate is 80  Axis is normal  No STEMI criteria  Qtc is 432      Patient seen and evaluated. Relevant records reviewed. Patient monitored for several hours emergency department and her tachycardia has resolved. She reports objectively feeling better. She is given Toprol here. Repeat troponin is slightly increased 38. She denies any chest pain or anginal sounding symptoms. Suspect the elevated troponin is secondary to her being in SVT for 2 to 3 hours. Cardiology was consulted by phone and agrees to see her outpatient. Feel she is okay for discharge at this time. I low concern for ACS or PE or any other primary process at this time    Clinical impression  Supraventricular tachycardia  Palpitations  Elevated troponin    I, Dr. Bret Orr, am the primary clinician of record. I personally saw the patient and independently provided 0 minutes of non-concurrent critical care out of the total shared critical care time provided. This chart was generated in part by using Dragon Dictation system and may contain errors related to that system including errors in grammar, punctuation, and spelling, as well as words and phrases that may be inappropriate.  If MVC

## 2023-08-21 PROCEDURE — 93010 ELECTROCARDIOGRAM REPORT: CPT | Performed by: INTERNAL MEDICINE

## 2023-10-04 ENCOUNTER — TELEPHONE (OUTPATIENT)
Dept: PRIMARY CARE CLINIC | Age: 53
End: 2023-10-04

## 2023-10-04 NOTE — TELEPHONE ENCOUNTER
Pt called states she was seen in the ER and they gave her medication for her heart, Pt said she is applying for a job as a  and the need a paperwork stating that the medications or condition won't affect the position. She was a former pt of Dr. Del Mack.    Fax-  [phone] is were it can be faxed to.

## 2023-10-04 NOTE — TELEPHONE ENCOUNTER
Pt is in need of a hospital follow up with either provider before any forms are able to be completed Per . Tired calling number on message that is not a active numbers.

## 2023-10-16 ENCOUNTER — OFFICE VISIT (OUTPATIENT)
Dept: PRIMARY CARE CLINIC | Age: 53
End: 2023-10-16
Payer: COMMERCIAL

## 2023-10-16 VITALS
WEIGHT: 217.2 LBS | SYSTOLIC BLOOD PRESSURE: 130 MMHG | DIASTOLIC BLOOD PRESSURE: 79 MMHG | HEART RATE: 72 BPM | BODY MASS INDEX: 34.09 KG/M2 | HEIGHT: 67 IN | TEMPERATURE: 98.2 F

## 2023-10-16 DIAGNOSIS — I47.10 SVT (SUPRAVENTRICULAR TACHYCARDIA): ICD-10-CM

## 2023-10-16 DIAGNOSIS — Z00.00 ROUTINE GENERAL MEDICAL EXAMINATION AT A HEALTH CARE FACILITY: Primary | ICD-10-CM

## 2023-10-16 DIAGNOSIS — Z12.11 SCREEN FOR COLON CANCER: ICD-10-CM

## 2023-10-16 DIAGNOSIS — Z23 NEED FOR IMMUNIZATION AGAINST INFLUENZA: ICD-10-CM

## 2023-10-16 DIAGNOSIS — E66.09 CLASS 1 OBESITY DUE TO EXCESS CALORIES WITH SERIOUS COMORBIDITY AND BODY MASS INDEX (BMI) OF 34.0 TO 34.9 IN ADULT: ICD-10-CM

## 2023-10-16 PROBLEM — R87.610 ATYPICAL SQUAMOUS CELL CHANGES OF UNDETERMINED SIGNIFICANCE (ASCUS) ON CERVICAL CYTOLOGY WITH POSITIVE HIGH RISK HUMAN PAPILLOMA VIRUS (HPV): Status: RESOLVED | Noted: 2021-11-23 | Resolved: 2023-10-16

## 2023-10-16 PROBLEM — D50.0 IRON DEFICIENCY ANEMIA DUE TO CHRONIC BLOOD LOSS: Status: RESOLVED | Noted: 2020-06-19 | Resolved: 2023-10-16

## 2023-10-16 PROBLEM — R87.810 ATYPICAL SQUAMOUS CELL CHANGES OF UNDETERMINED SIGNIFICANCE (ASCUS) ON CERVICAL CYTOLOGY WITH POSITIVE HIGH RISK HUMAN PAPILLOMA VIRUS (HPV): Status: RESOLVED | Noted: 2021-11-23 | Resolved: 2023-10-16

## 2023-10-16 PROBLEM — L98.9 SCALP LESION: Status: RESOLVED | Noted: 2023-04-07 | Resolved: 2023-10-16

## 2023-10-16 PROBLEM — E66.811 CLASS 1 OBESITY DUE TO EXCESS CALORIES WITH SERIOUS COMORBIDITY AND BODY MASS INDEX (BMI) OF 34.0 TO 34.9 IN ADULT: Status: ACTIVE | Noted: 2023-10-16

## 2023-10-16 PROCEDURE — G8484 FLU IMMUNIZE NO ADMIN: HCPCS | Performed by: STUDENT IN AN ORGANIZED HEALTH CARE EDUCATION/TRAINING PROGRAM

## 2023-10-16 PROCEDURE — 99396 PREV VISIT EST AGE 40-64: CPT | Performed by: STUDENT IN AN ORGANIZED HEALTH CARE EDUCATION/TRAINING PROGRAM

## 2023-10-16 RX ORDER — METOPROLOL SUCCINATE 50 MG/1
50 TABLET, EXTENDED RELEASE ORAL DAILY
Qty: 90 TABLET | Refills: 1 | Status: SHIPPED | OUTPATIENT
Start: 2023-10-16

## 2023-10-16 ASSESSMENT — ENCOUNTER SYMPTOMS
CHEST TIGHTNESS: 0
ABDOMINAL PAIN: 0
STRIDOR: 0
DIARRHEA: 0
SHORTNESS OF BREATH: 0
BACK PAIN: 0
CONSTIPATION: 0
BLOOD IN STOOL: 0

## 2023-10-26 LAB — NONINV COLON CA DNA+OCC BLD SCRN STL QL: NEGATIVE

## 2023-11-07 DIAGNOSIS — D50.0 IRON DEFICIENCY ANEMIA DUE TO CHRONIC BLOOD LOSS: ICD-10-CM

## 2023-11-07 NOTE — TELEPHONE ENCOUNTER
Medication:   Requested Prescriptions     Pending Prescriptions Disp Refills    ferrous gluconate (FERGON) 324 (38 Fe) MG tablet [Pharmacy Med Name: Ferrous Gluconate Oral Tablet 324 (38 Fe) MG] 180 tablet 0     Sig: TAKE 1 TABLET BY MOUTH TWO TIMES A DAY FOR ANEMIA        Last Filled:  12/20/22    Patient Phone Number: [phone] (home)     Last appt: 10/16/2023     Return in about 1 year (around 10/16/2024).   Next appt: Visit date not found    Last OARRS:        No data to display

## 2023-11-08 RX ORDER — FERROUS GLUCONATE 324(38)MG
TABLET ORAL
Qty: 180 TABLET | Refills: 0 | Status: SHIPPED | OUTPATIENT
Start: 2023-11-08

## 2023-11-16 ENCOUNTER — OFFICE VISIT (OUTPATIENT)
Dept: CARDIOLOGY CLINIC | Age: 53
End: 2023-11-16
Payer: COMMERCIAL

## 2023-11-16 VITALS
BODY MASS INDEX: 34.91 KG/M2 | WEIGHT: 217.2 LBS | SYSTOLIC BLOOD PRESSURE: 110 MMHG | DIASTOLIC BLOOD PRESSURE: 60 MMHG | HEIGHT: 66 IN | HEART RATE: 78 BPM

## 2023-11-16 DIAGNOSIS — I47.10 SVT (SUPRAVENTRICULAR TACHYCARDIA): Primary | ICD-10-CM

## 2023-11-16 PROCEDURE — G8427 DOCREV CUR MEDS BY ELIG CLIN: HCPCS | Performed by: INTERNAL MEDICINE

## 2023-11-16 PROCEDURE — G8417 CALC BMI ABV UP PARAM F/U: HCPCS | Performed by: INTERNAL MEDICINE

## 2023-11-16 PROCEDURE — 99204 OFFICE O/P NEW MOD 45 MIN: CPT | Performed by: INTERNAL MEDICINE

## 2023-11-16 PROCEDURE — G8484 FLU IMMUNIZE NO ADMIN: HCPCS | Performed by: INTERNAL MEDICINE

## 2023-11-16 PROCEDURE — 3017F COLORECTAL CA SCREEN DOC REV: CPT | Performed by: INTERNAL MEDICINE

## 2023-11-16 PROCEDURE — 93000 ELECTROCARDIOGRAM COMPLETE: CPT | Performed by: INTERNAL MEDICINE

## 2023-11-16 PROCEDURE — 1036F TOBACCO NON-USER: CPT | Performed by: INTERNAL MEDICINE

## 2023-11-16 NOTE — PATIENT INSTRUCTIONS
Electrophysiology Study (EPS) and Catheter Ablation    Date of Procedure:     Time of Arrival:     Cardiologist performing procedure: Dr. Rachel Fuentes at [facility] through the main entrance. Check in at the Outpatient Diagnostic desk on the 1st floor. Do not eat or drink anything after midnight the night before the procedure. You may brush your teeth and rinse the morning of the procedure. Please hold Metoprolol for 5 days prior to procedure. Do NOT stop taking aspirin, Plavix, Coumadin, Eliquis, Xarelto, or Pradaxa (any blood thinners) leading up to the procedure. However, do not take any blood thinners the morning of the procedure. Take all your other routine medications the morning of the procedure with the following exceptions: If you are taking diabetic medications, please HOLD on the day of the procedure (including insulin). If you take Lantus insulin, take HALF of your usual dose the night before. If you take a water pill, please HOLD on the day of the procedure. If you take a blood thinner, please HOLD on the day of the procedure. Hold any other medications as instructed above. Lab work is due within a week of the procedure. You do not need to be fasting for these labs. This can be done at the [address] lab at [facility]. [address] [place] [address]. Do not apply any lotion, powder, or deodorant the morning of the procedure. Please bring a list of your medications to the hospital with you. You must have someone available to drive you home. It is recommended that you do not drive for 48 hours after the procedure. You will need someone to stay with you at home the night of the procedure. If you are unable to make this appointment, please call [address], at [phone].

## 2023-11-16 NOTE — PROGRESS NOTES
does not currently use alcohol. She reports that she does not use drugs. Family History:  Reviewed. No family history of SCD. Physical Examination:  Vitals:    11/16/23 0842   BP: 110/60   Pulse: 78      Wt Readings from Last 3 Encounters:   11/16/23 98.5 kg (217 lb 3.2 oz)   10/16/23 98.5 kg (217 lb 3.2 oz)   08/19/23 100.2 kg (221 lb)     No acute distress  Moist oral mucosa, nonicteric conjunctiva  Chest clear, nonlabored, no rhonchi or wheeze  Heart is regular rate, regular rhythm, no murmurs, no lower extremity swelling, no jugular venous distention  Abdomen is rounded, soft, nontender  Strength is grossly preserved, normal gait, normal tone  No focal neurologic deficits  Appropriate mood and affect  No rashes or ecchymoses     Labs:      Imaging:  Echo 2014:   Summary    Normal left ventricle size, wall thickness and systolic function with an    estimated ejection fraction of 55-60%. No regional wall motion abnormalities    are seen. There is diastolic dysfunction present. RVSP estimated at 21 mmHg. 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       Assessment/Plan:    SVT  - Symptomatic with multiple episodes, at least 2-3 over the past year  - Recently treated in the emergency department successfully with adenosine  - Reviewed EKG from a 5/21/2024 that demonstrates all what I believed to be AVNRT, subtle R waves in V1   - We discussed management options including continued observation, abortive maneuvers, medical management with metoprolol (currently on), antiarrhythmic drugs and ablation, she prefers a definitive intervention without the need for long-term medication, she would like to proceed with ablation  - We will schedule for a EP study    EP study    NOTE: This report was transcribed using voice recognition software. Every effort was made to ensure accuracy, however, inadvertent computerized transcription errors may be present.      This note was scribed in the

## 2023-11-17 ENCOUNTER — TELEPHONE (OUTPATIENT)
Dept: CARDIOLOGY CLINIC | Age: 53
End: 2023-11-17

## 2023-11-17 DIAGNOSIS — I47.10 SVT (SUPRAVENTRICULAR TACHYCARDIA): Primary | ICD-10-CM

## 2023-11-17 NOTE — TELEPHONE ENCOUNTER
Please schedule pt for EP study and SVT ablation        Electrophysiology Study (EPS) and Catheter Ablation    Date of Procedure:     Time of Arrival:     Cardiologist performing procedure: Dr. Gutierrez    Arrive at [facility] through the main entrance.  Check in at the Outpatient Diagnostic desk on the 1st floor.    Do not eat or drink anything after midnight the night before the procedure. You may brush your teeth and rinse the morning of the procedure.    Please hold Metoprolol for 5 days prior to procedure.    Do NOT stop taking aspirin, Plavix, Coumadin, Eliquis, Xarelto, or Pradaxa (any blood thinners) leading up to the procedure.  However, do not take any blood thinners the morning of the procedure.    Take all your other routine medications the morning of the procedure with the following exceptions:  If you are taking diabetic medications, please HOLD on the day of the procedure (including insulin).  If you take Lantus insulin, take HALF of your usual dose the night before.  If you take a water pill, please HOLD on the day of the procedure.  If you take a blood thinner, please HOLD on the day of the procedure.  Hold any other medications as instructed above.    Lab work is due within a week of the procedure. You do not need to be fasting for these labs. This can be done at the [facility] Outpatient lab at [address]    Do not apply any lotion, powder, or deodorant the morning of the procedure.    Please bring a list of your medications to the hospital with you.    You must have someone available to drive you home.  It is recommended that you do not drive for 48 hours after the procedure.    You will need someone to stay with you at home the night of the procedure.    If you are unable to make this appointment, please call [facility], at [phone].

## 2023-11-20 NOTE — TELEPHONE ENCOUNTER
Procedure:  EPS/SVT Ablation  Doctor:  Dr. Gutierrez  Date:  12/20/23  Time:  8:30am  Arrival:  7am  Reps:  Carto  Anesthesia:  Yes      Spoke with patient. Please have patient arrive to the main entrance of [facility] ([address] [place], [place] [id number]) and check in with the registration desk.  They will be directed to the Cath Lab.  Remind patient to be NPO after midnight (8 hours prior). Do not apply lotions/creams on skin the day of procedure.

## 2023-12-06 DIAGNOSIS — F41.9 ANXIETY: ICD-10-CM

## 2023-12-06 DIAGNOSIS — F33.1 MODERATE EPISODE OF RECURRENT MAJOR DEPRESSIVE DISORDER (HCC): ICD-10-CM

## 2023-12-06 RX ORDER — VENLAFAXINE HYDROCHLORIDE 75 MG/1
CAPSULE, EXTENDED RELEASE ORAL
Qty: 90 CAPSULE | Refills: 0 | Status: SHIPPED | OUTPATIENT
Start: 2023-12-06

## 2023-12-06 NOTE — TELEPHONE ENCOUNTER
Medication:   Requested Prescriptions     Pending Prescriptions Disp Refills    venlafaxine (EFFEXOR XR) 75 MG extended release capsule [Pharmacy Med Name: Venlafaxine HCl ER Oral Capsule Extended Release 24 Hour 75 MG] 90 capsule 0     Sig: TAKE 1 CAPSULE BY MOUTH EVERY DAY        Last Filled:  07/17/23    Patient Phone Number: [phone] (home)     Last appt: 10/16/2023   Next appt: Visit date not found    Last OARRS:        No data to display

## 2023-12-07 ENCOUNTER — TELEPHONE (OUTPATIENT)
Dept: PRIMARY CARE CLINIC | Age: 53
End: 2023-12-07

## 2023-12-07 NOTE — TELEPHONE ENCOUNTER
Submitted PA for Venlafaxine HCl ER 75MG er capsules  Via CM Key: [id number] STATUS: PENDING. Follow up done daily; if no response in three days we will refax for status check. If another three days goes by with no response we will call the insurance for status.

## 2023-12-07 NOTE — TELEPHONE ENCOUNTER
Medication needs a Prior-Auth started    venlafaxine (EFFEXOR XR) 75 MG extended release capsule [[id number]]

## 2023-12-07 NOTE — TELEPHONE ENCOUNTER
venlafaxine (EFFEXOR XR) 75 MG extended release capsule [[id number]]    Order Details  Dose, Route, Frequency: As Directed   Dispense Quantity: 90 capsule Refills: 0          Sig: TAKE 1 CAPSULE BY MOUTH EVERY DAY         Start Date: 12/06/23 End Date: --   Written Date: 12/06/23 Expiration Date: 12/05/24       Associated Diagnoses: Anxiety [F41.9];  Moderate episode of recurrent major depressive disorder ([address]) [F33.1]   Original Order: venlafaxine (EFFEXOR XR) 75 MG extended release capsule [[id number]]   Providers    Authorizing Provider: Kelton Peñaloza DO NPI: [id number]   Ordering User: Kelton Peñaloza, [address] [place] [address] Drive    [facility] [id number] Polly Dowell [phone] - F [phone]  [facility] Bad[facility] [address]  Phone: [phone]  Fax: [phone]

## 2023-12-08 NOTE — TELEPHONE ENCOUNTER
Per Ann Coker: PA Not Required for Venlafaxine HCl ER 75MG er capsules; letter attached. If this requires a response please respond to the pool ( P [id number] [phone]). Thank you please advise patient.

## 2023-12-12 NOTE — TELEPHONE ENCOUNTER
Medication instructions given per Roxi Desai RN on 11/17/2023 per this encounter thread    \"Please hold Metoprolol for 5 days prior to procedure.\"    LVM for patient, when she returns call please make sure she is aware of instructions as listed. Thanks

## 2023-12-20 ENCOUNTER — HOSPITAL ENCOUNTER (OUTPATIENT)
Dept: CARDIAC CATH/INVASIVE PROCEDURES | Age: 53
Discharge: HOME | End: 2023-12-20
Attending: INTERNAL MEDICINE | Admitting: INTERNAL MEDICINE
Payer: COMMERCIAL

## 2023-12-20 VITALS
RESPIRATION RATE: 13 BRPM | HEART RATE: 100 BPM | OXYGEN SATURATION: 96 % | WEIGHT: 215 LBS | SYSTOLIC BLOOD PRESSURE: 124 MMHG | HEIGHT: 67 IN | DIASTOLIC BLOOD PRESSURE: 88 MMHG | BODY MASS INDEX: 33.74 KG/M2 | TEMPERATURE: 97.9 F

## 2023-12-20 LAB
ANION GAP SERPL CALCULATED.3IONS-SCNC: 9 MMOL/L (ref 3–16)
BUN SERPL-MCNC: 11 MG/DL (ref 7–20)
CALCIUM SERPL-MCNC: 8.9 MG/DL (ref 8.3–10.6)
CHLORIDE SERPL-SCNC: 101 MMOL/L (ref 99–110)
CO2 SERPL-SCNC: 26 MMOL/L (ref 21–32)
CREAT SERPL-MCNC: <0.5 MG/DL (ref 0.6–1.1)
DEPRECATED RDW RBC AUTO: 13.2 % (ref 12.4–15.4)
EKG ATRIAL RATE: 75 BPM
EKG DIAGNOSIS: NORMAL
EKG P AXIS: 38 DEGREES
EKG P-R INTERVAL: 164 MS
EKG Q-T INTERVAL: 388 MS
EKG QRS DURATION: 74 MS
EKG QTC CALCULATION (BAZETT): 433 MS
EKG R AXIS: 65 DEGREES
EKG T AXIS: 45 DEGREES
EKG VENTRICULAR RATE: 75 BPM
GFR SERPLBLD CREATININE-BSD FMLA CKD-EPI: >60 ML/MIN/{1.73_M2}
GLUCOSE SERPL-MCNC: 93 MG/DL (ref 70–99)
HCT VFR BLD AUTO: 45.8 % (ref 36–48)
HGB BLD-MCNC: 15.2 G/DL (ref 12–16)
MCH RBC QN AUTO: 30 PG (ref 26–34)
MCHC RBC AUTO-ENTMCNC: 33.3 G/DL (ref 31–36)
MCV RBC AUTO: 90.2 FL (ref 80–100)
PLATELET # BLD AUTO: 207 K/UL (ref 135–450)
PMV BLD AUTO: 8.8 FL (ref 5–10.5)
POTASSIUM SERPL-SCNC: 4.1 MMOL/L (ref 3.5–5.1)
RBC # BLD AUTO: 5.08 M/UL (ref 4–5.2)
SODIUM SERPL-SCNC: 136 MMOL/L (ref 136–145)
SPECIMEN STATUS: NORMAL
WBC # BLD AUTO: 8.6 K/UL (ref 4–11)

## 2023-12-20 PROCEDURE — 6360000002 HC RX W HCPCS

## 2023-12-20 PROCEDURE — 93653 COMPRE EP EVAL TX SVT: CPT | Performed by: INTERNAL MEDICINE

## 2023-12-20 PROCEDURE — C1733 CATH, EP, OTHR THAN COOL-TIP: HCPCS | Performed by: INTERNAL MEDICINE

## 2023-12-20 PROCEDURE — 93623 PRGRMD STIMJ&PACG IV RX NFS: CPT

## 2023-12-20 PROCEDURE — C1730 CATH, EP, 19 OR FEW ELECT: HCPCS | Performed by: INTERNAL MEDICINE

## 2023-12-20 PROCEDURE — C1732 CATH, EP, DIAG/ABL, 3D/VECT: HCPCS | Performed by: INTERNAL MEDICINE

## 2023-12-20 PROCEDURE — 7100000000 HC PACU RECOVERY - FIRST 15 MIN: Performed by: ANESTHESIOLOGY

## 2023-12-20 PROCEDURE — C1760 CLOSURE DEV, VASC: HCPCS | Performed by: INTERNAL MEDICINE

## 2023-12-20 PROCEDURE — 7100000001 HC PACU RECOVERY - ADDTL 15 MIN: Performed by: ANESTHESIOLOGY

## 2023-12-20 PROCEDURE — 3700000001 HC ADD 15 MINUTES (ANESTHESIA): Performed by: ANESTHESIOLOGY

## 2023-12-20 PROCEDURE — 80048 BASIC METABOLIC PNL TOTAL CA: CPT

## 2023-12-20 PROCEDURE — 93623 PRGRMD STIMJ&PACG IV RX NFS: CPT | Performed by: INTERNAL MEDICINE

## 2023-12-20 PROCEDURE — 2500000003 HC RX 250 WO HCPCS

## 2023-12-20 PROCEDURE — C1893 INTRO/SHEATH, FIXED,NON-PEEL: HCPCS | Performed by: INTERNAL MEDICINE

## 2023-12-20 PROCEDURE — 93653 COMPRE EP EVAL TX SVT: CPT

## 2023-12-20 PROCEDURE — 85027 COMPLETE CBC AUTOMATED: CPT

## 2023-12-20 PROCEDURE — 3700000000 HC ANESTHESIA ATTENDED CARE: Performed by: ANESTHESIOLOGY

## 2023-12-20 PROCEDURE — 93005 ELECTROCARDIOGRAM TRACING: CPT

## 2023-12-20 PROCEDURE — 2709999900 HC NON-CHARGEABLE SUPPLY: Performed by: INTERNAL MEDICINE

## 2023-12-20 PROCEDURE — C1894 INTRO/SHEATH, NON-LASER: HCPCS | Performed by: INTERNAL MEDICINE

## 2023-12-20 RX ORDER — SODIUM CHLORIDE 0.9 % (FLUSH) 0.9 %
5-40 SYRINGE (ML) INJECTION EVERY 12 HOURS SCHEDULED
Status: DISCONTINUED | OUTPATIENT
Start: 2023-12-20 | End: 2023-12-20 | Stop reason: HOSPADM

## 2023-12-20 RX ORDER — SODIUM CHLORIDE 9 MG/ML
INJECTION, SOLUTION INTRAVENOUS PRN
Status: DISCONTINUED | OUTPATIENT
Start: 2023-12-20 | End: 2023-12-20 | Stop reason: HOSPADM

## 2023-12-20 RX ORDER — IPRATROPIUM BROMIDE AND ALBUTEROL SULFATE 2.5; .5 MG/3ML; MG/3ML
1 SOLUTION RESPIRATORY (INHALATION)
Status: DISCONTINUED | OUTPATIENT
Start: 2023-12-20 | End: 2023-12-20 | Stop reason: HOSPADM

## 2023-12-20 RX ORDER — OXYCODONE HYDROCHLORIDE 5 MG/1
10 TABLET ORAL PRN
Status: DISCONTINUED | OUTPATIENT
Start: 2023-12-20 | End: 2023-12-20 | Stop reason: HOSPADM

## 2023-12-20 RX ORDER — SODIUM CHLORIDE 0.9 % (FLUSH) 0.9 %
5-40 SYRINGE (ML) INJECTION PRN
Status: DISCONTINUED | OUTPATIENT
Start: 2023-12-20 | End: 2023-12-20 | Stop reason: HOSPADM

## 2023-12-20 RX ORDER — ONDANSETRON 2 MG/ML
4 INJECTION INTRAMUSCULAR; INTRAVENOUS
Status: DISCONTINUED | OUTPATIENT
Start: 2023-12-20 | End: 2023-12-20 | Stop reason: HOSPADM

## 2023-12-20 RX ORDER — MEPERIDINE HYDROCHLORIDE 50 MG/ML
12.5 INJECTION INTRAMUSCULAR; INTRAVENOUS; SUBCUTANEOUS EVERY 5 MIN PRN
Status: DISCONTINUED | OUTPATIENT
Start: 2023-12-20 | End: 2023-12-20 | Stop reason: HOSPADM

## 2023-12-20 RX ORDER — LORAZEPAM 0.5 MG/1
0.5 TABLET ORAL
Status: DISCONTINUED | OUTPATIENT
Start: 2023-12-20 | End: 2023-12-20 | Stop reason: HOSPADM

## 2023-12-20 RX ORDER — SODIUM CHLORIDE 9 MG/ML
INJECTION, SOLUTION INTRAVENOUS PRN
Status: DISCONTINUED | OUTPATIENT
Start: 2023-12-20 | End: 2023-12-20

## 2023-12-20 RX ORDER — HYDROMORPHONE HYDROCHLORIDE 1 MG/ML
0.25 INJECTION, SOLUTION INTRAMUSCULAR; INTRAVENOUS; SUBCUTANEOUS EVERY 5 MIN PRN
Status: DISCONTINUED | OUTPATIENT
Start: 2023-12-20 | End: 2023-12-20 | Stop reason: HOSPADM

## 2023-12-20 RX ORDER — PROCHLORPERAZINE EDISYLATE 5 MG/ML
5 INJECTION INTRAMUSCULAR; INTRAVENOUS
Status: DISCONTINUED | OUTPATIENT
Start: 2023-12-20 | End: 2023-12-20 | Stop reason: HOSPADM

## 2023-12-20 RX ORDER — ONDANSETRON 2 MG/ML
4 INJECTION INTRAMUSCULAR; INTRAVENOUS EVERY 6 HOURS PRN
Status: DISCONTINUED | OUTPATIENT
Start: 2023-12-20 | End: 2023-12-20 | Stop reason: HOSPADM

## 2023-12-20 RX ORDER — OXYCODONE HYDROCHLORIDE 5 MG/1
5 TABLET ORAL
Status: DISCONTINUED | OUTPATIENT
Start: 2023-12-20 | End: 2023-12-20 | Stop reason: HOSPADM

## 2023-12-20 RX ORDER — ACETAMINOPHEN 325 MG/1
650 TABLET ORAL EVERY 4 HOURS PRN
Status: DISCONTINUED | OUTPATIENT
Start: 2023-12-20 | End: 2023-12-20 | Stop reason: HOSPADM

## 2023-12-20 RX ORDER — HYDROMORPHONE HYDROCHLORIDE 1 MG/ML
0.5 INJECTION, SOLUTION INTRAMUSCULAR; INTRAVENOUS; SUBCUTANEOUS EVERY 5 MIN PRN
Status: DISCONTINUED | OUTPATIENT
Start: 2023-12-20 | End: 2023-12-20 | Stop reason: HOSPADM

## 2023-12-20 NOTE — PROCEDURES
Ablation    Then three-dimensional mapping system (Carto navigation system)  was used and a 3D electroanatomical map of the right atrium including His bundle and CS location was created. The octapolar catheter was removed and the 8 Mongolian sheath exchanged for an SRO sheath. Through this a 4mm contact force, irrigated, ablation catheter was advanced into position along the septal aspect of the tricuspid valve under fluoroscopic and 3D mapping guidance. Electrophysiologic mapping was performed to localize an area on the anterior lip of the coronary sinus ostium. An atrial-ventricular ratio of approximately 1:3 was initially sought. Radiofrequency lesions (starting with 20W and increasing to 30W up to 60s) were delivered. If no junctional rhythm was seen after 10 seconds of ablation then ablation was stopped. Anatomy was notable for a short distance between the valve and coronary sinus os. Junctional beats were obtained with ablation approximately 14 mm inferior to the HIS cloud. Isuprel was restarted and titrated tup to ( 5 mcg/min)    Incremental atrial pacing and atrial extrastimuli were performed with reinduction (using doubles) of a nonsustained run of AVNRT. Echo beats and crossing over phenomenon were also seen. Additional radiofrequency ablation was performed at this time slightly superior to the last lesion and approximately 10 mm below the inferior aspect of the HIS cloud. Brief junctional rhythm was seen with ablation for a single lesion. The remainder of ablation lesions did not result in junctional rhythm. Isuprel was restarted using settings above. Incremental atrial pacing and atrial extrastimuli was again performed. There was incomplete crossing over before reaching AV Wenckebach cycle length, a single echo beat was seen over a small 10ms excitable gap (improved from 40-50ms excitable gap following initial lesion set) and no arrhythmia was induced.     Given that no arrhythmia was [phone]  Fax: [phone]

## 2023-12-20 NOTE — PROCEDURES
[facility]     Electrophysiology Procedure Note       Date of Procedure: 12/20/2023  Patient's Name: Veronica Barber  YOB: 1970   Medical Record Number: [id number]  Procedure Performed by: Deepthi Carnes MD    Procedure performed:    Comprehensive electrophysiological study with ablation of SVT, AV Vineet re-entrant tachycardia  Attempted induction of arrhythmia after IV drug infusion. Three-dimensional electroanatomic mapping of the right atrium  Left atrial recording and mapping via coronary sinus   Sedation provided by anesthesia    Indications for procedure:   Symptomatic SVT    Joanne Cm 47 y.o. female with history of recurrent symptomatic SVT, prompting visit to the emergency department, adenosine sensitive, EKG was reviewed with pseudo-R wave in V1 suggestive of AVNRT. Management options including observation, medication and ablation were discussed in clinic. Patient elected to proceed with ablation for definitive management of recurrent symptomatic arrhythmia. Details of Procedure: The risks, benefits and alternatives of the ablation procedure were discussed with the patient. The risks including, but not limited to, the risks of bleeding, infection, radiation exposure, injury to vascular, cardiac and surrounding structures (including pneumothorax), stroke, cardiac perforation, tamponade, need for emergent open heart surgery, need for pacemaker implantation, myocardial infarction and death were discussed in detail. The patient opted to proceed with the ablation. Written informed consent was signed and placed in the chart. The patient was brought to the electrophysiology lab in a fasting nonsedated state. Anesthesia team provided sedation. Ultrasound was used for femoral venous access. Using a modified Seldinger technique a micropuncture needle, under ultrasound guidance, was used to access the femoral vein.   A micropuncture wire was introduced into the vein and

## 2023-12-20 NOTE — PROGRESS NOTES
Pt brought to PACU. Report obtained from cath lab RN and anesthesia.  Pt placed on monitor and O2 at  3L

## 2023-12-20 NOTE — DISCHARGE INSTRUCTIONS
Cath Labs at  [facility]   Electrophysiology Study and/or Ablation discharge instructions   12/20/2023  Harrison Valerio   Date of Birth 1970       Activity   Rest for one to two days after your procedure. If the catheter was put in your groin, avoid using stairs for a few days. When you must use stairs, step up with the leg that was not used for the ablation/ EP Study. Straighten this leg to move the other leg up to the next step without putting stress on it. Bathing and catheter site care  Keep the catheter site clean and dry for the next 24 hours. You may shower after 24 hours  Wait one week before taking a tub bath, swimming or soaking in water. If the site begins to bleed have someone hold pressure. If the bleeding does not stop go to the nearest hospital or clinic. Do not walk and do not drive yourself. If you are bleeding dial 911. Diet   You may eat your usual diet. Contact a Caregiver if:   You have a fever  The catheter site become red or has drainage. You have increasing pain at the catheter site. (it is normal to have some soreness, but this should get better, not worse)  You have questions or concerns about your procedure, medicines or megan condition. If you recieved    Seek Care Immediately if:   If you have any of the following it is an emergency- Call 911  Your leg or arm becomes numb, painful or changes color   The bruise at the catheter site starts to get bigger or the area has new swelling. The catheter site is bleeding   You have signs of a stroke. Having new weakness or trouble moving one side of your face or body may be signs of a stroke. Other signs include new trouble thinking or speaking clearly, and new changes in vision          Sedation Discharge Instructions: For the next 24 hours do not drive a car, operate machinery, power tools or kitchen appliances. Do not drink alcohol; including beer or wine.   Do not make any important decisions or sign any important papers. For the next 24 hours you can expect drowsiness, light-headed or dizziness, nausea/ vomiting, inability to concentrate, fatigue and desire to sleep. We strongly suggest that a responsible adult be with for the next 24 hours, for your protection and safety. You are not allowed to drive yourself home, or take any type of public transportation. If any questions, please call your doctor. If you cannot reach your Doctor then call the Emergency Department at  [phone]. Explain to the Emergency Department the procedure you had performed and they will be able to assist you. If you seek Emergency Care, bring this form with you.

## 2024-01-16 ASSESSMENT — PATIENT HEALTH QUESTIONNAIRE - PHQ9
SUM OF ALL RESPONSES TO PHQ QUESTIONS 1-9: 0
2. FEELING DOWN, DEPRESSED OR HOPELESS: NOT AT ALL
SUM OF ALL RESPONSES TO PHQ QUESTIONS 1-9: 0
SUM OF ALL RESPONSES TO PHQ QUESTIONS 1-9: 0
7. TROUBLE CONCENTRATING ON THINGS, SUCH AS READING THE NEWSPAPER OR WATCHING TELEVISION: NOT AT ALL
SUM OF ALL RESPONSES TO PHQ QUESTIONS 1-9: 0
3. TROUBLE FALLING OR STAYING ASLEEP: 0
10. IF YOU CHECKED OFF ANY PROBLEMS, HOW DIFFICULT HAVE THESE PROBLEMS MADE IT FOR YOU TO DO YOUR WORK, TAKE CARE OF THINGS AT HOME, OR GET ALONG WITH OTHER PEOPLE: NOT DIFFICULT AT ALL
3. TROUBLE FALLING OR STAYING ASLEEP: NOT AT ALL
9. THOUGHTS THAT YOU WOULD BE BETTER OFF DEAD, OR OF HURTING YOURSELF: NOT AT ALL
4. FEELING TIRED OR HAVING LITTLE ENERGY: NOT AT ALL
5. POOR APPETITE OR OVEREATING: NOT AT ALL
1. LITTLE INTEREST OR PLEASURE IN DOING THINGS: 0
8. MOVING OR SPEAKING SO SLOWLY THAT OTHER PEOPLE COULD HAVE NOTICED. OR THE OPPOSITE - BEING SO FIDGETY OR RESTLESS THAT YOU HAVE BEEN MOVING AROUND A LOT MORE THAN USUAL: NOT AT ALL
SUM OF ALL RESPONSES TO PHQ9 QUESTIONS 1 & 2: 0
6. FEELING BAD ABOUT YOURSELF - OR THAT YOU ARE A FAILURE OR HAVE LET YOURSELF OR YOUR FAMILY DOWN: NOT AT ALL
2. FEELING DOWN, DEPRESSED OR HOPELESS: 0
10. IF YOU CHECKED OFF ANY PROBLEMS, HOW DIFFICULT HAVE THESE PROBLEMS MADE IT FOR YOU TO DO YOUR WORK, TAKE CARE OF THINGS AT HOME, OR GET ALONG WITH OTHER PEOPLE: 0
7. TROUBLE CONCENTRATING ON THINGS, SUCH AS READING THE NEWSPAPER OR WATCHING TELEVISION: 0
8. MOVING OR SPEAKING SO SLOWLY THAT OTHER PEOPLE COULD HAVE NOTICED. OR THE OPPOSITE, BEING SO FIGETY OR RESTLESS THAT YOU HAVE BEEN MOVING AROUND A LOT MORE THAN USUAL: 0
6. FEELING BAD ABOUT YOURSELF - OR THAT YOU ARE A FAILURE OR HAVE LET YOURSELF OR YOUR FAMILY DOWN: 0
4. FEELING TIRED OR HAVING LITTLE ENERGY: 0
1. LITTLE INTEREST OR PLEASURE IN DOING THINGS: NOT AT ALL
5. POOR APPETITE OR OVEREATING: 0
SUM OF ALL RESPONSES TO PHQ QUESTIONS 1-9: 0
9. THOUGHTS THAT YOU WOULD BE BETTER OFF DEAD, OR OF HURTING YOURSELF: 0

## 2024-01-17 ENCOUNTER — OFFICE VISIT (OUTPATIENT)
Dept: PRIMARY CARE CLINIC | Age: 54
End: 2024-01-17
Payer: COMMERCIAL

## 2024-01-17 VITALS
SYSTOLIC BLOOD PRESSURE: 130 MMHG | HEART RATE: 83 BPM | WEIGHT: 216.8 LBS | TEMPERATURE: 97 F | BODY MASS INDEX: 33.96 KG/M2 | DIASTOLIC BLOOD PRESSURE: 70 MMHG

## 2024-01-17 DIAGNOSIS — J18.9 COMMUNITY ACQUIRED PNEUMONIA OF LEFT LOWER LOBE OF LUNG: Primary | ICD-10-CM

## 2024-01-17 PROCEDURE — G8417 CALC BMI ABV UP PARAM F/U: HCPCS | Performed by: STUDENT IN AN ORGANIZED HEALTH CARE EDUCATION/TRAINING PROGRAM

## 2024-01-17 PROCEDURE — G8427 DOCREV CUR MEDS BY ELIG CLIN: HCPCS | Performed by: STUDENT IN AN ORGANIZED HEALTH CARE EDUCATION/TRAINING PROGRAM

## 2024-01-17 PROCEDURE — 3017F COLORECTAL CA SCREEN DOC REV: CPT | Performed by: STUDENT IN AN ORGANIZED HEALTH CARE EDUCATION/TRAINING PROGRAM

## 2024-01-17 PROCEDURE — 99213 OFFICE O/P EST LOW 20 MIN: CPT | Performed by: STUDENT IN AN ORGANIZED HEALTH CARE EDUCATION/TRAINING PROGRAM

## 2024-01-17 PROCEDURE — 1036F TOBACCO NON-USER: CPT | Performed by: STUDENT IN AN ORGANIZED HEALTH CARE EDUCATION/TRAINING PROGRAM

## 2024-01-17 PROCEDURE — G8484 FLU IMMUNIZE NO ADMIN: HCPCS | Performed by: STUDENT IN AN ORGANIZED HEALTH CARE EDUCATION/TRAINING PROGRAM

## 2024-01-17 RX ORDER — CIPROFLOXACIN 500 MG/1
TABLET, FILM COATED ORAL
COMMUNITY
Start: 2024-01-14

## 2024-01-17 RX ORDER — PREDNISONE 20 MG/1
TABLET ORAL
COMMUNITY
Start: 2024-01-14

## 2024-01-17 RX ORDER — IBUPROFEN 800 MG/1
TABLET ORAL
COMMUNITY
Start: 2024-01-14

## 2024-01-17 RX ORDER — BENZONATATE 100 MG/1
100-200 CAPSULE ORAL 3 TIMES DAILY PRN
Qty: 60 CAPSULE | Refills: 0 | Status: SHIPPED | OUTPATIENT
Start: 2024-01-17 | End: 2024-01-24

## 2024-01-17 ASSESSMENT — ENCOUNTER SYMPTOMS
RHINORRHEA: 0
CHEST TIGHTNESS: 1
SINUS PAIN: 0
COUGH: 1
WHEEZING: 0
SHORTNESS OF BREATH: 0
SINUS PRESSURE: 0
SORE THROAT: 1

## 2024-01-17 NOTE — PROGRESS NOTES
2024     Joanne Cm (:  1970) is a 53 y.o. female, here for evaluation of the following medical concerns:    HPI  Pneumonia  Joanne per today for evaluation of pneumonia.  Patient states that about a week ago she developed fevers, chills, chest pain, shortness of breath and productive cough.  She presented to the urgent care earlier this week where the doctor there heard something in the left lower lung.  She was diagnosed with pneumonia and started on Cipro.  She is taken 5 doses so far and is feeling much better.  She continues to cough, but the sputum is getting less.  She has not had fevers, chills, nausea, vomiting or diarrhea since her dose of antibiotics yesterday morning.    Review of Systems   Constitutional:  Negative for activity change, chills and fever.   HENT:  Positive for sore throat. Negative for congestion, ear pain, rhinorrhea, sinus pressure and sinus pain.    Respiratory:  Positive for cough and chest tightness. Negative for shortness of breath and wheezing.    Cardiovascular:  Negative for chest pain.   Neurological:  Negative for dizziness.       Prior to Visit Medications    Medication Sig Taking? Authorizing Provider   predniSONE (DELTASONE) 20 MG tablet TAKE 1 TABLET BY MOUTH 2 TIMES A DAY FOR 5 DAYS Yes ProviderRomeo MD   ciprofloxacin (CIPRO) 500 MG tablet TAKE 1 TABLET BY MOUTH 2 TIMES A DAY FOR 7 DAYS Yes ProviderRomeo MD   ibuprofen (ADVIL;MOTRIN) 800 MG tablet TAKE 1 TABLET BY MOUTH 4 TIMES A DAY AS NEEDED for pain FOR up to 7 DAYS Yes ProviderRomeo MD   benzonatate (TESSALON) 100 MG capsule Take 1-2 capsules by mouth 3 times daily as needed for Cough Yes Shiraz Randall DO   venlafaxine (EFFEXOR XR) 75 MG extended release capsule TAKE 1 CAPSULE BY MOUTH EVERY DAY Yes Shiraz Randall DO   ferrous gluconate (FERGON) 324 (38 Fe) MG tablet TAKE 1 TABLET BY MOUTH TWO TIMES A DAY FOR ANEMIA Yes Shiraz Randall DO   metoprolol succinate (TOPROL XL) 50 MG

## 2024-02-09 ENCOUNTER — OFFICE VISIT (OUTPATIENT)
Dept: BARIATRICS/WEIGHT MGMT | Age: 54
End: 2024-02-09

## 2024-02-09 VITALS
SYSTOLIC BLOOD PRESSURE: 131 MMHG | HEIGHT: 65 IN | DIASTOLIC BLOOD PRESSURE: 88 MMHG | BODY MASS INDEX: 35.99 KG/M2 | HEART RATE: 93 BPM | RESPIRATION RATE: 16 BRPM | OXYGEN SATURATION: 96 % | WEIGHT: 216 LBS

## 2024-02-09 DIAGNOSIS — E66.9 OBESITY (BMI 30-39.9): Primary | ICD-10-CM

## 2024-02-09 NOTE — PROGRESS NOTES
Joanne Cm is a 53 y.o. female with a date of birth of 1970.    Vitals:    02/09/24 1237   BP: 131/88   Site: Left Upper Arm   Position: Sitting   Pulse: 93   Resp: 16   SpO2: 96%   Weight: 98 kg (216 lb)   Height: 1.651 m (5' 5\")    BMI: Body mass index is 35.94 kg/m². Obesity Classification: Class II    Weight History:   Wt Readings from Last 3 Encounters:   02/09/24 98 kg (216 lb)   01/17/24 98.3 kg (216 lb 12.8 oz)   12/20/23 97.5 kg (215 lb)       Pt attended Medical Weight Management Seminar. Patient was educated on low-carb diet protocol. Nutrition and habit guidelines were discussed and written information was provided. Bariatric Nutrition Questionnaire completed during class and scanned into media.       Goals  Weight: 175 lbs.   Health Improvement: to be more physically active and interact with grand kids    Assessment  Nutritional Needs:RMR=(9.99 x 98 kg) + (6.25 x 165 cm) - (4.92 x 53 y.o.) -161  = 1593 kcal x 1.3 (sedentary activity factor)= 2071 kcal - 1000 (for 2 lb weight loss/week)= 1071 kcal.    Plan  Plan/Recommendations: Start 1200 Kcal LCMP  Optifast:  Pt not interested in   Diet Medications:  Pt interested in     Pt states that she may be interested in bariatric surgery.      PES Statement: Overweight/Obesity related to lack of exercise, sedentary lifestyle, unhealthy eating habits, and unsuccessful diet attempts as evidenced by BMI. Body mass index is 35.94 kg/m².    Will follow up as necessary.    Roxi Santiago, RD, LD

## 2024-02-14 ENCOUNTER — TELEPHONE (OUTPATIENT)
Dept: CARDIOLOGY CLINIC | Age: 54
End: 2024-02-14

## 2024-02-14 NOTE — TELEPHONE ENCOUNTER
02/14 pt contacted office requesting a note clearing her to return to work. Pt states the note needs to state she was off from surgery date to return date. Would like this to be sent to her email [email or web address]

## 2024-02-15 NOTE — TELEPHONE ENCOUNTER
Hima Gutierrez MD   to Me       2/15/24  1:06 PM  This has been two months since ablation. I will write for 1-2 weeks but not for 2 months. Do you know why she is asking for 2 months off?    Letter as follows    Patient, Joanne Cm, was under the care and supervision of Dr Hima Gutierrez at [facility] where she underwent minimally invasive procedure on 12/20/2023 for management of a medical condition. This may require her to abstain from work for 1 week or alternatively light duty (lifting weights no more than 5 lbs) could be considered in this time frame.      Brenda        Spoke with pt, informed pt due to procedure happening 2 months ago, CMV would not write a work restriction for two months, however he would write a week return letter. Pt vu and stated she would like the one week letter sent to her email.

## 2024-02-21 DIAGNOSIS — F41.9 ANXIETY: ICD-10-CM

## 2024-02-21 DIAGNOSIS — F33.1 MODERATE EPISODE OF RECURRENT MAJOR DEPRESSIVE DISORDER (HCC): ICD-10-CM

## 2024-02-22 RX ORDER — VENLAFAXINE HYDROCHLORIDE 75 MG/1
CAPSULE, EXTENDED RELEASE ORAL
Qty: 90 CAPSULE | Refills: 0 | Status: SHIPPED | OUTPATIENT
Start: 2024-02-22

## 2024-02-22 NOTE — TELEPHONE ENCOUNTER
Medication:   Requested Prescriptions     Pending Prescriptions Disp Refills    venlafaxine (EFFEXOR XR) 75 MG extended release capsule [Pharmacy Med Name: Venlafaxine HCl ER Oral Capsule Extended Release 24 Hour 75 MG] 90 capsule 0     Sig: TAKE 1 CAPSULE BY MOUTH EVERY DAY        Last Filled:  12/6/23    Patient Phone Number: [phone] (home) [phone] (work)    Last appt: 1/17/2024  Return if symptoms worsen or fail to improve.  Next appt: Visit date not found    Last OARRS:        No data to display

## 2024-03-04 ENCOUNTER — TELEMEDICINE (OUTPATIENT)
Dept: BARIATRICS/WEIGHT MGMT | Age: 54
End: 2024-03-04
Payer: COMMERCIAL

## 2024-03-04 DIAGNOSIS — Z71.3 DIETARY COUNSELING AND SURVEILLANCE: ICD-10-CM

## 2024-03-04 DIAGNOSIS — E66.9 CLASS 2 OBESITY: Primary | ICD-10-CM

## 2024-03-04 PROCEDURE — G8427 DOCREV CUR MEDS BY ELIG CLIN: HCPCS | Performed by: FAMILY MEDICINE

## 2024-03-04 PROCEDURE — 3017F COLORECTAL CA SCREEN DOC REV: CPT | Performed by: FAMILY MEDICINE

## 2024-03-04 PROCEDURE — 99204 OFFICE O/P NEW MOD 45 MIN: CPT | Performed by: FAMILY MEDICINE

## 2024-03-04 NOTE — PROGRESS NOTES
Patient: Joanne Cm     Encounter Date: 3/4/2024    YOB: 1970               Age: 53 y.o.        Patient identification was verified at the start of the visit.         2/9/2024     9:21 AM   Patient-Reported Vitals   Patient-Reported Weight 211   Patient-Reported Height 5 “7”   Patient-Reported Systolic 124 mmHg   Patient-Reported Diastolic 88 mmHg   Patient-Reported Pulse Na   Patient-Reported Temperature 97,8   Patient-Reported SpO2 Na   Patient-Reported Peak Flow Na         BP Readings from Last 1 Encounters:   03/28/24 126/76       BMI Readings from Last 1 Encounters:   03/28/24 37.41 kg/m²       Pulse Readings from Last 1 Encounters:   03/28/24 83                                             Wt Readings from Last 3 Encounters:   03/28/24 102 kg (224 lb 12.8 oz)   02/19/24 100 kg (220 lb 6.4 oz)   02/09/24 98 kg (216 lb)        Chief Complaint   Patient presents with    Bariatric, Initial Visit     MWM- NP        HPI:    53 y.o. female presents to establish care via video visit. She was referred by Dr. Shiraz Randall for weight management. The patient's medical history is significant for class II obesity. The patient has a long-standing history of obesity which started gradually. The problem is moderate.  The patient has been gaining weight.  Risk factors include annual weight gain of >2 lbs (1 kg)/ year and sedentary lifestyle. Aggravating factors include poor diet and lack of physical activity. The patient has tried various diet/exercise plans which have been ineffective in the long-run. she is motivated to start losing weight to help improve her overall health.     When did you become overweight?  [] Childhood   [] Teens   [x] Adulthood   [] Pregnancy   [] Menopause    Highest adult weight: Current     Triggers for weight gain?   [] Stress   [] Illness   [] Medications   [] Travel  []Injury     [] Nightshift work   [] Insomnia  [x] No specific triggers   [] Other    Food triggers:   [x] Stress

## 2024-03-27 ENCOUNTER — TELEPHONE (OUTPATIENT)
Dept: BARIATRICS/WEIGHT MGMT | Age: 54
End: 2024-03-27

## 2024-03-27 NOTE — TELEPHONE ENCOUNTER
Called as a new pt courtesy call - left message.  Told patient to have new pt paperwork completely filled out, insurance card, and id and to arrive on time to appointment.  If they didn't have the paperwork filled out and arrive on time may be rescheduled.  Also stated if they didn't receive paperwork to let us know so we could get it to them another way.  Left office number on message.

## 2024-03-28 ENCOUNTER — TELEPHONE (OUTPATIENT)
Dept: PRIMARY CARE CLINIC | Age: 54
End: 2024-03-28

## 2024-03-28 ENCOUNTER — OFFICE VISIT (OUTPATIENT)
Dept: BARIATRICS/WEIGHT MGMT | Age: 54
End: 2024-03-28
Payer: COMMERCIAL

## 2024-03-28 VITALS
RESPIRATION RATE: 18 BRPM | SYSTOLIC BLOOD PRESSURE: 126 MMHG | HEART RATE: 83 BPM | HEIGHT: 65 IN | WEIGHT: 224.8 LBS | DIASTOLIC BLOOD PRESSURE: 76 MMHG | OXYGEN SATURATION: 100 % | BODY MASS INDEX: 37.45 KG/M2

## 2024-03-28 DIAGNOSIS — E78.00 ELEVATED LOW DENSITY LIPOPROTEIN (LDL) CHOLESTEROL LEVEL: ICD-10-CM

## 2024-03-28 DIAGNOSIS — K21.9 CHRONIC GERD: ICD-10-CM

## 2024-03-28 DIAGNOSIS — Z01.818 PREOPERATIVE CLEARANCE: Primary | ICD-10-CM

## 2024-03-28 DIAGNOSIS — E66.01 SEVERE OBESITY (BMI 35.0-39.9) WITH COMORBIDITY (HCC): ICD-10-CM

## 2024-03-28 PROCEDURE — 99215 OFFICE O/P EST HI 40 MIN: CPT | Performed by: SURGERY

## 2024-03-28 PROCEDURE — 1036F TOBACCO NON-USER: CPT | Performed by: SURGERY

## 2024-03-28 PROCEDURE — G8427 DOCREV CUR MEDS BY ELIG CLIN: HCPCS | Performed by: SURGERY

## 2024-03-28 PROCEDURE — G8484 FLU IMMUNIZE NO ADMIN: HCPCS | Performed by: SURGERY

## 2024-03-28 PROCEDURE — 3017F COLORECTAL CA SCREEN DOC REV: CPT | Performed by: SURGERY

## 2024-03-28 PROCEDURE — G8417 CALC BMI ABV UP PARAM F/U: HCPCS | Performed by: SURGERY

## 2024-03-28 RX ORDER — ONDANSETRON 4 MG/1
4 TABLET, FILM COATED ORAL DAILY PRN
Qty: 30 TABLET | Refills: 0 | Status: SHIPPED | OUTPATIENT
Start: 2024-03-28

## 2024-03-28 NOTE — PROGRESS NOTES
Joanne Cm is a 53 y.o. female with a date of birth of 1970.    Vitals:    03/28/24 0907   BP: 126/76   Pulse: 83   Resp: 18   SpO2: 100%    BMI: Body mass index is 37.41 kg/m². Obesity Classification: Class II    Weight History:   Wt Readings from Last 3 Encounters:   03/28/24 102 kg (224 lb 12.8 oz)   02/19/24 100 kg (220 lb 6.4 oz)   02/09/24 98 kg (216 lb)     Patient's lowest adult weight was 175 lbs at age 26.     Patient's highest adult weight was 218 lbs at age 53.     Patient has participated in the following weight loss programs: low carb, calorie restriction.   Patient has not participated in meal replacement/liquid diets.  Patient has participated in weight loss medications. Mounjaro     Patient is not lactose intolerant. Patient does not have food allergies. Patient does not have Alevism/cultural food preferences. Patient does tolerate artificial sweeteners.     24 hour recall/food frequency chart:  Breakfast: None - will over eat at lunch  Snack: None  Lunch: [place] + chips   Snack: None  Dinner: Meat + veg + starch + bread  Snack: None avoids eating after 8 pm d/t causing nightmares   Generally falls asleep 10-11 PM, up frequently through out the night  Drinks throughout the day: water, juice, soda occasionally   Do you drink alcohol? No.     Patient may meet the criteria for binge eating disorder. Patient does not have grazing. Patient does not have night eating. Patient does have a history of emotional eating or eating out of boredom.    Surgery  Patient does feel confident in her ability to make these changes.  The patient's expectations of post-surgical eating habits are realistic.    Patient states she does understand the consequences of not complying with post-op food guidelines.  Patient states she does understands the long term changes in food intake that will be necessary for all occasions after surgery for the rest of her life.      Patient is deemed nutritionally appropriate to

## 2024-03-28 NOTE — PATIENT INSTRUCTIONS
Patient received dietary handouts and education.    Goals:   -Eat 4-5 times daily  -Avoid high fat and high sugar foods  -Include protein with all meals and snacks  -Avoid carbonation and caffeine  -Avoid calorie containing beverages  -Increase physical activity as tolerated      -Plan for Laparoscopic sleeve gastrectomy      Pre-operative work up Ordered:    - F/U in 4 weeks.   - Nutrition Labs.   - Protein Shake Trial.  - Psych Evaluation.   - EGD (endoscopy to check your stomach).  - Support Group Attendance.   - Obtain letter of medical necessity (PCP Letter).   - Quit Smoking,  Alcohol, Caffeine and Carbonated Drinks  - Obtain records for Weight History 2 yrs.   - Start Regular Exercise and track your activities.   - Start Tracking your food Intake and follow dietary guidelines.   - Avoid Pregnancy for 2 yrs from date of surgery. (for female patients in childbearing age)  - Referral to Behaviorist.           Patient advised that its their responsibility to follow up for studies, referrals and/or labs ordered today.

## 2024-03-28 NOTE — PROGRESS NOTES
[facility]   [facility]  Delroy Kelly MD, FACS, [facility]  [address], [address]    [facility] [place] [address] .  Phone: [phone]  Fax: [phone]       Chief Complaint   Patient presents with    Bariatric, Initial Visit     NP SHONA Spence           HPI:    Joanne Cm is a very pleasant 53 y.o. obese female ,   Body mass index is 37.41 kg/m².  And multiple medical problems who is presenting for weight loss surgery evaluation and consultation by Shiraz Brand.    Patient has been struggling for several years now with obesity. Patient feels the weight is an obstacle to achieve and perform things in daily living as well risk on health.     Tries to diet, and exercise but can't keep the weight off.  Patient tried  low carb, calorie restriction.   Patient has not participated in meal replacement/liquid diets.  Patient has participated in weight loss medications. Mounjaro  and other regimens, but with no sustainable weight loss.     Patient  is very determined to lose weight and be healthy, and is interested in surgical weight loss for future weight loss.   .    Otherwise patient denies any nausea, vomiting, fevers, chills, shortness of breath, chest pain, constipation or urinary symptoms.        Obesity related problems Joanne is dealing with:  Patient Active Problem List    Diagnosis Date Noted    Preoperative clearance 03/28/2024    Elevated low density lipoprotein (LDL) cholesterol level 03/28/2024    Chronic GERD 03/28/2024    Severe obesity (BMI 35.0-39.9) with comorbidity (HCC) 10/16/2023    Anxiety 07/29/2020    Moderate episode of recurrent major depressive disorder (HCC) 07/29/2020    SVT (supraventricular tachycardia) 05/22/2014           Pain Assessment   Denies any abdominal pain     Past Medical History:   Diagnosis Date    Anxiety     Back pain     Depression     Gastroenteritis     SVT (supraventricular tachycardia)      Past Surgical History:   Procedure

## 2024-03-28 NOTE — TELEPHONE ENCOUNTER
Pt called said provider gives her medication for nausea every so often she couldn't remember name would like some sent in.   [id number] - [place] [id number] [phone] - F [phone] [[id number]]

## 2024-04-05 SDOH — ECONOMIC STABILITY: FOOD INSECURITY: WITHIN THE PAST 12 MONTHS, YOU WORRIED THAT YOUR FOOD WOULD RUN OUT BEFORE YOU GOT MONEY TO BUY MORE.: OFTEN TRUE

## 2024-04-05 SDOH — ECONOMIC STABILITY: TRANSPORTATION INSECURITY
IN THE PAST 12 MONTHS, HAS LACK OF TRANSPORTATION KEPT YOU FROM MEETINGS, WORK, OR FROM GETTING THINGS NEEDED FOR DAILY LIVING?: YES

## 2024-04-05 SDOH — ECONOMIC STABILITY: INCOME INSECURITY: HOW HARD IS IT FOR YOU TO PAY FOR THE VERY BASICS LIKE FOOD, HOUSING, MEDICAL CARE, AND HEATING?: SOMEWHAT HARD

## 2024-04-05 SDOH — ECONOMIC STABILITY: FOOD INSECURITY: WITHIN THE PAST 12 MONTHS, THE FOOD YOU BOUGHT JUST DIDN'T LAST AND YOU DIDN'T HAVE MONEY TO GET MORE.: OFTEN TRUE

## 2024-04-08 ENCOUNTER — OFFICE VISIT (OUTPATIENT)
Dept: PRIMARY CARE CLINIC | Age: 54
End: 2024-04-08
Payer: COMMERCIAL

## 2024-04-08 VITALS
BODY MASS INDEX: 36.82 KG/M2 | HEART RATE: 84 BPM | DIASTOLIC BLOOD PRESSURE: 76 MMHG | TEMPERATURE: 97 F | SYSTOLIC BLOOD PRESSURE: 126 MMHG | HEIGHT: 65 IN | WEIGHT: 221 LBS

## 2024-04-08 DIAGNOSIS — E66.01 SEVERE OBESITY (BMI 35.0-39.9) WITH COMORBIDITY (HCC): ICD-10-CM

## 2024-04-08 DIAGNOSIS — F33.1 MODERATE EPISODE OF RECURRENT MAJOR DEPRESSIVE DISORDER (HCC): Primary | ICD-10-CM

## 2024-04-08 DIAGNOSIS — K21.9 CHRONIC GERD: ICD-10-CM

## 2024-04-08 DIAGNOSIS — F41.1 GAD (GENERALIZED ANXIETY DISORDER): ICD-10-CM

## 2024-04-08 DIAGNOSIS — E78.00 ELEVATED LOW DENSITY LIPOPROTEIN (LDL) CHOLESTEROL LEVEL: ICD-10-CM

## 2024-04-08 DIAGNOSIS — Z01.818 PREOPERATIVE CLEARANCE: ICD-10-CM

## 2024-04-08 LAB
25(OH)D3 SERPL-MCNC: 21.7 NG/ML
ALBUMIN SERPL-MCNC: 4.5 G/DL (ref 3.4–5)
ALBUMIN/GLOB SERPL: 1.5 {RATIO} (ref 1.1–2.2)
ALP SERPL-CCNC: 94 U/L (ref 40–129)
ALT SERPL-CCNC: 18 U/L (ref 10–40)
ANION GAP SERPL CALCULATED.3IONS-SCNC: 13 MMOL/L (ref 3–16)
AST SERPL-CCNC: 18 U/L (ref 15–37)
BASOPHILS # BLD: 0.1 K/UL (ref 0–0.2)
BASOPHILS NFR BLD: 1.1 %
BILIRUB SERPL-MCNC: 1.1 MG/DL (ref 0–1)
BUN SERPL-MCNC: 10 MG/DL (ref 7–20)
CALCIUM SERPL-MCNC: 10 MG/DL (ref 8.3–10.6)
CHLORIDE SERPL-SCNC: 99 MMOL/L (ref 99–110)
CHOLEST SERPL-MCNC: 183 MG/DL (ref 0–199)
CO2 SERPL-SCNC: 25 MMOL/L (ref 21–32)
CREAT SERPL-MCNC: 0.6 MG/DL (ref 0.6–1.1)
DEPRECATED RDW RBC AUTO: 13.9 % (ref 12.4–15.4)
EOSINOPHIL # BLD: 0.2 K/UL (ref 0–0.6)
EOSINOPHIL NFR BLD: 2.8 %
FOLATE SERPL-MCNC: >20 NG/ML (ref 4.78–24.2)
GFR SERPLBLD CREATININE-BSD FMLA CKD-EPI: >90 ML/MIN/{1.73_M2}
GLUCOSE SERPL-MCNC: 89 MG/DL (ref 70–99)
HCT VFR BLD AUTO: 48.3 % (ref 36–48)
HDLC SERPL-MCNC: 36 MG/DL (ref 40–60)
HGB BLD-MCNC: 16.1 G/DL (ref 12–16)
INR PPP: 1 (ref 0.85–1.15)
IRON SATN MFR SERPL: 44 % (ref 15–50)
IRON SERPL-MCNC: 130 UG/DL (ref 37–145)
LDLC SERPL CALC-MCNC: 120 MG/DL
LYMPHOCYTES # BLD: 1.4 K/UL (ref 1–5.1)
LYMPHOCYTES NFR BLD: 25.5 %
MCH RBC QN AUTO: 29.8 PG (ref 26–34)
MCHC RBC AUTO-ENTMCNC: 33.3 G/DL (ref 31–36)
MCV RBC AUTO: 89.5 FL (ref 80–100)
MONOCYTES # BLD: 0.4 K/UL (ref 0–1.3)
MONOCYTES NFR BLD: 7.8 %
NEUTROPHILS # BLD: 3.6 K/UL (ref 1.7–7.7)
NEUTROPHILS NFR BLD: 62.8 %
PLATELET # BLD AUTO: 241 K/UL (ref 135–450)
PLATELET BLD QL SMEAR: ADEQUATE
PMV BLD AUTO: 9.9 FL (ref 5–10.5)
POTASSIUM SERPL-SCNC: 4.3 MMOL/L (ref 3.5–5.1)
PROT SERPL-MCNC: 7.5 G/DL (ref 6.4–8.2)
PROTHROMBIN TIME: 13.4 SEC (ref 11.9–14.9)
RBC # BLD AUTO: 5.4 M/UL (ref 4–5.2)
SLIDE REVIEW: ABNORMAL
SODIUM SERPL-SCNC: 137 MMOL/L (ref 136–145)
TIBC SERPL-MCNC: 294 UG/DL (ref 260–445)
TRIGL SERPL-MCNC: 136 MG/DL (ref 0–150)
TSH SERPL DL<=0.005 MIU/L-ACNC: 0.95 UIU/ML (ref 0.27–4.2)
VIT B12 SERPL-MCNC: 939 PG/ML (ref 211–911)
VLDLC SERPL CALC-MCNC: 27 MG/DL
WBC # BLD AUTO: 5.7 K/UL (ref 4–11)

## 2024-04-08 PROCEDURE — 99214 OFFICE O/P EST MOD 30 MIN: CPT | Performed by: STUDENT IN AN ORGANIZED HEALTH CARE EDUCATION/TRAINING PROGRAM

## 2024-04-08 PROCEDURE — G8427 DOCREV CUR MEDS BY ELIG CLIN: HCPCS | Performed by: STUDENT IN AN ORGANIZED HEALTH CARE EDUCATION/TRAINING PROGRAM

## 2024-04-08 PROCEDURE — G8417 CALC BMI ABV UP PARAM F/U: HCPCS | Performed by: STUDENT IN AN ORGANIZED HEALTH CARE EDUCATION/TRAINING PROGRAM

## 2024-04-08 PROCEDURE — 1036F TOBACCO NON-USER: CPT | Performed by: STUDENT IN AN ORGANIZED HEALTH CARE EDUCATION/TRAINING PROGRAM

## 2024-04-08 PROCEDURE — 3017F COLORECTAL CA SCREEN DOC REV: CPT | Performed by: STUDENT IN AN ORGANIZED HEALTH CARE EDUCATION/TRAINING PROGRAM

## 2024-04-08 RX ORDER — VENLAFAXINE HYDROCHLORIDE 75 MG/1
CAPSULE, EXTENDED RELEASE ORAL
Qty: 90 CAPSULE | Refills: 3 | Status: SHIPPED | OUTPATIENT
Start: 2024-04-08

## 2024-04-08 ASSESSMENT — ENCOUNTER SYMPTOMS
VOMITING: 0
NAUSEA: 0

## 2024-04-08 NOTE — PROGRESS NOTES
2024     Joanne Cm (:  1970) is a 53 y.o. female, here for evaluation of the following medical concerns:    HPI  Mood Disorder:  Patient presents for follow-up of depression and anxiety disorder. Current complaints include: none.  She denies any other symptoms. Symptoms/signs of kati: none.  External stressors: nothing new. Current treatment includes: Effexor- 75.  Medication side effects: none.    Review of Systems   Constitutional:  Negative for activity change, fatigue and unexpected weight change.   Gastrointestinal:  Negative for nausea and vomiting.   Endocrine: Negative for cold intolerance and heat intolerance.   Skin:  Negative for rash.   Neurological:  Negative for dizziness, seizures and light-headedness.   Psychiatric/Behavioral:  Negative for agitation, decreased concentration, dysphoric mood, self-injury, sleep disturbance and suicidal ideas. The patient is not nervous/anxious.        Prior to Visit Medications    Medication Sig Taking? Authorizing Provider   venlafaxine (EFFEXOR XR) 75 MG extended release capsule TAKE 1 CAPSULE BY MOUTH EVERY DAY Yes Shiraz Randall DO   ibuprofen (ADVIL;MOTRIN) 800 MG tablet TAKE 1 TABLET BY MOUTH 4 TIMES A DAY AS NEEDED for pain FOR up to 7 DAYS Yes Provider, MD Romeo   ferrous gluconate (FERGON) 324 (38 Fe) MG tablet TAKE 1 TABLET BY MOUTH TWO TIMES A DAY FOR ANEMIA Yes Shiraz Randall DO        Social History     Tobacco Use    Smoking status: Never    Smokeless tobacco: Never   Substance Use Topics    Alcohol use: Not Currently     Alcohol/week: 2.0 standard drinks of alcohol     Types: 2 Glasses of wine per week        Vitals:    24 0925   BP: 136/80   Pulse: 84   Temp: 97 °F (36.1 °C)   TempSrc: Temporal   Weight: 100.2 kg (221 lb)   Height: 1.651 m (5' 5\")     Estimated body mass index is 36.78 kg/m² as calculated from the following:    Height as of this encounter: 1.651 m (5' 5\").    Weight as of this encounter: 100.2 kg (221

## 2024-04-08 NOTE — PATIENT INSTRUCTIONS
[facility] Transportation Resources*  (Call 211 if need more resources.)       Non-Emergency Transportation: Non-emergency medical transportation is for Medicaid members who do not have access to free transportation that suits their medical needs and need to be transported to a Medicaid-covered service performed by a Medicaid enrolled provider.     [facility]: [phone]  [facility]: [phone]  [facility]: [phone]  [facility]: [phone]  [facility]: [phone]  [facility]: [phone]. [phone]  [facility]: [phone]   [facility]: [place], [place], [place], [place], [place], [place], and [facility] [phone]  [place]: [phone] for non-managed Medicaid.      Public Transportation Services: Small fee may be associated with service    [facility] Access: [phone]  [place] [facility]: [phone]  [facility]: [phone], ext. [phone]  [facility] Transit Services: [phone]  [facility] Transit [facility]: [phone]  [facility] Transportation: [phone]  [place]- [facility] Authority [facility] (T[employer]): [phone]    [facility]   What they offer: Transportation Services also offers convenient group shopping trips. Seniors can socialize with their friends as they shop and complete other errands.  Website:  [email or web address]   Phone Number: [phone]   Eligibility Requirements: Older adults (60+)  Areas Covered: Areas we currently serve include [facility], [place], [place], [place], [place], [facility], [place], [place], [place], [place], [place], [place], [place], [place], and [place]. Don’t see your area on our list? Contact us to see if transportation can be arranged.    Senior Transportation Programs  What

## 2024-04-09 LAB
EST. AVERAGE GLUCOSE BLD GHB EST-MCNC: 88.2 MG/DL
HBA1C MFR BLD: 4.7 %

## 2024-04-11 LAB
A-TOCOPHEROL VIT E SERPL-MCNC: 8 MG/L (ref 5.5–18)
ANNOTATION COMMENT IMP: NORMAL
BETA+GAMMA TOCOPHEROL SERPL-MCNC: 2.1 MG/L (ref 0–6)
RETINYL PALMITATE SERPL-MCNC: <0.02 MG/L (ref 0–0.1)
VIT A SERPL-MCNC: 0.48 MG/L (ref 0.3–1.2)

## 2024-04-13 LAB — VIT B1 BLD-MCNC: 171 NMOL/L (ref 70–180)

## 2024-04-18 NOTE — PROGRESS NOTES
CARDIOLOGY CONSULTATION        Patient Name: Joanne Cm  Primary Care physician: Shiraz Randall DO    Reason for Referral/Chief Complaint: Joanne Cm is a 53 y.o. patient who is referred to cardiology clinic today for preoperative clearance.     History of Present Illness:   Joanne Cm is a 53 y.o. with a past medical history of anxiety, SVT s/p ablation, depression and obesity.     Patient was seen by Dr. Gutierrez 23 for SVT. Noted to have been diagnosed with SVT since , and had multiple episodes throughout the year lasting 20 minutes. She had went to the ED 23 for SVT, and treated with Adenosine. On 23, she had a successful ablation with Dr. Gutierrez. Most recently seen by Dr. Kelly 3/28/24 for weight loss surgery consultation.     Today, 24, patient states she is feeling good. She states she is looking to have weight loss surgery with Dr. Kelly in September. Patient states prior to her ablation with Dr. Gutierrez she was very fatigued and SOB, but this has now improved. Patient states she is looking to have surgery to be able to be more active with her grandchildren. Patient denies tobacco use. Patient denies illicit drug use. Patient states she drinks socially. Family history includes grandfather had CHF.     The patient denies chest pain, shortness of breath at rest and dyspnea with exertion. Denies palpitations, dizziness, near-syncope or christina syncope. Denies paroxysmal nocturnal dyspnea, orthopnea, bendopnea, increasing lower extremity edema or weight gain.  The patient endorses highest level of activity as walking.    Past Medical History:   has a past medical history of Anxiety, Back pain, Depression, Gastroenteritis, and SVT (supraventricular tachycardia) (HCC).    Surgical History:   has a past surgical history that includes Hysterectomy; Facial Surgery (N/A, 2023);  section (); Hysterectomy, total abdominal; and Hysterectomy, vaginal.     Social

## 2024-04-19 ENCOUNTER — TELEPHONE (OUTPATIENT)
Dept: PRIMARY CARE CLINIC | Age: 54
End: 2024-04-19

## 2024-04-19 NOTE — TELEPHONE ENCOUNTER
Pt would like a back dated note starting August of last year. Stating she is physically able to work. Would like sent to her mychart

## 2024-04-22 PROBLEM — Z01.818 PREOPERATIVE CLEARANCE: Status: RESOLVED | Noted: 2024-03-28 | Resolved: 2024-04-22

## 2024-04-23 ENCOUNTER — OFFICE VISIT (OUTPATIENT)
Dept: CARDIOLOGY CLINIC | Age: 54
End: 2024-04-23
Payer: COMMERCIAL

## 2024-04-23 VITALS
BODY MASS INDEX: 37.32 KG/M2 | HEART RATE: 92 BPM | HEIGHT: 65 IN | DIASTOLIC BLOOD PRESSURE: 80 MMHG | SYSTOLIC BLOOD PRESSURE: 114 MMHG | OXYGEN SATURATION: 98 % | WEIGHT: 224 LBS

## 2024-04-23 DIAGNOSIS — F41.9 ANXIETY AND DEPRESSION: ICD-10-CM

## 2024-04-23 DIAGNOSIS — Z98.890 H/O CARDIAC RADIOFREQUENCY ABLATION: ICD-10-CM

## 2024-04-23 DIAGNOSIS — Z01.818 PREOPERATIVE CLEARANCE: Primary | ICD-10-CM

## 2024-04-23 DIAGNOSIS — F32.A ANXIETY AND DEPRESSION: ICD-10-CM

## 2024-04-23 DIAGNOSIS — E66.9 CLASS II OBESITY: ICD-10-CM

## 2024-04-23 PROBLEM — E66.812 CLASS II OBESITY: Status: ACTIVE | Noted: 2024-04-23

## 2024-04-23 PROCEDURE — 93000 ELECTROCARDIOGRAM COMPLETE: CPT | Performed by: INTERNAL MEDICINE

## 2024-04-23 PROCEDURE — 99244 OFF/OP CNSLTJ NEW/EST MOD 40: CPT | Performed by: INTERNAL MEDICINE

## 2024-04-23 NOTE — PATIENT INSTRUCTIONS
Plan:   Order stress echocardiogram to assess heart function   Stress Test instructions:   -Nothing to eat or drink 4 hours prior to testing. May take prescribed medications in morning with sip of water.  -Hold diabetes medication and Insulin morning of testing. Bring glucose meter and glucose tablet if available.   -Do not drink or eat anything containing caffeine 24 hours prior to test. This includes coffee, decaffeinated coffee, chocolate, soda, or tea.  -No smoking for 12 hours prior to testing.   Discussed cholesterol level and encouraged diet and lifestyle changes.  Cardiac risk evaluation will be determined based on stress echo results.  Follow up with me in 2 months    Your provider has ordered testing for further evaluation.  An order/prescription has been included in your paper work.   To schedule outpatient testing, contact Central Scheduling by calling [address] [place] [address] ([phone]).

## 2024-04-25 ENCOUNTER — OFFICE VISIT (OUTPATIENT)
Dept: BARIATRICS/WEIGHT MGMT | Age: 54
End: 2024-04-25
Payer: COMMERCIAL

## 2024-04-25 VITALS
HEIGHT: 65 IN | HEART RATE: 85 BPM | OXYGEN SATURATION: 97 % | DIASTOLIC BLOOD PRESSURE: 78 MMHG | SYSTOLIC BLOOD PRESSURE: 122 MMHG | BODY MASS INDEX: 37.52 KG/M2 | WEIGHT: 225.2 LBS | RESPIRATION RATE: 18 BRPM

## 2024-04-25 DIAGNOSIS — K21.9 CHRONIC GERD: ICD-10-CM

## 2024-04-25 DIAGNOSIS — E78.00 ELEVATED LOW DENSITY LIPOPROTEIN (LDL) CHOLESTEROL LEVEL: ICD-10-CM

## 2024-04-25 DIAGNOSIS — E66.01 SEVERE OBESITY (BMI 35.0-39.9) WITH COMORBIDITY (HCC): Primary | ICD-10-CM

## 2024-04-25 PROCEDURE — 1036F TOBACCO NON-USER: CPT | Performed by: SURGERY

## 2024-04-25 PROCEDURE — G8417 CALC BMI ABV UP PARAM F/U: HCPCS | Performed by: SURGERY

## 2024-04-25 PROCEDURE — G8427 DOCREV CUR MEDS BY ELIG CLIN: HCPCS | Performed by: SURGERY

## 2024-04-25 PROCEDURE — 99214 OFFICE O/P EST MOD 30 MIN: CPT | Performed by: SURGERY

## 2024-04-25 PROCEDURE — 3017F COLORECTAL CA SCREEN DOC REV: CPT | Performed by: SURGERY

## 2024-04-25 NOTE — PROGRESS NOTES
Joanne Cm stable / unchanged.     Breakfast: CC + HB egg + toast  Snack: None  Lunch: Grilled chix sandwich, plain on brioche + yogurt   Snack: Chips OR PB crackers OR none   Dinner: Salad w/ sunflower seeds/raisins/ vinaigrette dressing  Snack: None - water    Is pt consuming smaller portions? Generally taking less, using smaller plates    Is pt consuming at least 64 oz of fluids per day?  3 bottles water    Is pt consuming carbonated, caffeinated, or sugary beverages?  avoiding juice + soda    Has pt sampled Unjury and/or Nectar protein? Encouraged pt to try grab'n'go's before next appointment.    Has patient attended a support group? Not scheduled     Exercise: ADL    Plan/Recommendations:   - Continue eating Bfast daily and add 1 snack/day  - Add 4 th bottle water  - Try Protein Powder  - Schedule Support Group    Handouts: SG24    Sara Robles RD, LD

## 2024-04-25 NOTE — PATIENT INSTRUCTIONS
Patient received dietary handouts and education.    Plan/Recommendations:   - Continue eating Bfast daily and add 1 snack/day  - Add 4 th bottle water  - Try Protein Powder  - Schedule Support Group

## 2024-04-25 NOTE — PROGRESS NOTES
[facility]   Weight Management Solutions  Delroy Kelly MD, FACS, [facility]  [address], [address]    [facility] [place] [facility] [address] .  Phone: [phone]  Fax: [phone]          Chief Complaint   Patient presents with    Obesity     2nd pre-surg         HPI:     Joanne Cm is a very pleasant 53 y.o. female with Body mass index is 37.48 kg/m². / Chronic Obesity.     Joanne has been struggling for several years now with obesity. Joanne feels the weight is an obstacle to achieve and perform things in daily living as well risk on health.     Patient  is very determined to lose weight and be healthy, and is working towards  surgical weight loss to achieve this goal. Pre-operative clearance and work up pending. Working hard to keep good dietary habits as well level of activity.  Patient denies any nausea, vomiting, fevers, chills, shortness of breath, chest pain, cough, constipation or difficulty urinating.    Pain Assessment   Denies any abdominal pain       Past Medical History:   Diagnosis Date    Anxiety     Back pain     Depression     Gastroenteritis     SVT (supraventricular tachycardia) (HCC)      Past Surgical History:   Procedure Laterality Date     SECTION      FACIAL SURGERY N/A 2023    EXCISION OF SCALP LESION, POSSIBLE ADJACENT TISSUE TRANSFER performed by Carolyn Leon MD at [facility] OR    HYSTERECTOMY (CERVIX STATUS UNKNOWN)      HYSTERECTOMY, TOTAL ABDOMINAL (CERVIX REMOVED)      HYSTERECTOMY, VAGINAL       Family History   Problem Relation Age of Onset    Heart Disease Mother     No Known Problems Father     Breast Cancer Sister     Breast Cancer Maternal Aunt      Social History     Tobacco Use    Smoking status: Never    Smokeless tobacco: Never   Substance Use Topics    Alcohol use: Not Currently     Alcohol/week: 2.0 standard drinks of alcohol     Types: 2 Glasses of wine per week     I counseled the patient on the importance of not smoking and risks of ETOH.

## 2024-05-06 DIAGNOSIS — D50.0 IRON DEFICIENCY ANEMIA DUE TO CHRONIC BLOOD LOSS: ICD-10-CM

## 2024-05-06 RX ORDER — FERROUS GLUCONATE 324(38)MG
TABLET ORAL
Qty: 180 TABLET | Refills: 0 | Status: SHIPPED | OUTPATIENT
Start: 2024-05-06

## 2024-05-06 NOTE — TELEPHONE ENCOUNTER
Medication:   Requested Prescriptions     Pending Prescriptions Disp Refills    ferrous gluconate (FERGON) 324 (38 Fe) MG tablet [Pharmacy Med Name: Ferrous Gluconate Oral Tablet 324 (38 Fe) MG] 180 tablet 0     Sig: TAKE 1 TABLET BY MOUTH 2 TIMES A DAY FOR ANEMIA        Last Filled:  11/08/23    Patient Phone Number: [phone] (home) [phone] (work)    Last appt: 4/8/2024   Next appt: 10/16/2024    Last OARRS:        No data to display

## 2024-05-07 ENCOUNTER — TELEPHONE (OUTPATIENT)
Dept: PRIMARY CARE CLINIC | Age: 54
End: 2024-05-07

## 2024-05-07 ENCOUNTER — PREP FOR PROCEDURE (OUTPATIENT)
Dept: BARIATRICS/WEIGHT MGMT | Age: 54
End: 2024-05-07

## 2024-05-08 RX ORDER — SODIUM CHLORIDE 0.9 % (FLUSH) 0.9 %
5-40 SYRINGE (ML) INJECTION PRN
Status: CANCELLED | OUTPATIENT
Start: 2024-05-08

## 2024-05-08 RX ORDER — SODIUM CHLORIDE 0.9 % (FLUSH) 0.9 %
5-40 SYRINGE (ML) INJECTION EVERY 12 HOURS SCHEDULED
Status: CANCELLED | OUTPATIENT
Start: 2024-05-08

## 2024-05-08 RX ORDER — SODIUM CHLORIDE 9 MG/ML
25 INJECTION, SOLUTION INTRAVENOUS PRN
Status: CANCELLED | OUTPATIENT
Start: 2024-05-08

## 2024-05-09 ENCOUNTER — HOSPITAL ENCOUNTER (OUTPATIENT)
Age: 54
Discharge: HOME | End: 2024-05-11
Attending: INTERNAL MEDICINE
Payer: COMMERCIAL

## 2024-05-09 VITALS
HEIGHT: 65 IN | WEIGHT: 225 LBS | SYSTOLIC BLOOD PRESSURE: 122 MMHG | DIASTOLIC BLOOD PRESSURE: 78 MMHG | BODY MASS INDEX: 37.49 KG/M2

## 2024-05-09 DIAGNOSIS — Z01.818 PREOPERATIVE CLEARANCE: ICD-10-CM

## 2024-05-09 LAB
ECHO BSA: 2.16 M2
STRESS ANGINA INDEX: 0
STRESS BASELINE HR: 90 BPM
STRESS BASELINE ST DEPRESSION: 0 MM
STRESS PEAK DIAS BP: 96 MMHG
STRESS PEAK SYS BP: 131 MMHG
STRESS PERCENT HR ACHIEVED: 99 %
STRESS POST PEAK HR: 166 BPM
STRESS RATE PRESSURE PRODUCT: NORMAL BPM*MMHG
STRESS ST DEPRESSION: 0 MM
STRESS TARGET HR: 168 BPM

## 2024-05-09 PROCEDURE — 6360000004 HC RX CONTRAST MEDICATION: Performed by: INTERNAL MEDICINE

## 2024-05-09 PROCEDURE — 93017 CV STRESS TEST TRACING ONLY: CPT

## 2024-05-09 RX ADMIN — PERFLUTREN 3 ML: 6.52 INJECTION, SUSPENSION INTRAVENOUS at 09:37

## 2024-05-10 ENCOUNTER — TELEPHONE (OUTPATIENT)
Dept: CARDIOLOGY CLINIC | Age: 54
End: 2024-05-10

## 2024-05-10 NOTE — TELEPHONE ENCOUNTER
----- Message from Keyon Gresham MD sent at 5/10/2024 10:53 AM EDT -----  Please inform patient stress echo was normal, continue current regimen and keep FU as scheduled.  ----- Message -----  From: Darrel Mehta MD  Sent: 5/9/2024   4:46 PM EDT  To: Keyon Gresham MD

## 2024-05-13 ENCOUNTER — OFFICE VISIT (OUTPATIENT)
Dept: PRIMARY CARE CLINIC | Age: 54
End: 2024-05-13
Payer: COMMERCIAL

## 2024-05-13 ENCOUNTER — TELEPHONE (OUTPATIENT)
Dept: BARIATRICS/WEIGHT MGMT | Age: 54
End: 2024-05-13

## 2024-05-13 VITALS
TEMPERATURE: 97.7 F | WEIGHT: 225 LBS | HEIGHT: 65 IN | HEART RATE: 77 BPM | OXYGEN SATURATION: 98 % | BODY MASS INDEX: 37.49 KG/M2 | SYSTOLIC BLOOD PRESSURE: 121 MMHG | DIASTOLIC BLOOD PRESSURE: 78 MMHG

## 2024-05-13 DIAGNOSIS — Z12.31 BREAST CANCER SCREENING BY MAMMOGRAM: ICD-10-CM

## 2024-05-13 DIAGNOSIS — F33.1 MODERATE EPISODE OF RECURRENT MAJOR DEPRESSIVE DISORDER (HCC): ICD-10-CM

## 2024-05-13 DIAGNOSIS — Z23 NEED FOR TDAP VACCINATION: ICD-10-CM

## 2024-05-13 DIAGNOSIS — I47.10 SVT (SUPRAVENTRICULAR TACHYCARDIA) (HCC): ICD-10-CM

## 2024-05-13 DIAGNOSIS — Z00.00 ROUTINE GENERAL MEDICAL EXAMINATION AT A HEALTH CARE FACILITY: Primary | ICD-10-CM

## 2024-05-13 DIAGNOSIS — E66.01 SEVERE OBESITY (BMI 35.0-39.9) WITH COMORBIDITY (HCC): ICD-10-CM

## 2024-05-13 PROBLEM — Z01.818 PREOPERATIVE CLEARANCE: Status: RESOLVED | Noted: 2024-03-28 | Resolved: 2024-05-13

## 2024-05-13 PROBLEM — E66.9 CLASS II OBESITY: Status: RESOLVED | Noted: 2024-04-23 | Resolved: 2024-05-13

## 2024-05-13 PROBLEM — F41.9 ANXIETY AND DEPRESSION: Status: RESOLVED | Noted: 2020-07-29 | Resolved: 2024-05-13

## 2024-05-13 PROBLEM — F32.A ANXIETY AND DEPRESSION: Status: RESOLVED | Noted: 2020-07-29 | Resolved: 2024-05-13

## 2024-05-13 PROBLEM — E66.812 CLASS II OBESITY: Status: RESOLVED | Noted: 2024-04-23 | Resolved: 2024-05-13

## 2024-05-13 PROCEDURE — 90471 IMMUNIZATION ADMIN: CPT | Performed by: STUDENT IN AN ORGANIZED HEALTH CARE EDUCATION/TRAINING PROGRAM

## 2024-05-13 PROCEDURE — 99396 PREV VISIT EST AGE 40-64: CPT | Performed by: STUDENT IN AN ORGANIZED HEALTH CARE EDUCATION/TRAINING PROGRAM

## 2024-05-13 PROCEDURE — 90715 TDAP VACCINE 7 YRS/> IM: CPT | Performed by: STUDENT IN AN ORGANIZED HEALTH CARE EDUCATION/TRAINING PROGRAM

## 2024-05-13 RX ORDER — VENLAFAXINE HYDROCHLORIDE 75 MG/1
CAPSULE, EXTENDED RELEASE ORAL
Qty: 90 CAPSULE | Refills: 3 | Status: SHIPPED | OUTPATIENT
Start: 2024-05-13

## 2024-05-13 ASSESSMENT — ENCOUNTER SYMPTOMS
DIARRHEA: 0
BLOOD IN STOOL: 0
CHEST TIGHTNESS: 0
ABDOMINAL PAIN: 0
CONSTIPATION: 0
BACK PAIN: 0
STRIDOR: 0
SHORTNESS OF BREATH: 0

## 2024-05-13 NOTE — PROGRESS NOTES
2024    Joanne Cm (:  1970) is a 53 y.o. female, here for evaluation of the following medical concerns:    HPI    Well Adult Physical: Patient here for a comprehensive physical exam. The patient reports no problems.  Do you take any herbs or supplements that were not prescribed by a doctor? no Are you taking calcium supplements? not applicable Are you taking aspirin daily? not applicable    Sexual activity: single partner, contraception - condoms   Diet: Unhealthy  Exercise: no regular exercise  Seatbelt use: yes    Review of Systems   Constitutional:  Negative for activity change, appetite change, fatigue and unexpected weight change.   HENT:  Negative for hearing loss.    Eyes:  Negative for visual disturbance.   Respiratory:  Negative for chest tightness, shortness of breath and stridor.    Cardiovascular:  Negative for chest pain and palpitations.   Gastrointestinal:  Negative for abdominal pain, blood in stool, constipation and diarrhea.   Endocrine: Negative for polydipsia, polyphagia and polyuria.   Genitourinary:  Negative for dysuria, genital sores, menstrual problem, pelvic pain, vaginal bleeding, vaginal discharge and vaginal pain.   Musculoskeletal:  Negative for arthralgias and back pain.   Skin:  Negative for rash.   Allergic/Immunologic: Negative for environmental allergies.   Neurological:  Negative for dizziness, syncope and headaches.   Hematological:  Negative for adenopathy.   Psychiatric/Behavioral:  Negative for dysphoric mood. The patient is not nervous/anxious.        Prior to Visit Medications    Medication Sig Taking? Authorizing Provider   venlafaxine (EFFEXOR XR) 75 MG extended release capsule TAKE 1 CAPSULE BY MOUTH EVERY DAY Yes Shiraz Randall DO   ferrous gluconate (FERGON) 324 (38 Fe) MG tablet TAKE 1 TABLET BY MOUTH 2 TIMES A DAY FOR ANEMIA Yes Shiraz Randall DO   ibuprofen (ADVIL;MOTRIN) 800 MG tablet TAKE 1 TABLET BY MOUTH 4 TIMES A DAY AS NEEDED for pain FOR up to  Detail Level: Detailed Quality 130: Documentation Of Current Medications In The Medical Record: Current Medications Documented Quality 110: Preventive Care And Screening: Influenza Immunization: Influenza immunization was not ordered or administered, reason not given

## 2024-05-13 NOTE — TELEPHONE ENCOUNTER
Pt calling in, stated at her last visit, she was advised to take Vit D 68343 once weekly, nothing was sent to pt pharm.  Is this ok to place order for Vit d 50821 weekly    Please advise

## 2024-05-15 ENCOUNTER — CLINICAL DOCUMENTATION (OUTPATIENT)
Dept: BARIATRICS/WEIGHT MGMT | Age: 54
End: 2024-05-15

## 2024-05-15 NOTE — PROGRESS NOTES
Joanne Cm lost 3 lbs over 1 month.  Pt's self reported weight at 222 lbs.    This eval was conducted via phone on 5/15/24 in preparation or their pre-surgical visit on 5/16/24 with Dr. Kelly.      Breakfast: boiled egg + cottage cheese + toast  Snack: carrots with ranch dip   Lunch: none or turkey sandwich with fruit   Snack: none  Dinner: turkey breast or fish or broccoli or peas or carrots  Snack: none     Fluids:  water-48-64 oz/day; propel, decaffeinated tea-12 oz/day,, lactaid-1 cup/day     Is pt eating a lean protein source with all meals and snacks?  Yes besides snack and skips lunch some days     Has pt decreased their portions using the plate method?  Yes she has     Is pt drinking at least 64 oz of clear liquids everyday?  Most days; some days slightly below 64 oz./day     Has pt stopped drinking carbonation, caffeinated, and sugar sweetened beverages?  yes    Has pt sampled Unjury and/or Nectar protein?  None yet     Has pt attended a support group? Not scheduled -sent support group schedule via email     Participating in intentional exercise?  Walking  in evening     Plan/Recommendations:   Eat lunch daily  Schedule support group  Sample protein powder  Aim for 64 oz./day of fluids   Add protein to carrots-string cheese or PB or cottage cheese    Handouts: Support Group Schedule    Roxi Santiago, RD, LD

## 2024-05-16 ENCOUNTER — OFFICE VISIT (OUTPATIENT)
Dept: BARIATRICS/WEIGHT MGMT | Age: 54
End: 2024-05-16
Payer: COMMERCIAL

## 2024-05-16 VITALS
HEART RATE: 96 BPM | RESPIRATION RATE: 18 BRPM | DIASTOLIC BLOOD PRESSURE: 70 MMHG | BODY MASS INDEX: 37.49 KG/M2 | WEIGHT: 225 LBS | SYSTOLIC BLOOD PRESSURE: 124 MMHG | OXYGEN SATURATION: 98 % | HEIGHT: 65 IN

## 2024-05-16 DIAGNOSIS — E78.00 ELEVATED LOW DENSITY LIPOPROTEIN (LDL) CHOLESTEROL LEVEL: ICD-10-CM

## 2024-05-16 DIAGNOSIS — E66.01 SEVERE OBESITY (BMI 35.0-39.9) WITH COMORBIDITY (HCC): Primary | ICD-10-CM

## 2024-05-16 DIAGNOSIS — K21.9 CHRONIC GERD: ICD-10-CM

## 2024-05-16 PROCEDURE — G8427 DOCREV CUR MEDS BY ELIG CLIN: HCPCS | Performed by: SURGERY

## 2024-05-16 PROCEDURE — G8417 CALC BMI ABV UP PARAM F/U: HCPCS | Performed by: SURGERY

## 2024-05-16 PROCEDURE — 1036F TOBACCO NON-USER: CPT | Performed by: SURGERY

## 2024-05-16 PROCEDURE — 3017F COLORECTAL CA SCREEN DOC REV: CPT | Performed by: SURGERY

## 2024-05-16 PROCEDURE — 99214 OFFICE O/P EST MOD 30 MIN: CPT | Performed by: SURGERY

## 2024-05-16 NOTE — PROGRESS NOTES
[facility]   Weight Management Solutions  Delroy Kelly MD, FACS, [facility]  [address], [address]    [facility] [address] .  Phone: [phone]  Fax: [phone]          Chief Complaint   Patient presents with    Obesity     3rd pre-surg         HPI:     Joanne Cm is a very pleasant 53 y.o. female with Body mass index is 37.44 kg/m². / Chronic Obesity.     Joanne has been struggling for several years now with obesity. Joanne feels the weight is an obstacle to achieve and perform things in daily living as well risk on health.     Patient  is very determined to lose weight and be healthy, and is working towards  surgical weight loss to achieve this goal. Pre-operative clearance and work up pending. Working hard to keep good dietary habits as well level of activity.  Patient denies any nausea, vomiting, fevers, chills, shortness of breath, chest pain, cough, constipation or difficulty urinating.    Pain Assessment   Denies any abdominal pain       Past Medical History:   Diagnosis Date    Anxiety     Back pain     Depression     Gastroenteritis     SVT (supraventricular tachycardia) (HCC)      Past Surgical History:   Procedure Laterality Date     SECTION      FACIAL SURGERY N/A 2023    EXCISION OF SCALP LESION, POSSIBLE ADJACENT TISSUE TRANSFER performed by Carolyn Leon MD at [facility] OR    HYSTERECTOMY (CERVIX STATUS UNKNOWN)      HYSTERECTOMY, TOTAL ABDOMINAL (CERVIX REMOVED)      HYSTERECTOMY, VAGINAL       Family History   Problem Relation Age of Onset    Heart Disease Mother     No Known Problems Father     Breast Cancer Sister     Breast Cancer Maternal Aunt      Social History     Tobacco Use    Smoking status: Never    Smokeless tobacco: Never   Substance Use Topics    Alcohol use: Not Currently     Alcohol/week: 2.0 standard drinks of alcohol     Types: 2 Glasses of wine per week     I counseled the patient on the importance of not smoking and risks of ETOH.

## 2024-05-17 NOTE — PROGRESS NOTES
Patient reached __X__ yes  _____ no   VM instructions left ____ yes   phone number ________                                ____ no-office notified          Date _5/24/24________  Time __per office_____  Arrival __per office____    Nothing to eat or drink after midnight-follow your doctors prep instructions-this may include taking a second dose of your prep after midnight  Responsible adult 18 or older to stay on site while you are here-drive you home-stay with you after  Follow any instructions your doctors office has given you  Bring a complete list of all your medications and supplements including name,dose,how often taken the day of your procedure  If you normally take the following medications in the morning please do so the AM of your procedure with a small sip of water       Heart,blood pressure,seizure,thyroid or breathing medications-use your inhalers-bring any rescue inhalers with you DOS       DO NOT take blood pressure medications ending in \"barrington\" or \"pril\" the AM of procedure or evening prior  Dr Mckeon patients are not to take any medications the AM of surgery  Take half or your normal dose of any long acting insulins the night before your procedure-do not take any diabetic medications the AM of procedure  Follow your doctors instructions regarding stopping or taking  any blood thinners-if you do not have instructions-call them  Any questions call your doctor  Other ______________________________________________________________      VISITOR POLICY(subject to change)             The current policy is 2 visitors per patient.There are no children allowed.Mask at discretion of facility. Visiting hours are 8a-8p.Overnight visitors will be at the discretion of the nurse. All policies are subject to change.

## 2024-05-20 ENCOUNTER — TELEPHONE (OUTPATIENT)
Dept: BARIATRICS/WEIGHT MGMT | Age: 54
End: 2024-05-20

## 2024-05-21 ENCOUNTER — HOSPITAL ENCOUNTER (OUTPATIENT)
Age: 54
Discharge: HOME | End: 2024-05-23
Attending: INTERNAL MEDICINE
Payer: COMMERCIAL

## 2024-05-21 VITALS
HEIGHT: 66 IN | WEIGHT: 222 LBS | DIASTOLIC BLOOD PRESSURE: 70 MMHG | SYSTOLIC BLOOD PRESSURE: 124 MMHG | BODY MASS INDEX: 35.68 KG/M2

## 2024-05-21 DIAGNOSIS — Z01.818 PREOPERATIVE CLEARANCE: ICD-10-CM

## 2024-05-21 DIAGNOSIS — E66.9 CLASS II OBESITY: ICD-10-CM

## 2024-05-21 DIAGNOSIS — Z98.890 H/O CARDIAC RADIOFREQUENCY ABLATION: ICD-10-CM

## 2024-05-21 LAB
ECHO AO ARCH DIAM: 2.8 CM
ECHO AO ROOT DIAM: 2.9 CM
ECHO AO ROOT INDEX: 1.39 CM/M2
ECHO AV AREA PEAK VELOCITY: 3.3 CM2
ECHO AV AREA VTI: 3 CM2
ECHO AV AREA/BSA PEAK VELOCITY: 1.6 CM2/M2
ECHO AV AREA/BSA VTI: 1.4 CM2/M2
ECHO AV MEAN GRADIENT: 2 MMHG
ECHO AV MEAN VELOCITY: 0.7 M/S
ECHO AV PEAK GRADIENT: 4 MMHG
ECHO AV PEAK VELOCITY: 1 M/S
ECHO AV VELOCITY RATIO: 0.8
ECHO AV VTI: 19.9 CM
ECHO BSA: 2.17 M2
ECHO IVC PROX: 1.3 CM
ECHO LA AREA 2C: 12.2 CM2
ECHO LA AREA 4C: 11.6 CM2
ECHO LA DIAMETER INDEX: 1.29 CM/M2
ECHO LA DIAMETER: 2.7 CM
ECHO LA MAJOR AXIS: 4.2 CM
ECHO LA MINOR AXIS: 4.5 CM
ECHO LA TO AORTIC ROOT RATIO: 0.93
ECHO LA VOL BP: 27 ML (ref 22–52)
ECHO LA VOL MOD A2C: 27 ML (ref 22–52)
ECHO LA VOL MOD A4C: 25 ML (ref 22–52)
ECHO LA VOL/BSA BIPLANE: 13 ML/M2 (ref 16–34)
ECHO LA VOLUME INDEX MOD A2C: 13 ML/M2 (ref 16–34)
ECHO LA VOLUME INDEX MOD A4C: 12 ML/M2 (ref 16–34)
ECHO LV E' LATERAL VELOCITY: 10 CM/S
ECHO LV E' SEPTAL VELOCITY: 8 CM/S
ECHO LV EDV A2C: 109 ML
ECHO LV EDV A4C: 91 ML
ECHO LV EDV INDEX A4C: 44 ML/M2
ECHO LV EDV NDEX A2C: 52 ML/M2
ECHO LV EJECTION FRACTION A2C: 68 %
ECHO LV EJECTION FRACTION A4C: 64 %
ECHO LV EJECTION FRACTION BIPLANE: 65 % (ref 55–100)
ECHO LV ESV A2C: 35 ML
ECHO LV ESV A4C: 32 ML
ECHO LV ESV INDEX A2C: 17 ML/M2
ECHO LV ESV INDEX A4C: 15 ML/M2
ECHO LV FRACTIONAL SHORTENING: 30 % (ref 28–44)
ECHO LV INTERNAL DIMENSION DIASTOLE INDEX: 2.11 CM/M2
ECHO LV INTERNAL DIMENSION DIASTOLIC: 4.4 CM (ref 3.9–5.3)
ECHO LV INTERNAL DIMENSION SYSTOLIC INDEX: 1.48 CM/M2
ECHO LV INTERNAL DIMENSION SYSTOLIC: 3.1 CM
ECHO LV IVSD: 1 CM (ref 0.6–0.9)
ECHO LV MASS 2D: 137.8 G (ref 67–162)
ECHO LV MASS INDEX 2D: 65.9 G/M2 (ref 43–95)
ECHO LV POSTERIOR WALL DIASTOLIC: 0.9 CM (ref 0.6–0.9)
ECHO LV RELATIVE WALL THICKNESS RATIO: 0.41
ECHO LVOT AREA: 3.8 CM2
ECHO LVOT AV VTI INDEX: 0.8
ECHO LVOT DIAM: 2.2 CM
ECHO LVOT MEAN GRADIENT: 1 MMHG
ECHO LVOT PEAK GRADIENT: 3 MMHG
ECHO LVOT PEAK VELOCITY: 0.8 M/S
ECHO LVOT STROKE VOLUME INDEX: 28.9 ML/M2
ECHO LVOT SV: 60.4 ML
ECHO LVOT VTI: 15.9 CM
ECHO MV A VELOCITY: 0.72 M/S
ECHO MV E VELOCITY: 0.63 M/S
ECHO MV E/A RATIO: 0.88
ECHO MV E/E' LATERAL: 6.3
ECHO MV E/E' RATIO (AVERAGED): 7.09
ECHO MV E/E' SEPTAL: 7.88
ECHO PV MAX VELOCITY: 0.6 M/S
ECHO PV MEAN GRADIENT: 1 MMHG
ECHO PV MEAN VELOCITY: 0.4 M/S
ECHO PV PEAK GRADIENT: 1 MMHG
ECHO PV VTI: 13.1 CM
ECHO RA AREA 4C: 9.4 CM2
ECHO RA END SYSTOLIC VOLUME APICAL 4 CHAMBER INDEX BSA: 8 ML/M2
ECHO RA VOLUME: 17 ML
ECHO RV BASAL DIMENSION: 2.9 CM
ECHO RV FREE WALL PEAK S': 10 CM/S
ECHO RV LONGITUDINAL DIMENSION: 6.2 CM
ECHO RV MID DIMENSION: 2.4 CM
ECHO RV TAPSE: 1.9 CM (ref 1.7–?)

## 2024-05-21 PROCEDURE — 93306 TTE W/DOPPLER COMPLETE: CPT | Performed by: INTERNAL MEDICINE

## 2024-05-21 PROCEDURE — 93306 TTE W/DOPPLER COMPLETE: CPT

## 2024-05-22 ENCOUNTER — TELEPHONE (OUTPATIENT)
Dept: CARDIOLOGY CLINIC | Age: 54
End: 2024-05-22

## 2024-05-22 NOTE — TELEPHONE ENCOUNTER
----- Message from Keyon Gresham MD sent at 5/21/2024  4:41 PM EDT -----  Please inform patient echocardiogram showed low LV function.  Continue current regimen and keep FU as scheduled.   ----- Message -----  From: Vic Lin MD  Sent: 5/21/2024  12:06 PM EDT  To: Keyon Gresham MD

## 2024-05-23 ENCOUNTER — TELEPHONE (OUTPATIENT)
Dept: BARIATRICS/WEIGHT MGMT | Age: 54
End: 2024-05-23

## 2024-05-23 NOTE — DISCHARGE INSTRUCTIONS
ENDOSCOPY DISCHARGE INSTRUCTIONS    You may experience some lightheadedness for the next several hours.  Plan on quiet relaxation for the rest of today.  A responsible adult needs to stay with you today.  Because of the medications you received today-do not drive,operate machinery,or sign any contractual agreement for the next 24 hours.  Do not drink any alcoholic beverages or take any unprescribed medications tonight.  Eat bland food and avoid anything greasy or spicy initially-progress to your normal diet gradually.  Diet restrictions as instructed.  If you have any of the following problems, notify your physician or return to the hospital emergency room : fever, chills, excessive bleeding, excessive vomiting, difficulty swallowing, uncontrolled pain, increased abdominal distention, shortness of breath or any other problems.  If you have a sore throat, you may use lozenges or salt water gargles.  Please call Dr. Kelly's office in 5 business days for biopsy results [phone].      ANESTHESIA DISCHARGE INSTRUCTIONS    Wear your seatbelt home.  You are under the influence of drugs-do not drink alcohol,drive,operate machinery,or make any important decisions or sign any legal documentsfor 24 hours  Children should not ride bikes,skate boards or play on gym sets  for 24 hours after surgery.  A responsible adult needs to be with you for 24 hours.  You may experience lightheadedness,dizziness,or sleepiness following surgery.  Rest at home today- increase activity as tolerated.  Progress slowly to a regular diet unless your physician has instructed you otherwise.Drink plenty of water.  If nausea becomes a problem call your physician.  Coughing,sore throat,and muscle aches are other side effects of anesthesia,and should improve with time.  Do not drive,operate machinery while taking narcotics.

## 2024-05-23 NOTE — TELEPHONE ENCOUNTER
Pt is having EGD done tomorrow and said she ate a little bit of cottage cheese and wants to know if still able to do EGD before she comes out to [place].

## 2024-05-23 NOTE — TELEPHONE ENCOUNTER
Spoke with pt- had 2 spoonfuls of cottage cheese today. Asked Dr. Kelly if pt was okay to keep egd for tomorrow- Dr. Kelly verbalized it would be okay. Reiterated to pt.

## 2024-05-24 ENCOUNTER — ANESTHESIA EVENT (OUTPATIENT)
Dept: ENDOSCOPY | Age: 54
End: 2024-05-24
Payer: COMMERCIAL

## 2024-05-24 ENCOUNTER — ANESTHESIA (OUTPATIENT)
Dept: ENDOSCOPY | Age: 54
End: 2024-05-24
Payer: COMMERCIAL

## 2024-05-24 ENCOUNTER — HOSPITAL ENCOUNTER (OUTPATIENT)
Age: 54
Setting detail: OUTPATIENT SURGERY
Discharge: HOME | End: 2024-05-24
Attending: SURGERY | Admitting: SURGERY
Payer: COMMERCIAL

## 2024-05-24 VITALS
RESPIRATION RATE: 19 BRPM | BODY MASS INDEX: 35.57 KG/M2 | HEART RATE: 79 BPM | DIASTOLIC BLOOD PRESSURE: 70 MMHG | HEIGHT: 66 IN | OXYGEN SATURATION: 95 % | WEIGHT: 221.3 LBS | SYSTOLIC BLOOD PRESSURE: 110 MMHG | TEMPERATURE: 97.6 F

## 2024-05-24 DIAGNOSIS — K21.9 CHRONIC GERD: ICD-10-CM

## 2024-05-24 PROCEDURE — 2709999900 HC NON-CHARGEABLE SUPPLY: Performed by: SURGERY

## 2024-05-24 PROCEDURE — 2500000003 HC RX 250 WO HCPCS: Performed by: NURSE ANESTHETIST, CERTIFIED REGISTERED

## 2024-05-24 PROCEDURE — 6360000002 HC RX W HCPCS: Performed by: NURSE ANESTHETIST, CERTIFIED REGISTERED

## 2024-05-24 PROCEDURE — 7100000010 HC PHASE II RECOVERY - FIRST 15 MIN: Performed by: SURGERY

## 2024-05-24 PROCEDURE — 3700000000 HC ANESTHESIA ATTENDED CARE: Performed by: SURGERY

## 2024-05-24 PROCEDURE — 43239 EGD BIOPSY SINGLE/MULTIPLE: CPT | Performed by: SURGERY

## 2024-05-24 PROCEDURE — 88305 TISSUE EXAM BY PATHOLOGIST: CPT

## 2024-05-24 PROCEDURE — 7100000001 HC PACU RECOVERY - ADDTL 15 MIN: Performed by: SURGERY

## 2024-05-24 PROCEDURE — 7100000011 HC PHASE II RECOVERY - ADDTL 15 MIN: Performed by: SURGERY

## 2024-05-24 PROCEDURE — 3609012400 HC EGD TRANSORAL BIOPSY SINGLE/MULTIPLE: Performed by: SURGERY

## 2024-05-24 PROCEDURE — 2580000003 HC RX 258: Performed by: SURGERY

## 2024-05-24 PROCEDURE — 7100000000 HC PACU RECOVERY - FIRST 15 MIN: Performed by: SURGERY

## 2024-05-24 RX ORDER — PROPOFOL 10 MG/ML
INJECTION, EMULSION INTRAVENOUS PRN
Status: DISCONTINUED | OUTPATIENT
Start: 2024-05-24 | End: 2024-05-24 | Stop reason: SDUPTHER

## 2024-05-24 RX ORDER — SODIUM CHLORIDE 9 MG/ML
25 INJECTION, SOLUTION INTRAVENOUS PRN
Status: DISCONTINUED | OUTPATIENT
Start: 2024-05-24 | End: 2024-05-24 | Stop reason: HOSPADM

## 2024-05-24 RX ORDER — SODIUM CHLORIDE 0.9 % (FLUSH) 0.9 %
5-40 SYRINGE (ML) INJECTION EVERY 12 HOURS SCHEDULED
Status: DISCONTINUED | OUTPATIENT
Start: 2024-05-24 | End: 2024-05-24 | Stop reason: HOSPADM

## 2024-05-24 RX ORDER — LIDOCAINE HYDROCHLORIDE 20 MG/ML
INJECTION, SOLUTION EPIDURAL; INFILTRATION; INTRACAUDAL; PERINEURAL PRN
Status: DISCONTINUED | OUTPATIENT
Start: 2024-05-24 | End: 2024-05-24 | Stop reason: SDUPTHER

## 2024-05-24 RX ORDER — OMEPRAZOLE 20 MG/1
20 CAPSULE, DELAYED RELEASE ORAL
Qty: 90 CAPSULE | Refills: 1 | Status: SHIPPED | OUTPATIENT
Start: 2024-05-24

## 2024-05-24 RX ORDER — SODIUM CHLORIDE 0.9 % (FLUSH) 0.9 %
5-40 SYRINGE (ML) INJECTION PRN
Status: DISCONTINUED | OUTPATIENT
Start: 2024-05-24 | End: 2024-05-24 | Stop reason: HOSPADM

## 2024-05-24 RX ADMIN — SODIUM CHLORIDE 25 ML: 9 INJECTION, SOLUTION INTRAVENOUS at 06:04

## 2024-05-24 RX ADMIN — LIDOCAINE HYDROCHLORIDE 100 MG: 20 INJECTION, SOLUTION EPIDURAL; INFILTRATION; INTRACAUDAL; PERINEURAL at 08:09

## 2024-05-24 RX ADMIN — PROPOFOL 100 MG: 10 INJECTION, EMULSION INTRAVENOUS at 08:09

## 2024-05-24 RX ADMIN — PROPOFOL 50 MG: 10 INJECTION, EMULSION INTRAVENOUS at 08:11

## 2024-05-24 RX ADMIN — PROPOFOL 50 MG: 10 INJECTION, EMULSION INTRAVENOUS at 08:12

## 2024-05-24 ASSESSMENT — LIFESTYLE VARIABLES: SMOKING_STATUS: 0

## 2024-05-24 ASSESSMENT — PAIN SCALES - GENERAL: PAINLEVEL_OUTOF10: 0

## 2024-05-24 NOTE — PROGRESS NOTES
Reviewed patient's medical and surgical history in electronic record and with patient at the bedside. All questions regarding procedure answered.     Scope number and equipment verified using a two person system. Family in waiting room.    Electronically signed by Lois Novoa RN on 5/24/2024 at 8:08 AM

## 2024-05-24 NOTE — H&P
Department of General Surgery - Adult Surgical Service   [facility]   [employer]  Attending Pre-operative History and Physical      DIAGNOSIS:  Obesity    INDICATION:  Pre-op    PROCEDURE:  EGD    CHIEF COMPLAINT:  Obesity    History Obtained From:  patient    HISTORY OF PRESENT ILLNESS:    The patient is a 53 y.o. female with significant past medical history of   Patient Active Problem List   Diagnosis    Moderate episode of recurrent major depressive disorder (HCC)    Severe obesity (BMI 35.0-39.9) with comorbidity (HCC)    Elevated low density lipoprotein (LDL) cholesterol level    Chronic GERD    H/O cardiac radiofrequency ablation      who presents for pre-op EGD    Past Medical History:        Diagnosis Date    Anxiety     Back pain     Depression     Gastroenteritis     SVT (supraventricular tachycardia) (HCC)      Past Surgical History:        Procedure Laterality Date    CARDIAC ELECTROPHYSIOLOGY MAPPING AND ABLATION  2023     SECTION      FACIAL SURGERY N/A 2023    EXCISION OF SCALP LESION, POSSIBLE ADJACENT TISSUE TRANSFER performed by Carolyn Leon MD at [facility] OR    HYSTERECTOMY (CERVIX STATUS UNKNOWN)      HYSTERECTOMY, TOTAL ABDOMINAL (CERVIX REMOVED)      HYSTERECTOMY, VAGINAL       Medications Prior to Admission:   Medications Prior to Admission: vitamin D (CHOLECALCIFEROL) 99873 UNIT CAPS, Take 1 capsule by mouth once a week  Cholecalciferol 50 MCG (2000 UT) TABS, Take 1 tablet by mouth daily Take 1 tablet by mouth daily. Start this medication after you finish the weekly regimen (Patient not taking: Reported on 2024)  venlafaxine (EFFEXOR XR) 75 MG extended release capsule, TAKE 1 CAPSULE BY MOUTH EVERY DAY  ferrous gluconate (FERGON) 324 (38 Fe) MG tablet, TAKE 1 TABLET BY MOUTH 2 TIMES A DAY FOR ANEMIA  ibuprofen (ADVIL;MOTRIN) 800 MG tablet, TAKE 1 TABLET BY MOUTH 4 TIMES A DAY AS NEEDED for pain FOR up to 7 DAYS    Allergies:  Patient    Pulmonary/Chest: Effort normal. No accessory muscle usage or stridor. No apnea. No respiratory distress.   Cardiovascular: Normal rate and no JVD.   Abdominal: Normal appearance. Patient exhibits no distension.   Musculoskeletal: Normal range of motion. Patient exhibits no edema.   Neurological: Patient is alert and oriented to person, place, and time. Patient has normal strength. GCS eye subscore is 4. GCS verbal subscore is 5. GCS motor subscore is 6.   Skin: Skin is warm and dry. No abrasion and no rash noted. Patient is not diaphoretic. No cyanosis or erythema.   Psychiatric: Patient has a normal mood and affect. Speech is normal and behavior is normal. Cognition and memory are normal.       DATA:  CBC:   Lab Results   Component Value Date/Time    WBC 5.7 04/08/2024 10:43 AM    RBC 5.40 04/08/2024 10:43 AM    HGB 16.1 04/08/2024 10:43 AM    HCT 48.3 04/08/2024 10:43 AM    MCV 89.5 04/08/2024 10:43 AM    MCH 29.8 04/08/2024 10:43 AM    MCHC 33.3 04/08/2024 10:43 AM    RDW 13.9 04/08/2024 10:43 AM     04/08/2024 10:43 AM    MPV 9.9 04/08/2024 10:43 AM     CMP:    Lab Results   Component Value Date/Time     04/08/2024 10:43 AM    K 4.3 04/08/2024 10:43 AM    K 4.6 08/19/2023 06:55 PM    CL 99 04/08/2024 10:43 AM    CO2 25 04/08/2024 10:43 AM    BUN 10 04/08/2024 10:43 AM    CREATININE 0.6 04/08/2024 10:43 AM    GFRAA >60 05/21/2014 09:00 PM    AGRATIO 1.5 04/08/2024 10:43 AM    LABGLOM >90 04/08/2024 10:43 AM    GLUCOSE 89 04/08/2024 10:43 AM    CALCIUM 10.0 04/08/2024 10:43 AM    BILITOT 1.1 04/08/2024 10:43 AM    ALKPHOS 94 04/08/2024 10:43 AM    AST 18 04/08/2024 10:43 AM    ALT 18 04/08/2024 10:43 AM       ASSESSMENT AND PLAN:      Obesity: Body mass index is 35.72 kg/m².  [x] Continue to make dietary and lifestyle modifications.  [x] Plan for Future laparoscopic sleeve gastrectomy.  [x] Return for follow-up.      Chronic GERD:   [x] Continue to make dietary and lifestyle modifications.  []

## 2024-05-24 NOTE — ANESTHESIA PRE PROCEDURE
Department of Anesthesiology  Preprocedure Note       Name:  Joanne Cm   Age:  53 y.o.  :  1970                                          MRN:  [id number]         Date:  2024      Surgeon: Surgeon(s):  Delroy Kelly MD    Procedure:     Medications prior to admission:   Prior to Admission medications    Medication Sig Start Date End Date Taking? Authorizing Provider   vitamin D (CHOLECALCIFEROL) 53521 UNIT CAPS Take 1 capsule by mouth once a week 24  Delroy Kelly MD   Cholecalciferol 50 MCG ( UT) TABS Take 1 tablet by mouth daily Take 1 tablet by mouth daily. Start this medication after you finish the weekly regimen  Patient not taking: Reported on 2024   Delroy Kelly MD   venlafaxine (EFFEXOR XR) 75 MG extended release capsule TAKE 1 CAPSULE BY MOUTH EVERY DAY 24   Shiraz Randall DO   ferrous gluconate (FERGON) 324 (38 Fe) MG tablet TAKE 1 TABLET BY MOUTH 2 TIMES A DAY FOR ANEMIA 24   Shiraz Randall DO   ibuprofen (ADVIL;MOTRIN) 800 MG tablet TAKE 1 TABLET BY MOUTH 4 TIMES A DAY AS NEEDED for pain FOR up to 7 DAYS 24   Provider, MD Romeo       Current medications:    Current Facility-Administered Medications   Medication Dose Route Frequency Provider Last Rate Last Admin   • sodium chloride flush 0.9 % injection 5-40 mL  5-40 mL IntraVENous 2 times per day Delroy Kelly MD       • sodium chloride flush 0.9 % injection 5-40 mL  5-40 mL IntraVENous PRN Delroy Kelly MD       • 0.9 % sodium chloride infusion  25 mL IntraVENous PRN Delroy Kelly  mL/hr at 24 0604 25 mL at 24 0604       Allergies:  No Known Allergies    Problem List:    Patient Active Problem List   Diagnosis Code   • Moderate episode of recurrent major depressive disorder (HCC) F33.1   • Severe obesity (BMI 35.0-39.9) with comorbidity (HCC) E66.01   • Elevated low density lipoprotein (LDL) cholesterol level E78.00   • Chronic GERD K21.9   • H/O cardiac

## 2024-05-24 NOTE — PROGRESS NOTES
Pt arrived from ENDO to PACU bay 8. Report received from ENDO staff. Pt not arousable to voice.  Pt on 6L POMS mask, NSR, and VSS. Will continue to monitor.

## 2024-05-24 NOTE — PROGRESS NOTES
.Discharge instructions review with patient. All home medications have been reviewed, pt v/u. Discharge instructions signed. Pt discharged to [facility] room with kiana to await lyft ride. Pt discharged with all belongings. Kiana Camp taking stable pt home.

## 2024-05-24 NOTE — ANESTHESIA POSTPROCEDURE EVALUATION
Department of Anesthesiology  Postprocedure Note    Patient: Joanne Cm  MRN: [id number]  YOB: 1970  Date of evaluation: 5/24/2024    Procedure Summary       Date: 05/24/24 Room / Location: [place] [address] / [facility]    Anesthesia Start: 0806 Anesthesia Stop: 0819    Procedure: ESOPHAGOGASTRODUODENOSCOPY BIOPSY (Abdomen) Diagnosis:       Chronic GERD      (Chronic GERD [K21.9])    Surgeons: Delroy Kelly MD Responsible Provider: Conrado Hare MD    Anesthesia Type: MAC ASA Status: 3            Anesthesia Type: No value filed.    Juan Jose Phase I: Juan Jose Score: 10    Juan Jose Phase II: Juan Jose Score: 10    Anesthesia Post Evaluation    Patient location during evaluation: PACU  Patient participation: complete - patient participated  Level of consciousness: awake and alert  Airway patency: patent  Nausea & Vomiting: no nausea and no vomiting  Cardiovascular status: blood pressure returned to baseline  Respiratory status: acceptable  Hydration status: euvolemic  Multimodal analgesia pain management approach  Pain management: adequate    No notable events documented.

## 2024-06-18 NOTE — PROGRESS NOTES
CARDIOLOGY FOLLOW UP        Patient Name: Joanne Cm  Primary Care physician: Shiraz Randall DO    Reason for Referral/Chief Complaint: Joanne Cm is a 53 y.o. patient who is presenting to cardiology clinic today for follow up.     History of Present Illness:   Joanne Cm is a 53 y.o. with a past medical history of anxiety, SVT s/p ablation, depression and obesity.     Patient was seen by Dr. Gutierrez 11/16/23 for SVT. Noted to have been diagnosed with SVT since 2014, and had multiple episodes throughout the year lasting 20 minutes. She had went to the ED 8/19/23 for SVT, and treated with Adenosine. On 12/20/23, she had a successful ablation with Dr. Gutierrez. Most recently seen by Dr. Kelly 3/28/24 for weight loss surgery consultation.     Last office visit, 4/23/24, patient stated she was feeling good. She stated she was looking to have weight loss surgery with Dr. Kelly in September. Patient stated prior to her ablation with Dr. Gutierrez she was very fatigued and SOB, but this had improved. Patient stated she was looking to have surgery to be able to be more active with her grandchildren. Patient denied tobacco use. Patient denied illicit drug use. Patient stated she drinks socially. Family history includes grandfather had CHF. The patient denied chest pain, shortness of breath at rest and dyspnea with exertion. Denied palpitations, dizziness, near-syncope or christina syncope. Denied paroxysmal nocturnal dyspnea, orthopnea, bendopnea, increasing lower extremity edema or weight gain.  The patient endorsed highest level of activity as walking.    Today, 6/21/24, states she is doing well.  She states she is still planning to have weight loss surgery in the near future. She states she has one more pre-op appointment and a support group meeting and then she will be scheduled for her surgery.  Patient denies current edema, chest pain, shortness of breath, palpitations, dizziness or syncope.  We reviewed her

## 2024-06-20 ENCOUNTER — HOSPITAL ENCOUNTER (OUTPATIENT)
Dept: GENERAL RADIOLOGY | Age: 54
Discharge: HOME | End: 2024-06-20
Payer: COMMERCIAL

## 2024-06-20 ENCOUNTER — OFFICE VISIT (OUTPATIENT)
Dept: PRIMARY CARE CLINIC | Age: 54
End: 2024-06-20
Payer: COMMERCIAL

## 2024-06-20 VITALS
BODY MASS INDEX: 41.04 KG/M2 | TEMPERATURE: 97.5 F | WEIGHT: 223 LBS | DIASTOLIC BLOOD PRESSURE: 83 MMHG | SYSTOLIC BLOOD PRESSURE: 132 MMHG | HEART RATE: 86 BPM | HEIGHT: 62 IN

## 2024-06-20 DIAGNOSIS — M25.561 CHRONIC PAIN OF RIGHT KNEE: ICD-10-CM

## 2024-06-20 DIAGNOSIS — G89.29 CHRONIC PAIN OF RIGHT KNEE: ICD-10-CM

## 2024-06-20 DIAGNOSIS — M25.561 CHRONIC PAIN OF RIGHT KNEE: Primary | ICD-10-CM

## 2024-06-20 DIAGNOSIS — G89.29 CHRONIC PAIN OF RIGHT KNEE: Primary | ICD-10-CM

## 2024-06-20 PROCEDURE — 3017F COLORECTAL CA SCREEN DOC REV: CPT | Performed by: STUDENT IN AN ORGANIZED HEALTH CARE EDUCATION/TRAINING PROGRAM

## 2024-06-20 PROCEDURE — G8427 DOCREV CUR MEDS BY ELIG CLIN: HCPCS | Performed by: STUDENT IN AN ORGANIZED HEALTH CARE EDUCATION/TRAINING PROGRAM

## 2024-06-20 PROCEDURE — 73562 X-RAY EXAM OF KNEE 3: CPT

## 2024-06-20 PROCEDURE — G8417 CALC BMI ABV UP PARAM F/U: HCPCS | Performed by: STUDENT IN AN ORGANIZED HEALTH CARE EDUCATION/TRAINING PROGRAM

## 2024-06-20 PROCEDURE — 1036F TOBACCO NON-USER: CPT | Performed by: STUDENT IN AN ORGANIZED HEALTH CARE EDUCATION/TRAINING PROGRAM

## 2024-06-20 PROCEDURE — 99214 OFFICE O/P EST MOD 30 MIN: CPT | Performed by: STUDENT IN AN ORGANIZED HEALTH CARE EDUCATION/TRAINING PROGRAM

## 2024-06-20 RX ORDER — IBUPROFEN 600 MG/1
600 TABLET ORAL 3 TIMES DAILY PRN
Qty: 90 TABLET | Refills: 2 | Status: SHIPPED | OUTPATIENT
Start: 2024-06-20

## 2024-06-20 SDOH — ECONOMIC STABILITY: FOOD INSECURITY: WITHIN THE PAST 12 MONTHS, YOU WORRIED THAT YOUR FOOD WOULD RUN OUT BEFORE YOU GOT MONEY TO BUY MORE.: NEVER TRUE

## 2024-06-20 SDOH — ECONOMIC STABILITY: FOOD INSECURITY: WITHIN THE PAST 12 MONTHS, THE FOOD YOU BOUGHT JUST DIDN'T LAST AND YOU DIDN'T HAVE MONEY TO GET MORE.: NEVER TRUE

## 2024-06-20 SDOH — ECONOMIC STABILITY: INCOME INSECURITY: HOW HARD IS IT FOR YOU TO PAY FOR THE VERY BASICS LIKE FOOD, HOUSING, MEDICAL CARE, AND HEATING?: NOT HARD AT ALL

## 2024-06-20 ASSESSMENT — ENCOUNTER SYMPTOMS: BACK PAIN: 0

## 2024-06-20 NOTE — PROGRESS NOTES
2024     Joanne Cm (:  1970) is a 53 y.o. female, here for evaluation of the following medical concerns:    HPI  Knee Pain  Patient presents with knee pain involving the right knee. Onset of the symptoms was 1 month ago. Inciting event: none known. Current symptoms include crepitus sensation and stiffness. Pain is aggravated by inactivity, kneeling, and rising after sitting. Patient has had no prior knee problems. Evaluation to date: none. Treatment to date: avoidance of offending activity and OTC analgesics which are somewhat effective.    Review of Systems   Constitutional:  Positive for activity change.   Genitourinary:  Negative for difficulty urinating, flank pain and frequency.   Musculoskeletal:  Positive for arthralgias. Negative for back pain, gait problem, neck pain and neck stiffness.   Neurological:  Negative for weakness and numbness.       Prior to Visit Medications    Medication Sig Taking? Authorizing Provider   ibuprofen (ADVIL;MOTRIN) 600 MG tablet Take 1 tablet by mouth 3 times daily as needed for Pain Yes Shiraz Randall DO   omeprazole (PRILOSEC) 20 MG delayed release capsule Take 1 capsule by mouth every morning (before breakfast) Yes Delroy Kelly MD   vitamin D (CHOLECALCIFEROL) 21358 UNIT CAPS Take 1 capsule by mouth once a week Yes Delroy Kelly MD   Cholecalciferol 50 MCG (2000 UT) TABS Take 1 tablet by mouth daily Take 1 tablet by mouth daily. Start this medication after you finish the weekly regimen Yes Delroy Kelly MD   venlafaxine (EFFEXOR XR) 75 MG extended release capsule TAKE 1 CAPSULE BY MOUTH EVERY DAY Yes Shiraz Randall DO   ferrous gluconate (FERGON) 324 (38 Fe) MG tablet TAKE 1 TABLET BY MOUTH 2 TIMES A DAY FOR ANEMIA Yes Shiraz Randall DO        Social History     Tobacco Use    Smoking status: Never    Smokeless tobacco: Never   Substance Use Topics    Alcohol use: Not Currently     Alcohol/week: 2.0 standard drinks of alcohol     Types: 2 Glasses of

## 2024-06-21 ENCOUNTER — OFFICE VISIT (OUTPATIENT)
Dept: CARDIOLOGY CLINIC | Age: 54
End: 2024-06-21
Payer: COMMERCIAL

## 2024-06-21 ENCOUNTER — TELEPHONE (OUTPATIENT)
Dept: PRIMARY CARE CLINIC | Age: 54
End: 2024-06-21

## 2024-06-21 VITALS
WEIGHT: 221 LBS | OXYGEN SATURATION: 98 % | DIASTOLIC BLOOD PRESSURE: 78 MMHG | HEART RATE: 76 BPM | BODY MASS INDEX: 40.67 KG/M2 | SYSTOLIC BLOOD PRESSURE: 128 MMHG | HEIGHT: 62 IN

## 2024-06-21 DIAGNOSIS — E66.01 MORBID OBESITY (HCC): ICD-10-CM

## 2024-06-21 DIAGNOSIS — Z01.810 PREOP CARDIOVASCULAR EXAM: ICD-10-CM

## 2024-06-21 DIAGNOSIS — Z79.899 MEDICATION MANAGEMENT: Primary | ICD-10-CM

## 2024-06-21 DIAGNOSIS — Z86.79 H/O SUPRAVENTRICULAR TACHYCARDIA: ICD-10-CM

## 2024-06-21 DIAGNOSIS — F32.A DEPRESSION, UNSPECIFIED DEPRESSION TYPE: ICD-10-CM

## 2024-06-21 PROCEDURE — 99214 OFFICE O/P EST MOD 30 MIN: CPT | Performed by: INTERNAL MEDICINE

## 2024-06-21 NOTE — PATIENT INSTRUCTIONS
Plan:  Continue medications as prescribed.   Order fasting lab work: Lipids, TSH, A1C, CMP and CBC 1 week before next office visit.   Follow up with me in 6-9 months.

## 2024-06-21 NOTE — TELEPHONE ENCOUNTER
Called patient to let her know her knee xray came back normal with no findings of anything, patient understands.

## 2024-06-26 ENCOUNTER — CLINICAL DOCUMENTATION (OUTPATIENT)
Dept: BARIATRICS/WEIGHT MGMT | Age: 54
End: 2024-06-26

## 2024-06-26 NOTE — PROGRESS NOTES
Joanne Cm lost 4 lbs over 1 month per pt reported wt 221 lbs. This eval was conducted via phone on 6/26/24 in preparation or their pre-surgical visit on 6/27/24 with Dr. Kelly.       Breakfast: Egg + EM  Snack: None  Lunch: Yogurt + 1/2 turkey sandwich w/ lite perry  Snack: Blueberry muffin  Dinner: Turkey + peas/broccoli + apple slices  Snack: None    Is pt consuming smaller portions?  Generally taking less, measuring occasionally, feeling comfortable     Is pt consuming at least 64 oz of fluids per day?  4 bottles water  + lactaid milk 1 glass/day + unsweet tea or green tea     Is pt consuming carbonated, caffeinated, or sugary beverages?  Avoiding    Has pt sampled Unjury and/or Nectar protein? Likes Strawberry + Vanilla w/ lactaid milk     Has patient attended a support group? Scheduled July 11th    Exercise: Walking to and from work (3 miles round trip) + some evenings     Plan/Recommendations:   - Continue current eating patterns including protein and plants with all meals and snacks  - Try additional protein powder  - Attend Support Group    Handouts: none    Sara Robles RD, LD

## 2024-06-27 ENCOUNTER — OFFICE VISIT (OUTPATIENT)
Dept: BARIATRICS/WEIGHT MGMT | Age: 54
End: 2024-06-27
Payer: COMMERCIAL

## 2024-06-27 VITALS
BODY MASS INDEX: 36.99 KG/M2 | WEIGHT: 222 LBS | OXYGEN SATURATION: 98 % | DIASTOLIC BLOOD PRESSURE: 74 MMHG | HEART RATE: 86 BPM | SYSTOLIC BLOOD PRESSURE: 128 MMHG | RESPIRATION RATE: 18 BRPM | HEIGHT: 65 IN

## 2024-06-27 DIAGNOSIS — E78.00 ELEVATED LOW DENSITY LIPOPROTEIN (LDL) CHOLESTEROL LEVEL: ICD-10-CM

## 2024-06-27 DIAGNOSIS — E66.01 SEVERE OBESITY (BMI 35.0-39.9) WITH COMORBIDITY (HCC): ICD-10-CM

## 2024-06-27 DIAGNOSIS — K21.9 CHRONIC GERD: ICD-10-CM

## 2024-06-27 DIAGNOSIS — K21.9 CHRONIC GERD: Primary | ICD-10-CM

## 2024-06-27 PROCEDURE — G8417 CALC BMI ABV UP PARAM F/U: HCPCS | Performed by: SURGERY

## 2024-06-27 PROCEDURE — 1036F TOBACCO NON-USER: CPT | Performed by: SURGERY

## 2024-06-27 PROCEDURE — G8427 DOCREV CUR MEDS BY ELIG CLIN: HCPCS | Performed by: SURGERY

## 2024-06-27 PROCEDURE — 3017F COLORECTAL CA SCREEN DOC REV: CPT | Performed by: SURGERY

## 2024-06-27 PROCEDURE — 99214 OFFICE O/P EST MOD 30 MIN: CPT | Performed by: SURGERY

## 2024-06-27 NOTE — PROGRESS NOTES
[facility]   [employer]  Delroy Kelly MD, FACS, [facility]  [address], [address]    [place] [address] .  Phone: [phone]  Fax: [phone]          Chief Complaint   Patient presents with    Obesity     4th pre-surg         HPI:     Joanne Cm is a very pleasant 53 y.o. female with Body mass index is 36.94 kg/m². / Chronic Obesity.     Joanne has been struggling for several years now with obesity. Joanne feels the weight is an obstacle to achieve and perform things in daily living as well risk on health.     Patient  is very determined to lose weight and be healthy, and is working towards  surgical weight loss to achieve this goal. Pre-operative clearance and work up pending. Working hard to keep good dietary habits as well level of activity.  Patient denies any nausea, vomiting, fevers, chills, shortness of breath, chest pain, cough, constipation or difficulty urinating.    Pain Assessment   Denies any abdominal pain       Past Medical History:   Diagnosis Date    Anxiety     Back pain     Depression     Gastroenteritis     SVT (supraventricular tachycardia) (HCC)      Past Surgical History:   Procedure Laterality Date    CARDIAC ELECTROPHYSIOLOGY MAPPING AND ABLATION  2023     SECTION      FACIAL SURGERY N/A 2023    EXCISION OF SCALP LESION, POSSIBLE ADJACENT TISSUE TRANSFER performed by Carolyn Leon MD at [facility] OR    HYSTERECTOMY (CERVIX STATUS UNKNOWN)      HYSTERECTOMY, TOTAL ABDOMINAL (CERVIX REMOVED)      HYSTERECTOMY, VAGINAL      UPPER GASTROINTESTINAL ENDOSCOPY N/A 2024    ESOPHAGOGASTRODUODENOSCOPY BIOPSY performed by Delroy Kelly MD at [facility] ENDOSCOPY     Family History   Problem Relation Age of Onset    Heart Disease Mother     No Known Problems Father     Breast Cancer Sister     Breast Cancer Maternal Aunt      Social History     Tobacco Use    Smoking status: Never    Smokeless tobacco: Never   Substance Use

## 2024-06-28 LAB
AMPHETAMINES UR QL SCN>1000 NG/ML: NORMAL
BARBITURATES UR QL SCN>200 NG/ML: NORMAL
BENZODIAZ UR QL SCN>200 NG/ML: NORMAL
CANNABINOIDS UR QL SCN>50 NG/ML: NORMAL
COCAINE UR QL SCN: NORMAL
DRUG SCREEN COMMENT UR-IMP: NORMAL
ETHANOLAMINE SERPL-MCNC: NORMAL MG/DL (ref 0–0.08)
FENTANYL SCREEN, URINE: NORMAL
HCG UR QL: NEGATIVE
METHADONE UR QL SCN>300 NG/ML: NORMAL
OPIATES UR QL SCN>300 NG/ML: NORMAL
OXYCODONE UR QL SCN: NORMAL
PCP UR QL SCN>25 NG/ML: NORMAL
PH UR STRIP: 6 [PH]

## 2024-07-01 LAB
COTININE SERPL-MCNC: <5 NG/ML
NICOTINE SERPL-MCNC: <5 NG/ML

## 2024-07-11 ENCOUNTER — OFFICE VISIT (OUTPATIENT)
Dept: BARIATRICS/WEIGHT MGMT | Age: 54
End: 2024-07-11
Payer: COMMERCIAL

## 2024-07-11 VITALS
OXYGEN SATURATION: 98 % | BODY MASS INDEX: 37.15 KG/M2 | HEIGHT: 65 IN | HEART RATE: 85 BPM | WEIGHT: 223 LBS | DIASTOLIC BLOOD PRESSURE: 82 MMHG | RESPIRATION RATE: 18 BRPM | SYSTOLIC BLOOD PRESSURE: 116 MMHG

## 2024-07-11 DIAGNOSIS — E66.01 SEVERE OBESITY (BMI 35.0-39.9) WITH COMORBIDITY (HCC): Primary | ICD-10-CM

## 2024-07-11 DIAGNOSIS — E78.00 ELEVATED LOW DENSITY LIPOPROTEIN (LDL) CHOLESTEROL LEVEL: ICD-10-CM

## 2024-07-11 DIAGNOSIS — K21.9 CHRONIC GERD: ICD-10-CM

## 2024-07-11 PROCEDURE — G8427 DOCREV CUR MEDS BY ELIG CLIN: HCPCS | Performed by: SURGERY

## 2024-07-11 PROCEDURE — 1036F TOBACCO NON-USER: CPT | Performed by: SURGERY

## 2024-07-11 PROCEDURE — 99214 OFFICE O/P EST MOD 30 MIN: CPT | Performed by: SURGERY

## 2024-07-11 PROCEDURE — G8417 CALC BMI ABV UP PARAM F/U: HCPCS | Performed by: SURGERY

## 2024-07-11 PROCEDURE — 3017F COLORECTAL CA SCREEN DOC REV: CPT | Performed by: SURGERY

## 2024-07-11 NOTE — PROGRESS NOTES
Joanne Cm gained 1 lbs over 2 weeks.    Breakfast: 8:15 AM: 2 scrambled eggs + yogurt w/ granola   Snack: None  Lunch: 1:30 PM: Grapes + salad w/ raisins/almonds/philly/vinaigrette dressing  Snack: None  Dinner: 7 PM: Turkey/cheese sandwich w/ lite perry   Snack: None    Is pt consuming smaller portions?  Generally taking less, measuring occasionally, feeling comfortable       Is pt consuming at least 64 oz of fluids per day?  4 bottles water  + lactaid milk 1 glass/day + unsweet tea or green tea     Is pt consuming carbonated, caffeinated, or sugary beverages?  Avoiding    Has pt sampled Unjury and/or Nectar protein? Likes Strawberry + Vanilla w/ lactaid milk     Has patient attended a support group? Scheduled July 11th    Exercise: Less walking d/t knee pain, has been taking extra trips up stairs     Plan/Recommendations:   - Have planned snack w/ protein daily  - Attend Support Group  - Get back to typical walking routine as able     Handouts: none    Sara Robles RD, LD  
indicated procedures.           Morbid Obesity: Body mass index is 37.11 kg/m².  [x] Continue to make dietary and lifestyle modifications.  [x] Plan for Future laparoscopic sleeve gastrectomy.  [x] Return for follow-up next month.      Chronic GERD:   [x] Continue to make dietary and lifestyle modifications.  [] Continue Omeprazole.  [] Continue Famotidine.  [x] Weight loss Recommended.        Hyperlipidemia:   [x] Continue to make dietary and lifestyle modifications.  [x] Continue to follow up with their PCP for medication management and monitoring.  [x] Weight loss Recommended.        Please note that some or all of this report was generated using voice recognition software. Please notify me in case of any questions about the content of this document, as some errors in transcription may have occurred .

## 2024-07-11 NOTE — PATIENT INSTRUCTIONS
Patient received dietary handouts and education.    Plan/Recommendations:   - Have planned snack w/ protein daily  - Attend Support Group  - Get back to typical walking routine as able

## 2024-07-16 NOTE — PROGRESS NOTES
[facility]   [employer]    Subjective:      Patient ID: Joanne Cm is a 54 y.o. female    HPI    The patient is here for their visit prior to the bariatric surgery preoperative class. She has made several attempts at weight loss in the past without success and now has been scheduled to have bariatric surgery on 2024 for future weight loss. She is working to change her dietary behaviors and lose weight to improve comorbid conditions such as hyperlipidemia and GERD.    Joanne Cm is a very pleasant 54 y.o. female with Body mass index is 37.11 kg/m².    Past Medical History:   Diagnosis Date    Anxiety     Back pain     Depression     Gastroenteritis     SVT (supraventricular tachycardia) (HCC)      Past Surgical History:   Procedure Laterality Date    CARDIAC ELECTROPHYSIOLOGY MAPPING AND ABLATION  2023     SECTION      FACIAL SURGERY N/A 2023    EXCISION OF SCALP LESION, POSSIBLE ADJACENT TISSUE TRANSFER performed by Carolyn Leon MD at [facility] OR    HYSTERECTOMY (CERVIX STATUS UNKNOWN)      HYSTERECTOMY, TOTAL ABDOMINAL (CERVIX REMOVED)      HYSTERECTOMY, VAGINAL      UPPER GASTROINTESTINAL ENDOSCOPY N/A 2024    ESOPHAGOGASTRODUODENOSCOPY BIOPSY performed by Delroy Kelly MD at [facility] ENDOSCOPY     Family History   Problem Relation Age of Onset    Heart Disease Mother     No Known Problems Father     Breast Cancer Sister     Breast Cancer Maternal Aunt      Social History     Tobacco Use    Smoking status: Never    Smokeless tobacco: Never   Substance Use Topics    Alcohol use: Not Currently     Alcohol/week: 2.0 standard drinks of alcohol     Types: 2 Glasses of wine per week     I counseled the patient on the importance of not smoking and risks of ETOH.   No Known Allergies  Vitals:    24 1247   Weight: 101.2 kg (223 lb)   Height: 1.651 m (5' 5\")     Body mass index is 37.11 kg/m².    Current Outpatient Medications:

## 2024-07-21 PROBLEM — Z01.810 PREOP CARDIOVASCULAR EXAM: Status: RESOLVED | Noted: 2024-06-21 | Resolved: 2024-07-21

## 2024-07-24 ENCOUNTER — PREP FOR PROCEDURE (OUTPATIENT)
Dept: BARIATRICS/WEIGHT MGMT | Age: 54
End: 2024-07-24

## 2024-07-24 DIAGNOSIS — E66.01 MORBID OBESITY: Primary | ICD-10-CM

## 2024-07-24 DIAGNOSIS — E66.01 SEVERE OBESITY (BMI 35.0-35.9 WITH COMORBIDITY): ICD-10-CM

## 2024-07-24 RX ORDER — SODIUM CHLORIDE 9 MG/ML
INJECTION, SOLUTION INTRAVENOUS PRN
Status: CANCELLED | OUTPATIENT
Start: 2024-07-24

## 2024-07-24 RX ORDER — SODIUM CHLORIDE 0.9 % (FLUSH) 0.9 %
5-40 SYRINGE (ML) INJECTION PRN
Status: CANCELLED | OUTPATIENT
Start: 2024-07-24

## 2024-07-24 RX ORDER — SODIUM CHLORIDE, SODIUM LACTATE, POTASSIUM CHLORIDE, CALCIUM CHLORIDE 600; 310; 30; 20 MG/100ML; MG/100ML; MG/100ML; MG/100ML
INJECTION, SOLUTION INTRAVENOUS CONTINUOUS
Status: CANCELLED | OUTPATIENT
Start: 2024-07-24

## 2024-07-24 RX ORDER — SODIUM CHLORIDE 0.9 % (FLUSH) 0.9 %
5-40 SYRINGE (ML) INJECTION EVERY 12 HOURS SCHEDULED
Status: CANCELLED | OUTPATIENT
Start: 2024-07-24

## 2024-07-24 RX ORDER — SCOLOPAMINE TRANSDERMAL SYSTEM 1 MG/1
1 PATCH, EXTENDED RELEASE TRANSDERMAL ONCE
Status: CANCELLED | OUTPATIENT
Start: 2024-07-24 | End: 2024-07-24

## 2024-07-24 RX ORDER — ACETAMINOPHEN 160 MG/5ML
650 LIQUID ORAL ONCE
Status: CANCELLED | OUTPATIENT
Start: 2024-07-24 | End: 2024-07-24

## 2024-07-29 NOTE — PROGRESS NOTES
Name_______________________________________Printed:____________________  Date and time of surgery_8/21 0930_______________________Arrival Time:__0730___/per office____   1. The instructions given regarding when and if a patient needs to stop oral intake prior to surgery varies.Follow the specific instructions you were given                  _x__Nothing to eat or to drink after Midnight the night before.                   ____Carbo loading or instructions will be given to select patients-if you have been given those instructions -please do the following                           The evening before your surgery after dinner before midnight drink 40 ounces of gatorade.If you are diabetic use sugar free.  The morning of surgery drink 40 ounces of water.This needs to be finished 3 hours prior to your surgery start time.    2. Take the following pills with a small sip of water on the morning of surgery_____none______________________________________________                  Do not take blood pressure medications ending in pril or sartan the emir prior to surgery or the morning of surgery. Dr Kelly's patient are not to take any medications the AM of surgery.         3. Aspirin, Ibuprofen, Advil, Naproxen, Vitamin E and other Anti-inflammatory products and supplements should be stopped for 5 -7days before surgery or as directed by your physician.   4. Check with your Doctor regarding stopping Plavix, Coumadin,Eliquis, Lovenox,Effient,Pradaxa,Xarelto, Fragmin or other blood thinners and follow their instructions.   5. Do not smoke, and do not drink any alcoholic beverages 24 hours prior to surgery.  This includes NA Beer.Refrain from the usage of any recreational drugs.   6. You may brush your teeth and gargle the morning of surgery.  DO NOT SWALLOW WATER   7. You MUST make arrangements for a responsible adult to stay on site while you are here and take you home after your surgery. You will not be allowed to leave alone or

## 2024-07-30 NOTE — PROGRESS NOTES
Patient Name:   Joanne Cm       Type of Surgery:  Sleeve         Date of Surgery:  8/21/2024       Start Pre-Op Diet On:  8/8/2024       Start Clear Liquids On:  8/20/2024

## 2024-08-06 ENCOUNTER — OFFICE VISIT (OUTPATIENT)
Dept: BARIATRICS/WEIGHT MGMT | Age: 54
End: 2024-08-06
Payer: COMMERCIAL

## 2024-08-06 ENCOUNTER — OFFICE VISIT (OUTPATIENT)
Dept: PRIMARY CARE CLINIC | Age: 54
End: 2024-08-06
Payer: COMMERCIAL

## 2024-08-06 VITALS
BODY MASS INDEX: 35.74 KG/M2 | SYSTOLIC BLOOD PRESSURE: 132 MMHG | DIASTOLIC BLOOD PRESSURE: 85 MMHG | WEIGHT: 222.4 LBS | HEART RATE: 83 BPM | TEMPERATURE: 98.6 F | HEIGHT: 66 IN

## 2024-08-06 VITALS — WEIGHT: 223 LBS | BODY MASS INDEX: 37.15 KG/M2 | HEIGHT: 65 IN

## 2024-08-06 DIAGNOSIS — E66.01 SEVERE OBESITY (BMI 35.0-39.9) WITH COMORBIDITY (HCC): ICD-10-CM

## 2024-08-06 DIAGNOSIS — E66.01 MORBID OBESITY (HCC): ICD-10-CM

## 2024-08-06 DIAGNOSIS — K21.9 CHRONIC GERD: Primary | ICD-10-CM

## 2024-08-06 DIAGNOSIS — E66.01 SEVERE OBESITY (BMI 35.0-35.9 WITH COMORBIDITY) (HCC): ICD-10-CM

## 2024-08-06 DIAGNOSIS — Z01.818 PREOP GENERAL PHYSICAL EXAM: Primary | ICD-10-CM

## 2024-08-06 DIAGNOSIS — E78.00 ELEVATED LOW DENSITY LIPOPROTEIN (LDL) CHOLESTEROL LEVEL: ICD-10-CM

## 2024-08-06 PROCEDURE — 3017F COLORECTAL CA SCREEN DOC REV: CPT | Performed by: NURSE PRACTITIONER

## 2024-08-06 PROCEDURE — G8417 CALC BMI ABV UP PARAM F/U: HCPCS | Performed by: STUDENT IN AN ORGANIZED HEALTH CARE EDUCATION/TRAINING PROGRAM

## 2024-08-06 PROCEDURE — 99214 OFFICE O/P EST MOD 30 MIN: CPT | Performed by: NURSE PRACTITIONER

## 2024-08-06 PROCEDURE — 1036F TOBACCO NON-USER: CPT | Performed by: NURSE PRACTITIONER

## 2024-08-06 PROCEDURE — G8427 DOCREV CUR MEDS BY ELIG CLIN: HCPCS | Performed by: NURSE PRACTITIONER

## 2024-08-06 PROCEDURE — 99214 OFFICE O/P EST MOD 30 MIN: CPT | Performed by: STUDENT IN AN ORGANIZED HEALTH CARE EDUCATION/TRAINING PROGRAM

## 2024-08-06 PROCEDURE — G8417 CALC BMI ABV UP PARAM F/U: HCPCS | Performed by: NURSE PRACTITIONER

## 2024-08-06 PROCEDURE — 3017F COLORECTAL CA SCREEN DOC REV: CPT | Performed by: STUDENT IN AN ORGANIZED HEALTH CARE EDUCATION/TRAINING PROGRAM

## 2024-08-06 PROCEDURE — G8427 DOCREV CUR MEDS BY ELIG CLIN: HCPCS | Performed by: STUDENT IN AN ORGANIZED HEALTH CARE EDUCATION/TRAINING PROGRAM

## 2024-08-06 PROCEDURE — 1036F TOBACCO NON-USER: CPT | Performed by: STUDENT IN AN ORGANIZED HEALTH CARE EDUCATION/TRAINING PROGRAM

## 2024-08-06 SDOH — ECONOMIC STABILITY: FOOD INSECURITY: WITHIN THE PAST 12 MONTHS, THE FOOD YOU BOUGHT JUST DIDN'T LAST AND YOU DIDN'T HAVE MONEY TO GET MORE.: NEVER TRUE

## 2024-08-06 SDOH — ECONOMIC STABILITY: INCOME INSECURITY: HOW HARD IS IT FOR YOU TO PAY FOR THE VERY BASICS LIKE FOOD, HOUSING, MEDICAL CARE, AND HEATING?: NOT HARD AT ALL

## 2024-08-06 SDOH — ECONOMIC STABILITY: FOOD INSECURITY: WITHIN THE PAST 12 MONTHS, YOU WORRIED THAT YOUR FOOD WOULD RUN OUT BEFORE YOU GOT MONEY TO BUY MORE.: NEVER TRUE

## 2024-08-06 ASSESSMENT — ENCOUNTER SYMPTOMS
SHORTNESS OF BREATH: 0
COUGH: 0
CHEST TIGHTNESS: 0

## 2024-08-06 NOTE — PATIENT INSTRUCTIONS
Patient Education        Learning About How to Prepare for Surgery  How can you prepare before surgery?     You can do some things that will help you safely prepare for surgery.  Understand exactly what surgery is planned.   You should know the risks, benefits, and other options.  Tell your doctors ALL the medicines, vitamins, supplements, and herbal remedies you take.   Some of these can increase the risk of bleeding. Or they may interact with anesthesia.  Follow your doctor's instructions about which medicines to take or stop before your surgery.  You may need to stop taking some medicines a week or more before surgery.  If you take aspirin or some other blood thinner, be sure to talk to your doctor.  Follow any other instructions your doctor gave you.  If you have an advance directive, let your doctor know, and bring a copy to the hospital.   It may include a living will and a durable power of  for health care. It lets your doctor and loved ones know your health care wishes. If you don't have one, you may want to prepare one.  How can you prepare on the day of surgery?  Here are some tips about what to do at home before you leave for your surgery.  If your doctor told you to take your medicines on the day of surgery, take them with only a sip of water.  Follow the instructions about when to stop eating and drinking.   If you don't, your surgery may be canceled.  Follow your doctor's instructions about when to bathe or shower before your surgery.  Don't use lotions, perfumes, deodorants, or nail polish.  Do not shave the surgical site yourself.  Take off all jewelry and piercings.  Take out contact lenses, if you wear them.  Have a picture ID ready to take with you.   Your ID will be checked before your surgery.  Know when to call your doctor.   Call your doctor if you:  Become ill before surgery.  Need to reschedule.  Have changed your mind about having the surgery.  What happens before surgery?  Here are

## 2024-08-06 NOTE — PROGRESS NOTES
Preoperative Consultation      Joanne Cm  YOB: 1970    Date of Service:  8/6/2024    Vitals:    08/06/24 0759   BP: 132/85   Pulse: 83   Temp: 98.6 °F (37 °C)   TempSrc: Temporal   Weight: 100.9 kg (222 lb 6.4 oz)   Height: 1.676 m (5' 6\")      Wt Readings from Last 2 Encounters:   08/06/24 100.9 kg (222 lb 6.4 oz)   07/11/24 101.2 kg (223 lb)     BP Readings from Last 3 Encounters:   08/06/24 132/85   07/11/24 116/82   06/27/24 128/74        Chief Complaint   Patient presents with    Pre-op Exam     LAPAROSCOPIC SLEEVE GASTRECTOMY AND OTHER INDICATED PROCEDURES surgery set for 8/21/24 by Delroy cMginnis MD at [facility] OR     No Known Allergies  Outpatient Medications Marked as Taking for the 8/6/24 encounter (Office Visit) with Shiraz Randall,    Medication Sig Dispense Refill    ibuprofen (ADVIL;MOTRIN) 600 MG tablet Take 1 tablet by mouth 3 times daily as needed for Pain 90 tablet 2    omeprazole (PRILOSEC) 20 MG delayed release capsule Take 1 capsule by mouth every morning (before breakfast) 90 capsule 1    venlafaxine (EFFEXOR XR) 75 MG extended release capsule TAKE 1 CAPSULE BY MOUTH EVERY DAY 90 capsule 3    ferrous gluconate (FERGON) 324 (38 Fe) MG tablet TAKE 1 TABLET BY MOUTH 2 TIMES A DAY FOR ANEMIA 180 tablet 0       This patient presents to the office today for a preoperative consultation at the request of surgeon, Dr. Kelly, who plans on performing laparoscopic sleeve gastrectomy on August 21 at [facility].  The current problem began several years ago, and symptoms have been worsening with time.  Conservative therapy: Yes: Self-directed dieting, medically supervised dieting program, which has been ineffective..    Planned anesthesia: General   Known anesthesia problems: None   Bleeding risk: No recent or remote history of abnormal bleeding  Personal or FH of DVT/PE: No    Patient objection to receiving blood products: No    Patient Active Problem List

## 2024-08-07 LAB
ABO + RH BLD: NORMAL
ALBUMIN SERPL-MCNC: 4.3 G/DL (ref 3.4–5)
ALBUMIN/GLOB SERPL: 1.4 {RATIO} (ref 1.1–2.2)
ALP SERPL-CCNC: 127 U/L (ref 40–129)
ALT SERPL-CCNC: 15 U/L (ref 10–40)
ANION GAP SERPL CALCULATED.3IONS-SCNC: 13 MMOL/L (ref 3–16)
AST SERPL-CCNC: 15 U/L (ref 15–37)
BASOPHILS # BLD: 0 K/UL (ref 0–0.2)
BASOPHILS NFR BLD: 0.3 %
BILIRUB SERPL-MCNC: 0.7 MG/DL (ref 0–1)
BLD GP AB SCN SERPL QL: NORMAL
BUN SERPL-MCNC: 11 MG/DL (ref 7–20)
CALCIUM SERPL-MCNC: 9.4 MG/DL (ref 8.3–10.6)
CHLORIDE SERPL-SCNC: 99 MMOL/L (ref 99–110)
CO2 SERPL-SCNC: 26 MMOL/L (ref 21–32)
CREAT SERPL-MCNC: 0.7 MG/DL (ref 0.6–1.1)
DEPRECATED RDW RBC AUTO: 14 % (ref 12.4–15.4)
EOSINOPHIL # BLD: 0.2 K/UL (ref 0–0.6)
EOSINOPHIL NFR BLD: 2.3 %
GFR SERPLBLD CREATININE-BSD FMLA CKD-EPI: >90 ML/MIN/{1.73_M2}
GLUCOSE SERPL-MCNC: 92 MG/DL (ref 70–99)
HCT VFR BLD AUTO: 46.8 % (ref 36–48)
HGB BLD-MCNC: 15.6 G/DL (ref 12–16)
LYMPHOCYTES # BLD: 1.7 K/UL (ref 1–5.1)
LYMPHOCYTES NFR BLD: 24.9 %
MCH RBC QN AUTO: 30 PG (ref 26–34)
MCHC RBC AUTO-ENTMCNC: 33.3 G/DL (ref 31–36)
MCV RBC AUTO: 90 FL (ref 80–100)
MONOCYTES # BLD: 0.5 K/UL (ref 0–1.3)
MONOCYTES NFR BLD: 6.8 %
NEUTROPHILS # BLD: 4.5 K/UL (ref 1.7–7.7)
NEUTROPHILS NFR BLD: 65.7 %
PLATELET # BLD AUTO: 212 K/UL (ref 135–450)
PMV BLD AUTO: 10 FL (ref 5–10.5)
POTASSIUM SERPL-SCNC: 4.3 MMOL/L (ref 3.5–5.1)
PROT SERPL-MCNC: 7.4 G/DL (ref 6.4–8.2)
RBC # BLD AUTO: 5.19 M/UL (ref 4–5.2)
SODIUM SERPL-SCNC: 138 MMOL/L (ref 136–145)
WBC # BLD AUTO: 6.9 K/UL (ref 4–11)

## 2024-08-07 NOTE — TELEPHONE ENCOUNTER
Pt said she called on Thursday about her iron pills and didn't hear back from anyone. The pharmacy no  Longer carries what was sent in . She said she cant take the brown iron pill they carry because it makes her nauseated. Is there anything else she can take? with -ion endin%      Reviewing rule of when to use ir vs. Ur. Spelling r-controlled words with , improving to   []Met  []Partially met  [] Not met   Goal 5: Pt will answer comprehension questions about a 8th grade passage with 80% accuracy.  GOAL MET    86% [x]Met  []Partially met  [] Not met   Pt will correctly read Sang's eighth hundred sight words with 90% accuracy.   GOAL MET    Reading remaining eight hundred words 100%.  [x]Met  []Partially met  [] Not met   Pt will read 9th grade level passage (1200+ Lexile) with >90% accuracy. New goal        Timeframe for Long Term Goals: 6 months         Long-term Goal(s): Current Progress Current Progress   Goal 1: Dennis will read a seventh grade passage with 90% accuracy to improve overall reading fluency.   Goal progressing. See STG data  [x]Met  []Partially met  []Not met   Goal 2: Dennis will answer literal and inferential comprehension questions about a seventh grade level passage with 90% accuracy.   Goal progressing. See STG data    []Met  [x]Partially met  [] Not met   Dennis will read a tenth grade passage with 90% accuracy to improve overall reading fluency. New goal      Rehab Potential  [] Excellent  [x] Good   [] Fair   [] Poor    Plan: Pt continues to demonstrate severe deficits in reading fluency, reading comprehension, and spelling skills, resulting in severe difficulty accessing academic material at an age-appropriate level. Based on severity of deficits and rehab potential, this pt is likely to require therapy services lasting greater than one year.      Electronically signed by:    Mily Marte M.S., CCC-SLP     Date:2024    Regulatory Requirements  I have reviewed this plan of care and certify a need for medically necessary rehabilitation services.    Physician Signature:_____________________________________     Date:2024  Please sign and fax to [phone]

## 2024-08-13 ENCOUNTER — TELEPHONE (OUTPATIENT)
Dept: BARIATRICS/WEIGHT MGMT | Age: 54
End: 2024-08-13

## 2024-08-14 ENCOUNTER — TELEPHONE (OUTPATIENT)
Dept: BARIATRICS/WEIGHT MGMT | Age: 54
End: 2024-08-14

## 2024-08-14 NOTE — TELEPHONE ENCOUNTER
Pt is planned sleeve on 8/21/24 and currently on pre-op diet.    Pt was at work during phone call and her pre-op folder was at home. Pt was worried the food items she had been eating/brought with her were not correct. Went over with pt that 3 protein shakes + 6 food items were the food items she should be eating everyday. Pt  HAS BEEN compliant with all food items. Pt just needed reassurance.     Pt also reminded of CLD on 8/20/24, the day before her surgery.

## 2024-08-21 ENCOUNTER — ANESTHESIA (OUTPATIENT)
Dept: OPERATING ROOM | Age: 54
DRG: 403 | End: 2024-08-21
Payer: COMMERCIAL

## 2024-08-21 ENCOUNTER — ANESTHESIA EVENT (OUTPATIENT)
Dept: OPERATING ROOM | Age: 54
DRG: 403 | End: 2024-08-21
Payer: COMMERCIAL

## 2024-08-21 ENCOUNTER — HOSPITAL ENCOUNTER (INPATIENT)
Age: 54
LOS: 1 days | Discharge: HOME | DRG: 403 | End: 2024-08-22
Attending: SURGERY | Admitting: SURGERY
Payer: COMMERCIAL

## 2024-08-21 DIAGNOSIS — Z98.84 S/P LAPAROSCOPIC SLEEVE GASTRECTOMY: Primary | ICD-10-CM

## 2024-08-21 PROBLEM — E66.9 OBESITY (BMI 30-39.9): Status: ACTIVE | Noted: 2024-08-21

## 2024-08-21 LAB
ABO + RH BLD: NORMAL
BLD GP AB SCN SERPL QL: NORMAL
GLUCOSE BLD-MCNC: 84 MG/DL (ref 70–99)
PERFORMED ON: NORMAL

## 2024-08-21 PROCEDURE — 6360000002 HC RX W HCPCS: Performed by: ANESTHESIOLOGY

## 2024-08-21 PROCEDURE — 6370000000 HC RX 637 (ALT 250 FOR IP): Performed by: SURGERY

## 2024-08-21 PROCEDURE — 7100000000 HC PACU RECOVERY - FIRST 15 MIN: Performed by: SURGERY

## 2024-08-21 PROCEDURE — 2709999900 HC NON-CHARGEABLE SUPPLY: Performed by: SURGERY

## 2024-08-21 PROCEDURE — 2580000003 HC RX 258: Performed by: SURGERY

## 2024-08-21 PROCEDURE — 36415 COLL VENOUS BLD VENIPUNCTURE: CPT

## 2024-08-21 PROCEDURE — 94150 VITAL CAPACITY TEST: CPT

## 2024-08-21 PROCEDURE — G0378 HOSPITAL OBSERVATION PER HR: HCPCS

## 2024-08-21 PROCEDURE — 96372 THER/PROPH/DIAG INJ SC/IM: CPT

## 2024-08-21 PROCEDURE — 6360000002 HC RX W HCPCS: Performed by: SURGERY

## 2024-08-21 PROCEDURE — 3700000001 HC ADD 15 MINUTES (ANESTHESIA): Performed by: SURGERY

## 2024-08-21 PROCEDURE — 6360000002 HC RX W HCPCS: Performed by: NURSE ANESTHETIST, CERTIFIED REGISTERED

## 2024-08-21 PROCEDURE — P9045 ALBUMIN (HUMAN), 5%, 250 ML: HCPCS | Performed by: NURSE ANESTHETIST, CERTIFIED REGISTERED

## 2024-08-21 PROCEDURE — 86901 BLOOD TYPING SEROLOGIC RH(D): CPT

## 2024-08-21 PROCEDURE — 43775 LAP SLEEVE GASTRECTOMY: CPT | Performed by: SURGERY

## 2024-08-21 PROCEDURE — 86900 BLOOD TYPING SEROLOGIC ABO: CPT

## 2024-08-21 PROCEDURE — 2500000003 HC RX 250 WO HCPCS: Performed by: NURSE ANESTHETIST, CERTIFIED REGISTERED

## 2024-08-21 PROCEDURE — 86850 RBC ANTIBODY SCREEN: CPT

## 2024-08-21 PROCEDURE — 7100000001 HC PACU RECOVERY - ADDTL 15 MIN: Performed by: SURGERY

## 2024-08-21 PROCEDURE — 88307 TISSUE EXAM BY PATHOLOGIST: CPT

## 2024-08-21 PROCEDURE — 3600000015 HC SURGERY LEVEL 5 ADDTL 15MIN: Performed by: SURGERY

## 2024-08-21 PROCEDURE — 3600000005 HC SURGERY LEVEL 5 BASE: Performed by: SURGERY

## 2024-08-21 PROCEDURE — 3700000000 HC ANESTHESIA ATTENDED CARE: Performed by: SURGERY

## 2024-08-21 PROCEDURE — A4217 STERILE WATER/SALINE, 500 ML: HCPCS | Performed by: SURGERY

## 2024-08-21 PROCEDURE — 1200000000 HC SEMI PRIVATE

## 2024-08-21 PROCEDURE — 2720000010 HC SURG SUPPLY STERILE: Performed by: SURGERY

## 2024-08-21 PROCEDURE — 94761 N-INVAS EAR/PLS OXIMETRY MLT: CPT

## 2024-08-21 PROCEDURE — 0DB64Z3 EXCISION OF STOMACH, PERCUTANEOUS ENDOSCOPIC APPROACH, VERTICAL: ICD-10-PCS | Performed by: SURGERY

## 2024-08-21 RX ORDER — NALOXONE HYDROCHLORIDE 0.4 MG/ML
INJECTION, SOLUTION INTRAMUSCULAR; INTRAVENOUS; SUBCUTANEOUS PRN
Status: DISCONTINUED | OUTPATIENT
Start: 2024-08-21 | End: 2024-08-22

## 2024-08-21 RX ORDER — FENTANYL CITRATE 50 UG/ML
INJECTION, SOLUTION INTRAMUSCULAR; INTRAVENOUS PRN
Status: DISCONTINUED | OUTPATIENT
Start: 2024-08-21 | End: 2024-08-21 | Stop reason: SDUPTHER

## 2024-08-21 RX ORDER — PROCHLORPERAZINE EDISYLATE 5 MG/ML
5 INJECTION INTRAMUSCULAR; INTRAVENOUS
Status: COMPLETED | OUTPATIENT
Start: 2024-08-21 | End: 2024-08-21

## 2024-08-21 RX ORDER — ENOXAPARIN SODIUM 100 MG/ML
30 INJECTION SUBCUTANEOUS EVERY 12 HOURS SCHEDULED
Status: DISCONTINUED | OUTPATIENT
Start: 2024-08-22 | End: 2024-08-22 | Stop reason: HOSPADM

## 2024-08-21 RX ORDER — ONDANSETRON 2 MG/ML
4 INJECTION INTRAMUSCULAR; INTRAVENOUS EVERY 6 HOURS PRN
Status: DISCONTINUED | OUTPATIENT
Start: 2024-08-21 | End: 2024-08-22 | Stop reason: HOSPADM

## 2024-08-21 RX ORDER — ENOXAPARIN SODIUM 100 MG/ML
30 INJECTION SUBCUTANEOUS ONCE
Status: COMPLETED | OUTPATIENT
Start: 2024-08-21 | End: 2024-08-21

## 2024-08-21 RX ORDER — SODIUM CHLORIDE 9 MG/ML
INJECTION, SOLUTION INTRAVENOUS PRN
Status: DISCONTINUED | OUTPATIENT
Start: 2024-08-21 | End: 2024-08-21 | Stop reason: HOSPADM

## 2024-08-21 RX ORDER — HYDRALAZINE HYDROCHLORIDE 20 MG/ML
10 INJECTION INTRAMUSCULAR; INTRAVENOUS
Status: DISCONTINUED | OUTPATIENT
Start: 2024-08-21 | End: 2024-08-21 | Stop reason: HOSPADM

## 2024-08-21 RX ORDER — SODIUM CHLORIDE 0.9 % (FLUSH) 0.9 %
5-40 SYRINGE (ML) INJECTION PRN
Status: DISCONTINUED | OUTPATIENT
Start: 2024-08-21 | End: 2024-08-21 | Stop reason: HOSPADM

## 2024-08-21 RX ORDER — ONDANSETRON 4 MG/1
4 TABLET, ORALLY DISINTEGRATING ORAL EVERY 8 HOURS PRN
Status: DISCONTINUED | OUTPATIENT
Start: 2024-08-21 | End: 2024-08-22 | Stop reason: HOSPADM

## 2024-08-21 RX ORDER — LABETALOL HYDROCHLORIDE 5 MG/ML
10 INJECTION, SOLUTION INTRAVENOUS
Status: DISCONTINUED | OUTPATIENT
Start: 2024-08-21 | End: 2024-08-21 | Stop reason: HOSPADM

## 2024-08-21 RX ORDER — ROCURONIUM BROMIDE 10 MG/ML
INJECTION, SOLUTION INTRAVENOUS PRN
Status: DISCONTINUED | OUTPATIENT
Start: 2024-08-21 | End: 2024-08-21 | Stop reason: SDUPTHER

## 2024-08-21 RX ORDER — ONDANSETRON 2 MG/ML
INJECTION INTRAMUSCULAR; INTRAVENOUS
Status: DISPENSED
Start: 2024-08-21 | End: 2024-08-22

## 2024-08-21 RX ORDER — DEXAMETHASONE SODIUM PHOSPHATE 4 MG/ML
INJECTION, SOLUTION INTRA-ARTICULAR; INTRALESIONAL; INTRAMUSCULAR; INTRAVENOUS; SOFT TISSUE PRN
Status: DISCONTINUED | OUTPATIENT
Start: 2024-08-21 | End: 2024-08-21 | Stop reason: SDUPTHER

## 2024-08-21 RX ORDER — MIDAZOLAM HYDROCHLORIDE 1 MG/ML
INJECTION INTRAMUSCULAR; INTRAVENOUS PRN
Status: DISCONTINUED | OUTPATIENT
Start: 2024-08-21 | End: 2024-08-21 | Stop reason: SDUPTHER

## 2024-08-21 RX ORDER — SCOLOPAMINE TRANSDERMAL SYSTEM 1 MG/1
1 PATCH, EXTENDED RELEASE TRANSDERMAL ONCE
Status: DISCONTINUED | OUTPATIENT
Start: 2024-08-21 | End: 2024-08-21

## 2024-08-21 RX ORDER — LABETALOL HYDROCHLORIDE 5 MG/ML
10 INJECTION, SOLUTION INTRAVENOUS
Status: DISCONTINUED | OUTPATIENT
Start: 2024-08-21 | End: 2024-08-22 | Stop reason: HOSPADM

## 2024-08-21 RX ORDER — SODIUM CHLORIDE, SODIUM LACTATE, POTASSIUM CHLORIDE, CALCIUM CHLORIDE 600; 310; 30; 20 MG/100ML; MG/100ML; MG/100ML; MG/100ML
INJECTION, SOLUTION INTRAVENOUS CONTINUOUS
Status: DISCONTINUED | OUTPATIENT
Start: 2024-08-21 | End: 2024-08-22

## 2024-08-21 RX ORDER — METOCLOPRAMIDE HYDROCHLORIDE 5 MG/ML
10 INJECTION INTRAMUSCULAR; INTRAVENOUS EVERY 6 HOURS PRN
Status: DISCONTINUED | OUTPATIENT
Start: 2024-08-21 | End: 2024-08-22 | Stop reason: HOSPADM

## 2024-08-21 RX ORDER — PROPOFOL 10 MG/ML
INJECTION, EMULSION INTRAVENOUS PRN
Status: DISCONTINUED | OUTPATIENT
Start: 2024-08-21 | End: 2024-08-21 | Stop reason: SDUPTHER

## 2024-08-21 RX ORDER — HYDROMORPHONE HYDROCHLORIDE 2 MG/ML
0.5 INJECTION, SOLUTION INTRAMUSCULAR; INTRAVENOUS; SUBCUTANEOUS EVERY 5 MIN PRN
Status: DISCONTINUED | OUTPATIENT
Start: 2024-08-21 | End: 2024-08-21 | Stop reason: HOSPADM

## 2024-08-21 RX ORDER — LIDOCAINE 4 G/G
PATCH TOPICAL
Status: DISPENSED
Start: 2024-08-21 | End: 2024-08-22

## 2024-08-21 RX ORDER — LIDOCAINE HYDROCHLORIDE 10 MG/ML
1 INJECTION, SOLUTION EPIDURAL; INFILTRATION; INTRACAUDAL; PERINEURAL
Status: DISCONTINUED | OUTPATIENT
Start: 2024-08-21 | End: 2024-08-21 | Stop reason: HOSPADM

## 2024-08-21 RX ORDER — SODIUM CHLORIDE, SODIUM LACTATE, POTASSIUM CHLORIDE, CALCIUM CHLORIDE 600; 310; 30; 20 MG/100ML; MG/100ML; MG/100ML; MG/100ML
INJECTION, SOLUTION INTRAVENOUS CONTINUOUS
Status: DISCONTINUED | OUTPATIENT
Start: 2024-08-21 | End: 2024-08-21 | Stop reason: HOSPADM

## 2024-08-21 RX ORDER — OXYCODONE HYDROCHLORIDE 5 MG/1
5 TABLET ORAL
Status: DISCONTINUED | OUTPATIENT
Start: 2024-08-21 | End: 2024-08-21 | Stop reason: HOSPADM

## 2024-08-21 RX ORDER — FENTANYL CITRATE 50 UG/ML
25 INJECTION, SOLUTION INTRAMUSCULAR; INTRAVENOUS EVERY 5 MIN PRN
Status: DISCONTINUED | OUTPATIENT
Start: 2024-08-21 | End: 2024-08-21 | Stop reason: HOSPADM

## 2024-08-21 RX ORDER — SODIUM CHLORIDE 0.9 % (FLUSH) 0.9 %
5-40 SYRINGE (ML) INJECTION EVERY 12 HOURS SCHEDULED
Status: DISCONTINUED | OUTPATIENT
Start: 2024-08-21 | End: 2024-08-21 | Stop reason: HOSPADM

## 2024-08-21 RX ORDER — ALBUMIN, HUMAN INJ 5% 5 %
SOLUTION INTRAVENOUS PRN
Status: DISCONTINUED | OUTPATIENT
Start: 2024-08-21 | End: 2024-08-21 | Stop reason: SDUPTHER

## 2024-08-21 RX ORDER — LIDOCAINE 4 G/G
1 PATCH TOPICAL DAILY
Status: DISCONTINUED | OUTPATIENT
Start: 2024-08-21 | End: 2024-08-22 | Stop reason: HOSPADM

## 2024-08-21 RX ORDER — PANTOPRAZOLE SODIUM 40 MG/10ML
INJECTION, POWDER, LYOPHILIZED, FOR SOLUTION INTRAVENOUS
Status: DISPENSED
Start: 2024-08-21 | End: 2024-08-22

## 2024-08-21 RX ORDER — ONDANSETRON 2 MG/ML
INJECTION INTRAMUSCULAR; INTRAVENOUS PRN
Status: DISCONTINUED | OUTPATIENT
Start: 2024-08-21 | End: 2024-08-21 | Stop reason: SDUPTHER

## 2024-08-21 RX ORDER — HYOSCYAMINE SULFATE 0.5 MG/ML
250 INJECTION, SOLUTION SUBCUTANEOUS EVERY 4 HOURS PRN
Status: DISCONTINUED | OUTPATIENT
Start: 2024-08-21 | End: 2024-08-22 | Stop reason: HOSPADM

## 2024-08-21 RX ORDER — ONDANSETRON 2 MG/ML
4 INJECTION INTRAMUSCULAR; INTRAVENOUS
Status: COMPLETED | OUTPATIENT
Start: 2024-08-21 | End: 2024-08-21

## 2024-08-21 RX ORDER — MAGNESIUM HYDROXIDE 1200 MG/15ML
LIQUID ORAL CONTINUOUS PRN
Status: COMPLETED | OUTPATIENT
Start: 2024-08-21 | End: 2024-08-21

## 2024-08-21 RX ORDER — HYDROMORPHONE HYDROCHLORIDE 1 MG/ML
0.5 INJECTION, SOLUTION INTRAMUSCULAR; INTRAVENOUS; SUBCUTANEOUS
Status: DISCONTINUED | OUTPATIENT
Start: 2024-08-21 | End: 2024-08-22 | Stop reason: HOSPADM

## 2024-08-21 RX ORDER — DIPHENHYDRAMINE HYDROCHLORIDE 50 MG/ML
12.5 INJECTION INTRAMUSCULAR; INTRAVENOUS EVERY 6 HOURS PRN
Status: DISCONTINUED | OUTPATIENT
Start: 2024-08-21 | End: 2024-08-22 | Stop reason: HOSPADM

## 2024-08-21 RX ORDER — BUPIVACAINE HYDROCHLORIDE 2.5 MG/ML
INJECTION, SOLUTION EPIDURAL; INFILTRATION; INTRACAUDAL
Status: COMPLETED | OUTPATIENT
Start: 2024-08-21 | End: 2024-08-21

## 2024-08-21 RX ORDER — HYDRALAZINE HYDROCHLORIDE 20 MG/ML
10 INJECTION INTRAMUSCULAR; INTRAVENOUS
Status: DISCONTINUED | OUTPATIENT
Start: 2024-08-21 | End: 2024-08-22 | Stop reason: HOSPADM

## 2024-08-21 RX ORDER — PROMETHAZINE HYDROCHLORIDE 25 MG/ML
6.25 INJECTION, SOLUTION INTRAMUSCULAR; INTRAVENOUS EVERY 6 HOURS PRN
Status: DISCONTINUED | OUTPATIENT
Start: 2024-08-21 | End: 2024-08-22 | Stop reason: HOSPADM

## 2024-08-21 RX ORDER — LIDOCAINE HYDROCHLORIDE 20 MG/ML
INJECTION, SOLUTION EPIDURAL; INFILTRATION; INTRACAUDAL; PERINEURAL PRN
Status: DISCONTINUED | OUTPATIENT
Start: 2024-08-21 | End: 2024-08-21 | Stop reason: SDUPTHER

## 2024-08-21 RX ORDER — SODIUM CHLORIDE 9 MG/ML
INJECTION, SOLUTION INTRAVENOUS PRN
Status: DISCONTINUED | OUTPATIENT
Start: 2024-08-21 | End: 2024-08-22 | Stop reason: HOSPADM

## 2024-08-21 RX ORDER — SCOLOPAMINE TRANSDERMAL SYSTEM 1 MG/1
1 PATCH, EXTENDED RELEASE TRANSDERMAL
Status: DISCONTINUED | OUTPATIENT
Start: 2024-08-24 | End: 2024-08-22 | Stop reason: HOSPADM

## 2024-08-21 RX ORDER — ACETAMINOPHEN 160 MG/5ML
650 LIQUID ORAL ONCE
Status: COMPLETED | OUTPATIENT
Start: 2024-08-21 | End: 2024-08-21

## 2024-08-21 RX ORDER — SODIUM CHLORIDE 0.9 % (FLUSH) 0.9 %
5-40 SYRINGE (ML) INJECTION EVERY 12 HOURS SCHEDULED
Status: DISCONTINUED | OUTPATIENT
Start: 2024-08-21 | End: 2024-08-22 | Stop reason: HOSPADM

## 2024-08-21 RX ORDER — SODIUM CHLORIDE 0.9 % (FLUSH) 0.9 %
5-40 SYRINGE (ML) INJECTION PRN
Status: DISCONTINUED | OUTPATIENT
Start: 2024-08-21 | End: 2024-08-22 | Stop reason: HOSPADM

## 2024-08-21 RX ADMIN — MIDAZOLAM 2 MG: 1 INJECTION INTRAMUSCULAR; INTRAVENOUS at 10:06

## 2024-08-21 RX ADMIN — ROCURONIUM BROMIDE 20 MG: 10 INJECTION, SOLUTION INTRAVENOUS at 11:10

## 2024-08-21 RX ADMIN — SUGAMMADEX 200 MG: 100 INJECTION, SOLUTION INTRAVENOUS at 11:34

## 2024-08-21 RX ADMIN — FENTANYL CITRATE 50 MCG: 50 INJECTION, SOLUTION INTRAMUSCULAR; INTRAVENOUS at 11:03

## 2024-08-21 RX ADMIN — SODIUM CHLORIDE, POTASSIUM CHLORIDE, SODIUM LACTATE AND CALCIUM CHLORIDE: 600; 310; 30; 20 INJECTION, SOLUTION INTRAVENOUS at 11:50

## 2024-08-21 RX ADMIN — SODIUM CHLORIDE, POTASSIUM CHLORIDE, SODIUM LACTATE AND CALCIUM CHLORIDE: 600; 310; 30; 20 INJECTION, SOLUTION INTRAVENOUS at 20:16

## 2024-08-21 RX ADMIN — Medication: at 12:31

## 2024-08-21 RX ADMIN — SODIUM CHLORIDE 75 MG: 9 INJECTION, SOLUTION INTRAVENOUS at 07:45

## 2024-08-21 RX ADMIN — FENTANYL CITRATE 25 MCG: 50 INJECTION, SOLUTION INTRAMUSCULAR; INTRAVENOUS at 11:22

## 2024-08-21 RX ADMIN — LIDOCAINE HYDROCHLORIDE 100 MG: 20 INJECTION, SOLUTION EPIDURAL; INFILTRATION; INTRACAUDAL; PERINEURAL at 10:14

## 2024-08-21 RX ADMIN — GLUCAGON HYDROCHLORIDE 1 MG: KIT at 10:44

## 2024-08-21 RX ADMIN — ONDANSETRON 4 MG: 2 INJECTION INTRAMUSCULAR; INTRAVENOUS at 12:27

## 2024-08-21 RX ADMIN — SODIUM CHLORIDE, POTASSIUM CHLORIDE, SODIUM LACTATE AND CALCIUM CHLORIDE: 600; 310; 30; 20 INJECTION, SOLUTION INTRAVENOUS at 12:30

## 2024-08-21 RX ADMIN — SODIUM CHLORIDE, PRESERVATIVE FREE 40 MG: 5 INJECTION INTRAVENOUS at 14:24

## 2024-08-21 RX ADMIN — PROPOFOL 200 MG: 10 INJECTION, EMULSION INTRAVENOUS at 10:14

## 2024-08-21 RX ADMIN — FENTANYL CITRATE 25 MCG: 50 INJECTION, SOLUTION INTRAMUSCULAR; INTRAVENOUS at 10:56

## 2024-08-21 RX ADMIN — SODIUM CHLORIDE, POTASSIUM CHLORIDE, SODIUM LACTATE AND CALCIUM CHLORIDE: 600; 310; 30; 20 INJECTION, SOLUTION INTRAVENOUS at 07:43

## 2024-08-21 RX ADMIN — ACETAMINOPHEN 650 MG: 650 SOLUTION ORAL at 07:44

## 2024-08-21 RX ADMIN — ALBUMIN (HUMAN) 12.5 G: 12.5 INJECTION, SOLUTION INTRAVENOUS at 10:27

## 2024-08-21 RX ADMIN — ROCURONIUM BROMIDE 50 MG: 10 INJECTION, SOLUTION INTRAVENOUS at 10:14

## 2024-08-21 RX ADMIN — ONDANSETRON 4 MG: 2 INJECTION INTRAMUSCULAR; INTRAVENOUS at 19:44

## 2024-08-21 RX ADMIN — ONDANSETRON 4 MG: 2 INJECTION INTRAMUSCULAR; INTRAVENOUS at 11:28

## 2024-08-21 RX ADMIN — ENOXAPARIN SODIUM 30 MG: 100 INJECTION SUBCUTANEOUS at 07:44

## 2024-08-21 RX ADMIN — DEXAMETHASONE SODIUM PHOSPHATE 8 MG: 4 INJECTION, SOLUTION INTRAMUSCULAR; INTRAVENOUS at 10:17

## 2024-08-21 RX ADMIN — CEFAZOLIN 2000 MG: 2 INJECTION, POWDER, FOR SOLUTION INTRAMUSCULAR; INTRAVENOUS at 10:17

## 2024-08-21 RX ADMIN — PROCHLORPERAZINE EDISYLATE 5 MG: 5 INJECTION INTRAMUSCULAR; INTRAVENOUS at 12:40

## 2024-08-21 ASSESSMENT — PAIN SCALES - GENERAL
PAINLEVEL_OUTOF10: 8
PAINLEVEL_OUTOF10: 7
PAINLEVEL_OUTOF10: 7
PAINLEVEL_OUTOF10: 4
PAINLEVEL_OUTOF10: 6

## 2024-08-21 ASSESSMENT — PAIN DESCRIPTION - DESCRIPTORS
DESCRIPTORS: SORE
DESCRIPTORS: ACHING

## 2024-08-21 ASSESSMENT — PAIN DESCRIPTION - PAIN TYPE: TYPE: SURGICAL PAIN

## 2024-08-21 ASSESSMENT — ENCOUNTER SYMPTOMS: SHORTNESS OF BREATH: 0

## 2024-08-21 ASSESSMENT — PAIN - FUNCTIONAL ASSESSMENT
PAIN_FUNCTIONAL_ASSESSMENT: 0-10
PAIN_FUNCTIONAL_ASSESSMENT: ACTIVITIES ARE NOT PREVENTED

## 2024-08-21 ASSESSMENT — PAIN DESCRIPTION - LOCATION
LOCATION: ABDOMEN

## 2024-08-21 ASSESSMENT — PAIN DESCRIPTION - ORIENTATION
ORIENTATION: MID
ORIENTATION: MID;RIGHT;LEFT

## 2024-08-21 ASSESSMENT — LIFESTYLE VARIABLES: SMOKING_STATUS: 0

## 2024-08-21 NOTE — CARE COORDINATION
Discharge Planning Note:    Chart reviewed and it appears that patient has minimal needs for discharge at this time. Discussed with patient’s nurse and requested that case management be notified if discharge needs are identified.     - Current discharge plan is for the patient to return home.    Case management will continue to follow progress and update discharge plan as needed.      Risk of Readmission Score: 5%    ADELINA Faye RN    [facility]  Phone: [phone]

## 2024-08-21 NOTE — ANESTHESIA POSTPROCEDURE EVALUATION
Department of Anesthesiology  Postprocedure Note    Patient: Joanne Cm  MRN: [id number]  YOB: 1970  Date of evaluation: 8/21/2024    Procedure Summary       Date: 08/21/24 Room / Location: [address]    Anesthesia Start: 1008 Anesthesia Stop: 1157    Procedure: LAPAROSCOPIC SLEEVE GASTRECTOMY (Abdomen) Diagnosis:       Morbid obesity (HCC)      Severe obesity (BMI 35.0-39.9) with comorbidity (HCC)      (Morbid obesity (HCC) [E66.01])      (Severe obesity (BMI 35.0-39.9) with comorbidity (HCC) [E66.01])    Surgeons: Delroy Kelly MD Responsible Provider: Marisa Ward MD    Anesthesia Type: general ASA Status: 3            Anesthesia Type: No value filed.    Juan Jose Phase I: Juan Jose Score: 8    Juan Jose Phase II:      Anesthesia Post Evaluation    Patient location during evaluation: PACU  Patient participation: complete - patient participated  Level of consciousness: awake and alert  Airway patency: patent  Nausea & Vomiting: no nausea and no vomiting  Cardiovascular status: hemodynamically stable  Respiratory status: acceptable  Hydration status: stable  Multimodal analgesia pain management approach  Pain management: adequate    No notable events documented.

## 2024-08-21 NOTE — ANESTHESIA PRE PROCEDURE
Department of Anesthesiology  Preprocedure Note       Name:  Joanne Cm   Age:  54 y.o.  :  1970                                          MRN:  [id number]         Date:  2024      Surgeon: Surgeon(s):  Delroy Kelly MD    Procedure:     Medications prior to admission:   Prior to Admission medications    Medication Sig Start Date End Date Taking? Authorizing Provider   venlafaxine (EFFEXOR XR) 75 MG extended release capsule TAKE 1 CAPSULE BY MOUTH EVERY DAY 24  Yes Shiraz Randall DO   ferrous gluconate (FERGON) 324 (38 Fe) MG tablet TAKE 1 TABLET BY MOUTH 2 TIMES A DAY FOR ANEMIA 24  Yes Shiraz Randall DO       Current medications:    Current Facility-Administered Medications   Medication Dose Route Frequency Provider Last Rate Last Admin    acetaminophen (TYLENOL) 160 MG/5ML solution 650 mg  650 mg Oral Once Delroy Kelly MD        lactated ringers IV soln infusion   IntraVENous Continuous Delroy Kelly MD        sodium chloride flush 0.9 % injection 5-40 mL  5-40 mL IntraVENous 2 times per day Delroy Kelly MD        sodium chloride flush 0.9 % injection 5-40 mL  5-40 mL IntraVENous PRN Delroy Kelly MD        0.9 % sodium chloride infusion   IntraVENous PRN Delroy Kelly MD        scopolamine (TRANSDERM-SCOP) transdermal patch 1 patch  1 patch TransDERmal Once Dleroy Kelly MD        enoxaparin Sodium (LOVENOX) injection 30 mg  30 mg SubCUTAneous Once Delroy Kelly MD        ceFAZolin (ANCEF) 2,000 mg in sterile water 20 mL IV syringe  2,000 mg IntraVENous On Call to OR Delroy Kelly MD        fosaprepitant (EMEND) 75 mg in sodium chloride 0.9 % 100 mL IVPB  75 mg IntraVENous Once Delroy Kelly MD           Allergies:  No Known Allergies    Problem List:    Patient Active Problem List   Diagnosis Code    Depression F32.A    Moderate episode of recurrent major depressive disorder (HCC) F33.1    Severe obesity (BMI 35.0-39.9) with comorbidity (HCC) E66.01    Elevated

## 2024-08-21 NOTE — DISCHARGE INSTRUCTIONS
You may restart the medications listed in your discharge paperwork. There are some that you will hold for 2 weeks and they are listed later in this paragraph. Unless otherwise noted you will crush your medications for the first 2 weeks post op and mix them with clears for the first four days and then with your pureed food for the remainder of the 2 weeks. If you do not tolerate your medications crushed you may quarter or cut in half and take one piece at a time. I have written for famotidine (Pepcid) which you may take daily as needed for acid reflux/heartburn. You may take your venlafaxine (Effexor XR) whole as it cannot be opened. After two weeks, you may start taking all your pills whole. Please make sure when taking whole pills, you do not take them all at once. Take them one at a time and allow a minute or so between each one. You may restart your ferrous gluconate (Iron) in 2 weeks when you are able to take whole pills again.     Activity  You are encouraged to walk through the entire postoperative period as this will help with preventing clots, pneumonia and constipation. You are restricted from lifting anything greater than 10 lbs for the first 6 weeks after surgery. You may shower starting on postoperative day #2 (second day after surgery), but should not get into baths, pools and/or hot tubs until the provider releases you to do so.    Driving  You should not drive for at least the first week after your surgery and/or if you are still taking pain medication. Most patients generally drive to their 2-week postoperative appointment.    Constipation  Constipation can occur following surgery. This is due to anesthesia, decreased fluids and fiber in your diet, drinking protein shakes and taking pain medication. Make sure you are getting 48-64 ounces of fluids per day and walking. If you develop constipation you may use docusate sodium (Colace) or Dulcolax which are over-the-counter stool softeners and can be

## 2024-08-21 NOTE — OP NOTE
Operative Note      Patient: Joanne Cm  YOB: 1970  MRN: [id number]    Date of Procedure: 8/21/2024  [facility]   [facility]  [address] [place] [address], [address]    [facility] [address] .  Phone: [phone]  Fax: [phone]             Procedure Note    Indications: The patient was evaluated by our multidisciplinary team and was found to be a good candidate for weight loss surgery for future weight loss. Starting BMI 37.41 kg/m².    Risks and benefits explained and Joanne Cm wants to proceed.         Pre-operative Diagnosis:   Patient Active Problem List    Diagnosis Date Noted    Morbid obesity (HCC) 07/24/2024    H/O supraventricular tachycardia 06/21/2024    H/O cardiac radiofrequency ablation 04/23/2024    Elevated low density lipoprotein (LDL) cholesterol level 03/28/2024    Chronic GERD 03/28/2024    Severe obesity (BMI 35.0-39.9) with comorbidity (HCC) 10/16/2023    Depression 07/29/2020    Moderate episode of recurrent major depressive disorder (HCC) 07/29/2020         Post-operative Diagnosis:   Same      Procedure:    - Laparoscopic Sleeve Gastrectomy.      Surgeon: Delroy Kelly MD, FACS, [facility].    Anesthesia: General endotracheal anesthesia        Procedure Details   The patient was seen again in the Holding Room. The risks, benefits, complications, treatment options, and expected outcomes were discussed with the patient and/or family.  Both open and laparoscopic approach were explained in details. Benefits and risks explained. Informed consent obtained.   Risks including but not limited to;Infection, bleeding, gastric leak or obstruction, persistent nausea, vomiting, or reflux, injury to surrounding structures, risks of anesthesia, stricture, delayed gastric emptying, staple line leak, incisional hernia, malnutrition, vitamin deficiency, neurological complications, paralysis,  hair loss, and/ or Conversion to Open surgery may be necessary.  Failure to  attachments were taken down and continued to the gastro-esophageal junction.  A 34 Moldovan dilator was placed along the lesser curvature and into the pylorus.      A stapler was fired along the dilator to create an appropriate sized gastric sleeve pouch. Blue loads followed by white loads were used to create the sleeve.  The staple line looked very healthy and no bleeding from staple line.  The stomach was confirmed to be completely divided with uniform shape,  no twist in the sleeve and wide patent incisura.       A 2-0 vicryl suture was used to over-sew and imbricate the sleeve staple line.  The staple line was completely over-sewn.  The dilator was removed and an Edlich tube was advanced across the sleeve to ensure patency mainly at incisura, then retracted to just above the GE junction.  The stomach distal to the staple line was clamped and methylene blue saline was injected under pressure confirming No obstruction (flow noted to duodenum) and No leak.     The abdomen was carefully inspected and there was no bleeding or any other abnormality.  The stomach was brought out through the RUQ incision.    Hemostasis was confirmed. Snow applied along suture line and/or biopsy sites to ensure hemostasis.  The 15 and 12 port sites was closed using 0.0 Vicryl  suture at the level of the fascia and that was done under direct vision with the laparoscope to ensure proper closure and nothing entrapped.     Again inspected the surgical filed to make sure no active bleeding. Of note, I made sure that blood pressure is at base line multiple times during the case as well at end of case.     Local Anesthetic used at port sites. The trocar site skin wounds were closed using 4.0 Vicryl after copious Irrigation of the wounds.  Dermabond / or steri strips applied.      Instrument, sponge, and needle counts were correct at the conclusion of the case.       Findings:  As above.    Estimated Blood Loss:  Minimal           Drains: none

## 2024-08-21 NOTE — PROGRESS NOTES
Patient to PACU from OR. Very drowsy, oral airway on place. VSS. Abdomen soft, lap sites intact. Will monitor.

## 2024-08-21 NOTE — PROGRESS NOTES
Pt feeling nauseated after zofran, gave compazine 5mg IV per orders, dilaudid PCA started per orders. Will monitor

## 2024-08-21 NOTE — PROGRESS NOTES
Pt resting, vss, remains on 2L NC, using dilaudid PCA, nausea \" a little better\", abd lap sites x5 CD&I, phase 1 discharge criteria met

## 2024-08-21 NOTE — PROGRESS NOTES
Nursing care provided to prep patient for surgery.  Patient lost 14 lbs. on pre-op diet, informed surgeon.  Pre-op orders completed.  Bilateral foot pneumatic sleeves applied.  Teaching / education initiated regarding perioperative experience, post-op expectations and plan of care, and pain management during hospital stay.  Patient verbalized understanding. The care plan and education has been reviewed and mutually agreed upon with the patient.

## 2024-08-21 NOTE — PROGRESS NOTES
Patient s/p sleeve gastrectomy.  VSS. Abdomen soft, appropriately tender, incision stable with no erythema.  Patient ambulating in hallway, with binder in place, every 60-90 minutes.  SCD's in place while in bed.  I/O being monitored, patient remains NPO.  Patient instructed on proper use and frequency of incentive spirometer.  Pain controlled with PCA and use of ice..  Discussed plan of care with patient, to have UGI in the morning. The care plan and education has been reviewed and mutually agreed upon with the patient.

## 2024-08-21 NOTE — H&P
[facility]   [facility]  Delroy Kelly MD, FACS, [address], [address] [place] [id number] .  Phone: [phone]  Fax: [phone]         H&P Update      Patient's History and Physical from August 6, 2024 was reviewed.    Patient examined.    I have reviewed with the patient and/or family the risks, benefits, and alternatives to the procedure. Patient aware of risks and benefits and wishes to proceed.     There has been no change.      The patient was counseled at length about the risks of jayson Covid-19 during their perioperative period and any recovery window from their procedure.  The patient was made aware that jayson Covid-19  may worsen their prognosis for recovering from their procedure  and lend to a higher morbidity and/or mortality risk.  All material risks, benefits, and reasonable alternatives including postponing the procedure were discussed. The patient does wish to proceed with the procedure at this time.           Electronically signed by Merari Nguyen RN ,CBN on 8/21/2024 at 8:11 AM

## 2024-08-21 NOTE — PROGRESS NOTES
Met with patient in pre op bay. Discussed post op expectations and goals. Instructed pt on pain med and nausea meds. Instructed pt regarding post op expectation to ambulate once they are awake and within 2 hours of arriving to inpt unit.  Discussed plan for UGI tomorrow am and once results have been read, the bariatric team will see patient and start on fluids. Continued to stress the post op goals of ambulation, use of incentive spirometer, and hydration.  Patient verbalized understanding and agrees with plan of care.

## 2024-08-22 ENCOUNTER — APPOINTMENT (OUTPATIENT)
Dept: GENERAL RADIOLOGY | Age: 54
DRG: 403 | End: 2024-08-22
Attending: SURGERY
Payer: COMMERCIAL

## 2024-08-22 VITALS
OXYGEN SATURATION: 100 % | BODY MASS INDEX: 34.82 KG/M2 | RESPIRATION RATE: 17 BRPM | WEIGHT: 209 LBS | SYSTOLIC BLOOD PRESSURE: 133 MMHG | HEART RATE: 72 BPM | HEIGHT: 65 IN | TEMPERATURE: 98.2 F | DIASTOLIC BLOOD PRESSURE: 84 MMHG

## 2024-08-22 PROBLEM — E66.01 SEVERE OBESITY (BMI 35.0-39.9) WITH COMORBIDITY (HCC): Status: RESOLVED | Noted: 2023-10-16 | Resolved: 2024-08-22

## 2024-08-22 LAB
ANION GAP SERPL CALCULATED.3IONS-SCNC: 13 MMOL/L (ref 3–16)
BUN SERPL-MCNC: 8 MG/DL (ref 7–20)
CALCIUM SERPL-MCNC: 9.4 MG/DL (ref 8.3–10.6)
CHLORIDE SERPL-SCNC: 98 MMOL/L (ref 99–110)
CO2 SERPL-SCNC: 22 MMOL/L (ref 21–32)
CREAT SERPL-MCNC: <0.5 MG/DL (ref 0.6–1.1)
DEPRECATED RDW RBC AUTO: 13.1 % (ref 12.4–15.4)
GFR SERPLBLD CREATININE-BSD FMLA CKD-EPI: >90 ML/MIN/{1.73_M2}
GLUCOSE SERPL-MCNC: 107 MG/DL (ref 70–99)
HCT VFR BLD AUTO: 44 % (ref 36–48)
HGB BLD-MCNC: 15.1 G/DL (ref 12–16)
MCH RBC QN AUTO: 30.2 PG (ref 26–34)
MCHC RBC AUTO-ENTMCNC: 34.2 G/DL (ref 31–36)
MCV RBC AUTO: 88.1 FL (ref 80–100)
PLATELET # BLD AUTO: 219 K/UL (ref 135–450)
PMV BLD AUTO: 9.6 FL (ref 5–10.5)
POTASSIUM SERPL-SCNC: 5 MMOL/L (ref 3.5–5.1)
RBC # BLD AUTO: 5 M/UL (ref 4–5.2)
SODIUM SERPL-SCNC: 133 MMOL/L (ref 136–145)
WBC # BLD AUTO: 10 K/UL (ref 4–11)

## 2024-08-22 PROCEDURE — 80048 BASIC METABOLIC PNL TOTAL CA: CPT

## 2024-08-22 PROCEDURE — 96375 TX/PRO/DX INJ NEW DRUG ADDON: CPT

## 2024-08-22 PROCEDURE — 96372 THER/PROPH/DIAG INJ SC/IM: CPT

## 2024-08-22 PROCEDURE — APPSS180 APP SPLIT SHARED TIME > 60 MINUTES: Performed by: NURSE PRACTITIONER

## 2024-08-22 PROCEDURE — 6370000000 HC RX 637 (ALT 250 FOR IP): Performed by: SURGERY

## 2024-08-22 PROCEDURE — 2580000003 HC RX 258: Performed by: NURSE PRACTITIONER

## 2024-08-22 PROCEDURE — 2580000003 HC RX 258: Performed by: SURGERY

## 2024-08-22 PROCEDURE — 6360000002 HC RX W HCPCS: Performed by: SURGERY

## 2024-08-22 PROCEDURE — 96365 THER/PROPH/DIAG IV INF INIT: CPT

## 2024-08-22 PROCEDURE — 74240 X-RAY XM UPR GI TRC 1CNTRST: CPT

## 2024-08-22 PROCEDURE — 99024 POSTOP FOLLOW-UP VISIT: CPT | Performed by: NURSE PRACTITIONER

## 2024-08-22 PROCEDURE — 96366 THER/PROPH/DIAG IV INF ADDON: CPT

## 2024-08-22 PROCEDURE — G0378 HOSPITAL OBSERVATION PER HR: HCPCS

## 2024-08-22 PROCEDURE — 36415 COLL VENOUS BLD VENIPUNCTURE: CPT

## 2024-08-22 PROCEDURE — 6360000002 HC RX W HCPCS: Performed by: NURSE PRACTITIONER

## 2024-08-22 PROCEDURE — 85027 COMPLETE CBC AUTOMATED: CPT

## 2024-08-22 PROCEDURE — 6360000004 HC RX CONTRAST MEDICATION

## 2024-08-22 RX ORDER — FAMOTIDINE 20 MG/1
20 TABLET, FILM COATED ORAL DAILY PRN
Qty: 30 TABLET | Refills: 0 | Status: SHIPPED | OUTPATIENT
Start: 2024-08-22

## 2024-08-22 RX ORDER — PROMETHAZINE HYDROCHLORIDE 6.25 MG/5ML
12.5 SYRUP ORAL EVERY 6 HOURS PRN
Status: DISCONTINUED | OUTPATIENT
Start: 2024-08-22 | End: 2024-08-22 | Stop reason: HOSPADM

## 2024-08-22 RX ORDER — OXYCODONE AND ACETAMINOPHEN 5; 325 MG/1; MG/1
1 TABLET ORAL EVERY 6 HOURS PRN
Qty: 28 TABLET | Refills: 0 | Status: SHIPPED | OUTPATIENT
Start: 2024-08-22 | End: 2024-08-29

## 2024-08-22 RX ORDER — ONDANSETRON 8 MG/1
8 TABLET, ORALLY DISINTEGRATING ORAL EVERY 8 HOURS PRN
Qty: 30 TABLET | Refills: 0 | Status: SHIPPED | OUTPATIENT
Start: 2024-08-22

## 2024-08-22 RX ORDER — OXYCODONE AND ACETAMINOPHEN 5; 325 MG/1; MG/1
2 TABLET ORAL EVERY 4 HOURS PRN
Status: DISCONTINUED | OUTPATIENT
Start: 2024-08-22 | End: 2024-08-22 | Stop reason: HOSPADM

## 2024-08-22 RX ORDER — KETOROLAC TROMETHAMINE 30 MG/ML
15 INJECTION, SOLUTION INTRAMUSCULAR; INTRAVENOUS EVERY 6 HOURS
Status: COMPLETED | OUTPATIENT
Start: 2024-08-22 | End: 2024-08-22

## 2024-08-22 RX ORDER — VENLAFAXINE HYDROCHLORIDE 37.5 MG/1
75 CAPSULE, EXTENDED RELEASE ORAL
Status: DISCONTINUED | OUTPATIENT
Start: 2024-08-23 | End: 2024-08-22 | Stop reason: HOSPADM

## 2024-08-22 RX ORDER — ONDANSETRON 8 MG/1
8 TABLET, ORALLY DISINTEGRATING ORAL EVERY 8 HOURS PRN
Qty: 30 TABLET | Refills: 1 | Status: SHIPPED | OUTPATIENT
Start: 2024-08-22

## 2024-08-22 RX ORDER — OXYCODONE AND ACETAMINOPHEN 5; 325 MG/1; MG/1
1 TABLET ORAL EVERY 4 HOURS PRN
Status: DISCONTINUED | OUTPATIENT
Start: 2024-08-22 | End: 2024-08-22 | Stop reason: HOSPADM

## 2024-08-22 RX ORDER — SODIUM CHLORIDE 9 MG/ML
INJECTION, SOLUTION INTRAVENOUS CONTINUOUS
Status: DISCONTINUED | OUTPATIENT
Start: 2024-08-22 | End: 2024-08-22 | Stop reason: HOSPADM

## 2024-08-22 RX ADMIN — ONDANSETRON 4 MG: 2 INJECTION INTRAMUSCULAR; INTRAVENOUS at 05:54

## 2024-08-22 RX ADMIN — METOCLOPRAMIDE 10 MG: 5 INJECTION, SOLUTION INTRAMUSCULAR; INTRAVENOUS at 00:54

## 2024-08-22 RX ADMIN — SODIUM CHLORIDE: 9 INJECTION, SOLUTION INTRAVENOUS at 07:57

## 2024-08-22 RX ADMIN — SODIUM CHLORIDE, PRESERVATIVE FREE 10 ML: 5 INJECTION INTRAVENOUS at 07:59

## 2024-08-22 RX ADMIN — KETOROLAC TROMETHAMINE 15 MG: 30 INJECTION, SOLUTION INTRAMUSCULAR at 11:56

## 2024-08-22 RX ADMIN — SODIUM CHLORIDE, PRESERVATIVE FREE 40 MG: 5 INJECTION INTRAVENOUS at 07:58

## 2024-08-22 RX ADMIN — SODIUM CHLORIDE, POTASSIUM CHLORIDE, SODIUM LACTATE AND CALCIUM CHLORIDE: 600; 310; 30; 20 INJECTION, SOLUTION INTRAVENOUS at 02:59

## 2024-08-22 RX ADMIN — ENOXAPARIN SODIUM 30 MG: 100 INJECTION SUBCUTANEOUS at 07:57

## 2024-08-22 RX ADMIN — ENOXAPARIN SODIUM 30 MG: 100 INJECTION SUBCUTANEOUS at 00:54

## 2024-08-22 RX ADMIN — IOHEXOL 50 ML: 350 INJECTION, SOLUTION INTRAVENOUS at 08:48

## 2024-08-22 ASSESSMENT — PAIN DESCRIPTION - LOCATION
LOCATION: ABDOMEN

## 2024-08-22 ASSESSMENT — PAIN DESCRIPTION - PAIN TYPE: TYPE: SURGICAL PAIN

## 2024-08-22 ASSESSMENT — PAIN SCALES - WONG BAKER: WONGBAKER_NUMERICALRESPONSE: NO HURT

## 2024-08-22 ASSESSMENT — PAIN - FUNCTIONAL ASSESSMENT
PAIN_FUNCTIONAL_ASSESSMENT: ACTIVITIES ARE NOT PREVENTED
PAIN_FUNCTIONAL_ASSESSMENT: ACTIVITIES ARE NOT PREVENTED

## 2024-08-22 ASSESSMENT — PAIN DESCRIPTION - DESCRIPTORS
DESCRIPTORS: ACHING
DESCRIPTORS: ACHING

## 2024-08-22 ASSESSMENT — PAIN SCALES - GENERAL
PAINLEVEL_OUTOF10: 6
PAINLEVEL_OUTOF10: 0
PAINLEVEL_OUTOF10: 4
PAINLEVEL_OUTOF10: 3
PAINLEVEL_OUTOF10: 3
PAINLEVEL_OUTOF10: 0

## 2024-08-22 ASSESSMENT — PAIN DESCRIPTION - ORIENTATION
ORIENTATION: MID
ORIENTATION: MID

## 2024-08-22 NOTE — PROGRESS NOTES
0745: Shift assessment done, VSS, A/O. Neuro checks WNL.\Denies pain at this time. Meds given per MAR. Standard safety measures in place. The care plan and education has been reviewed and mutually agreed upon with the patient.    1009: saw Dr. Kelly NP, okay to discontinue PCA pump and start cups of water.       1200: PCA discontinued. Wasted with another RN.     1443: patient awaiting NP and is tolerating current diet.     1500: patient discharge order received, IV removed and discharge paperwork given. Awaiting ride.

## 2024-08-22 NOTE — PROGRESS NOTES
CLINICAL PHARMACY NOTE: MEDS TO BEDS    Total # of Prescriptions Filled: 3   The following medications were delivered to the patient:  Famotidine 20mg  Ondansetron ODT 8mg  Oxycodone/APAP 5/325mg    Additional Documentation:     NICOLETTE Fink approved medication delivery    Medications were delivered to patient's room and patient signed for    Yadira Mike CPhT

## 2024-08-22 NOTE — PLAN OF CARE
Problem: Discharge Planning  Goal: Discharge to home or other facility with appropriate resources  Outcome: Progressing     Problem: Pain  Goal: Verbalizes/displays adequate comfort level or baseline comfort level  8/22/2024 0853 by Jacquie Hopkins RN  Outcome: Progressing

## 2024-08-22 NOTE — PLAN OF CARE
Problem: Discharge Planning  Goal: Discharge to home or other facility with appropriate resources  8/21/2024 1748 by Reyna Sarabia, RN  Outcome: Progressing     Problem: Pain  Goal: Verbalizes/displays adequate comfort level or baseline comfort level  8/22/2024 0005 by Yadira Medina, RN  Outcome: Progressing  8/21/2024 1748 by Reyna Sarabia, RN  Outcome: Progressing

## 2024-08-22 NOTE — DISCHARGE SUMMARY
The patient was admitted on day of surgery and underwent a laparoscopic sleeve gastrectomy. Surgery went well and the patient went to our post-op bariatric floor. Overnight, the patient had stable vitals signs, good urine output and ambulated in the halls. On POD #1 the patient underwent an UGI which revealed no leak or obstruction. Phase I diet was started and the patient tolerated well. The patient has no N/V, tolerating diet, ambulating and pain controlled on oral medication.    The patient was discharged home today in stable condition. The patient will f/u in 2 weeks and was given dietary and ambulation instruction. The patient was instructed to call if there are any questions or concerns.     Patient Active Problem List    Diagnosis Date Noted    Obesity (BMI 30-39.9) 08/21/2024    S/P laparoscopic sleeve gastrectomy 08/21/2024    Morbid obesity (HCC) 07/24/2024    H/O supraventricular tachycardia 06/21/2024    H/O cardiac radiofrequency ablation 04/23/2024    Elevated low density lipoprotein (LDL) cholesterol level 03/28/2024    Chronic GERD 03/28/2024    Severe obesity (BMI 35.0-39.9) with comorbidity (HCC) 10/16/2023    Depression 07/29/2020    Moderate episode of recurrent major depressive disorder (HCC) 07/29/2020

## 2024-08-22 NOTE — PROGRESS NOTES
Patient s/p sleeve gastrectomy.  VSS. Abdomen soft, appropriately tender, incision stable with no erythema.  Patient ambulating in hallway, with binder in place, every 60-90 minutes.  SCD's in place while in bed.  I/O being monitored, patient remains NPO.  Patient instructed on proper use and frequency of incentive spirometer.  Pain controlled with PCA and use of ice.  Will continue to monitor pain control.  Discussed plan of care with patient, to have UGI in the morning and if normal will be able to start Phase 1 clear liquid diet.  The care plan and education has been reviewed and mutually agreed upon with the patient.

## 2024-08-22 NOTE — PROGRESS NOTES
[facility]   General & Laparoscopic Surgery  [employer]       Pt seen and examined    S/p laparoscopic sleeve gastrectomy     The patient is ambulating in [place]. Pain is well controlled. There is some nausea, but no vomiting. There are no other complaints or questions.     Vitals:    08/22/24 0058 08/22/24 0300 08/22/24 0544 08/22/24 0745   BP: (!) 145/86 122/73 120/70 126/78   Pulse: 82 79 81 70   Resp: 16 16 16 16   Temp:   98.2 °F (36.8 °C) 98.6 °F (37 °C)   TempSrc:   Oral Oral   SpO2: 97% 97% 98% 96%   Weight:       Height:         Abdomen is soft, appropriately tender, incisions stable with no erythema.   Breath sounds are clear bilaterally.  Bowel sounds are hypoactive in all quadrants.     Data  CBC:   Lab Results   Component Value Date/Time    WBC 10.0 08/22/2024 05:01 AM    RBC 5.00 08/22/2024 05:01 AM    HGB 15.1 08/22/2024 05:01 AM    HCT 44.0 08/22/2024 05:01 AM    MCV 88.1 08/22/2024 05:01 AM    MCH 30.2 08/22/2024 05:01 AM    MCHC 34.2 08/22/2024 05:01 AM    RDW 13.1 08/22/2024 05:01 AM     08/22/2024 05:01 AM    MPV 9.6 08/22/2024 05:01 AM     BMP:    Lab Results   Component Value Date/Time     08/22/2024 05:01 AM    K 5.0 08/22/2024 05:01 AM    K 4.6 08/19/2023 06:55 PM    CL 98 08/22/2024 05:01 AM    CO2 22 08/22/2024 05:01 AM    BUN 8 08/22/2024 05:01 AM    CREATININE <0.5 08/22/2024 05:01 AM    CALCIUM 9.4 08/22/2024 05:01 AM    GFRAA >60 05/21/2014 09:00 PM    LABGLOM >90 08/22/2024 05:01 AM    LABGLOM >90 04/08/2024 10:43 AM    GLUCOSE 107 08/22/2024 05:01 AM     Current Inpatient Medications    Current Facility-Administered Medications: pantoprazole (PROTONIX) 40 mg in sodium chloride (PF) 0.9 % 10 mL injection, 40 mg, IntraVENous, Daily  diphenhydrAMINE (BENADRYL) injection 12.5 mg, 12.5 mg, IntraVENous, Q6H PRN  hydrALAZINE (APRESOLINE) injection 10 mg, 10 mg, IntraVENous, Q2H PRN  hyoscyamine (LEVSIN) 500 MCG/ML injection 250 mcg, 250 mcg,

## 2024-08-23 ENCOUNTER — TELEPHONE (OUTPATIENT)
Dept: PRIMARY CARE CLINIC | Age: 54
End: 2024-08-23

## 2024-08-23 NOTE — TELEPHONE ENCOUNTER
Care Transitions Initial Follow Up Call    Outreach made within 2 business days of discharge: Yes    Patient: Joanne Cm Patient : 1970   MRN: [id number]  Reason for Admission: Morbid Obesity  Discharge Date: 24       Spoke with: Patient    Discharge department/facility: [facility] Interactive Patient Contact:  Was patient able to fill all prescriptions: No: Will  today  Was patient instructed to bring all medications to the follow-up visit: Yes  Is patient taking all medications as directed in the discharge summary? Yes  Does patient understand their discharge instructions: Yes  Does patient have questions or concerns that need addressed prior to 7-14 day follow up office visit: no    Additional needs identified to be addressed with provider  No needs identified             Scheduled appointment with PCP within 7-14 days    Follow Up  Future Appointments   Date Time Provider Department Center   2024 10:15 AM Shiraz Randall DO [facility]   9/3/2024 10:30 AM Delroy Kelly MD [facility]   10/16/2024  1:15 PM Shiraz Randall DO [facility]       HO KLEIN LPN

## 2024-08-25 ENCOUNTER — PATIENT MESSAGE (OUTPATIENT)
Dept: BARIATRICS/WEIGHT MGMT | Age: 54
End: 2024-08-25

## 2024-08-26 ENCOUNTER — TELEPHONE (OUTPATIENT)
Dept: BARIATRICS/WEIGHT MGMT | Age: 54
End: 2024-08-26

## 2024-08-26 NOTE — TELEPHONE ENCOUNTER
Surgery Type: Laparoscopic Sleeve Gastrectomy     Surgery Date: 08/21/2024    Surgeon: Dr Kelly    The patient was contacted to follow up on their recent bariatric surgery. The following topics were reviewed:    [x] Hydration is Adequate 54 oz water, propel, gatorade zero- pt was counting protein shakes as fluids so her fluid intake may be closer to 38 oz/day          [x] Patient is getting at least 48-64 oz of fluids a day, not including protein shakes.    [x]Consuming Adequate Protein         [x] Consuming 2 protein shakes a day         [x] Consuming equal to 60-80 grams of protein a day    Mixing protein powder with 8 ounces of  water, 1% milk .    [x] Food intake is  appropriate   [x] Adequately pureeing foods, so that there are no chunks left.  [x] Taking in 1-2 oz at a time  [x] Pt is eating 1 times per day  [x] Following the 30-30-30 rule.   - Patient is eating pureed food over 30 minute timeframe.    - Patient is  fluids out from food by 30 minutes.   [x] Multivitamin capsule started  [x] Reminded patient to keep food diary to bring to their 2 week follow up appointment.     [x] Pain relief techniques utilized  [] Taking pain medication as prescribed  [] Utilizing Lidoderm patches (if prescribed)  [] Taking Tylenol instead of prescription pain medication  [x] Wearing abdominal binder  [x] Using ice for incisional pain    [x] Activity is appropriate  [x] Walking 10 minutes out of every hour  [x] Avoiding heavy lifting (>10lbs)  [x] Utilizing their incentive spirometer   [x]  is using 10 times per hour while awake    [] Issues with Nausea/Vomiting/Reflux  [] Using Zofran PRN for nausea/vomiting   [x] Taking Pepcid for reflux, has taken as needed pre instructions    [x] Issues with Constipation- pt states she did feel slightly constipated, took stool softener and did have a BM yesterday.         [x] Pt has had a BM since surgery              [x] Pt does feel constipated  [] Tried Colace  [] Tried  Miralax    All questions and concerns addressed including pt counting protein shakes as fluid intake. Instructed pt to not count protein shakes as her fluids as this needs to be separate.  Fluid intake may be closer to 38 oz a day (54 oz - 16 oz of protein shakes = approx 38 oz fluid).  Pt feels confident she will be able to reach fluid goal. Advised to hold protein shakes and any pureed foods until she is sure she can get 48-64 oz fluids/day. Pt asking if she can start back on Effexor XR 75 mg, reviewed discharge meds with patient and did instruct her she should resume her Effexor per orders.  Pt verbalized understanding of all instructions.     Patient was asked to please fill out hospital survey if she receives one in the mail.

## 2024-08-26 NOTE — TELEPHONE ENCOUNTER
Looks like patient will be able to meet goals. Agree with recommendations on progression of diet.  Thank you,  ROYA ZavalaC

## 2024-08-27 NOTE — TELEPHONE ENCOUNTER
I already sent her a response yesterday to this question and Annmarie reviewed it with her on her one week follow up call. Looks like she sent two messages.  Thank you,  ROYA ZavalaC

## 2024-08-28 ENCOUNTER — OFFICE VISIT (OUTPATIENT)
Dept: PRIMARY CARE CLINIC | Age: 54
End: 2024-08-28
Payer: COMMERCIAL

## 2024-08-28 VITALS
DIASTOLIC BLOOD PRESSURE: 71 MMHG | SYSTOLIC BLOOD PRESSURE: 110 MMHG | WEIGHT: 206 LBS | BODY MASS INDEX: 32.33 KG/M2 | HEART RATE: 101 BPM | HEIGHT: 67 IN | TEMPERATURE: 98.7 F

## 2024-08-28 DIAGNOSIS — Z09 HOSPITAL DISCHARGE FOLLOW-UP: Primary | ICD-10-CM

## 2024-08-28 DIAGNOSIS — Z98.84 S/P LAPAROSCOPIC SLEEVE GASTRECTOMY: ICD-10-CM

## 2024-08-28 DIAGNOSIS — E66.01 MORBID OBESITY (HCC): ICD-10-CM

## 2024-08-28 PROBLEM — Z98.890 H/O CARDIAC RADIOFREQUENCY ABLATION: Status: RESOLVED | Noted: 2024-04-23 | Resolved: 2024-08-28

## 2024-08-28 PROCEDURE — 99214 OFFICE O/P EST MOD 30 MIN: CPT | Performed by: STUDENT IN AN ORGANIZED HEALTH CARE EDUCATION/TRAINING PROGRAM

## 2024-08-28 PROCEDURE — 1111F DSCHRG MED/CURRENT MED MERGE: CPT | Performed by: STUDENT IN AN ORGANIZED HEALTH CARE EDUCATION/TRAINING PROGRAM

## 2024-08-28 SDOH — ECONOMIC STABILITY: FOOD INSECURITY: WITHIN THE PAST 12 MONTHS, THE FOOD YOU BOUGHT JUST DIDN'T LAST AND YOU DIDN'T HAVE MONEY TO GET MORE.: NEVER TRUE

## 2024-08-28 SDOH — ECONOMIC STABILITY: FOOD INSECURITY: WITHIN THE PAST 12 MONTHS, YOU WORRIED THAT YOUR FOOD WOULD RUN OUT BEFORE YOU GOT MONEY TO BUY MORE.: NEVER TRUE

## 2024-08-28 SDOH — ECONOMIC STABILITY: INCOME INSECURITY: HOW HARD IS IT FOR YOU TO PAY FOR THE VERY BASICS LIKE FOOD, HOUSING, MEDICAL CARE, AND HEATING?: NOT HARD AT ALL

## 2024-08-28 ASSESSMENT — ENCOUNTER SYMPTOMS
DIARRHEA: 0
SHORTNESS OF BREATH: 0
ABDOMINAL DISTENTION: 0
NAUSEA: 0
CHEST TIGHTNESS: 0
ABDOMINAL PAIN: 0

## 2024-08-28 NOTE — PROGRESS NOTES
Post-Discharge Transitional Care  Follow Up      Joanne Cm   YOB: 1970    Date of Office Visit:  8/28/2024  Date of Hospital Admission: 8/21/24  Date of Hospital Discharge: 8/22/24  Risk of hospital readmission (high >=14%. Medium >=10%) :Readmission Risk Score: 4.7      Care management risk score Rising risk (score 2-5) and Complex Care (Scores >=6): No Risk Score On File     Non face to face  following discharge, date last encounter closed (first attempt may have been earlier): 08/23/2024    Call initiated 2 business days of discharge: Yes    ASSESSMENT/PLAN:   Hospital discharge follow-up  -     SD DISCHARGE MEDS RECONCILED W/ CURRENT OUTPATIENT MED LIST  S/P laparoscopic sleeve gastrectomy: Pain is well-managed.  Bowel movements returning to normal; no constipation.  Incisions healing well without indication of infection.  Has follow-up with surgeon September 3.    Morbid obesity (HCC): Following diet outlined by bariatric center.  Down 16 pounds.    Medical Decision Making: moderate complexity  Return for Annual Wellness.    On this date 8/28/2024 I have spent 35 minutes reviewing previous notes, test results and face to face with the patient discussing the diagnosis and importance of compliance with the treatment plan as well as documenting on the day of the visit.       Subjective:   HPI:  Follow up of Hospital problems/diagnosis(es): Morbid obesity, s/p sleeve gastrectomy    Inpatient course: Discharge summary reviewed- see chart.    Interval history/Current status: Joanne presents today for evaluation after hospital admission for sleeve gastrectomy.  She tolerated the procedure well without complications.  She is currently on a puréed diet.  Pain is minimal.  Her bowel movements are soft, but returning to normal.  She has not had any fevers, chills, nausea, vomiting, diarrhea, redness or drainage from the incisions.    Patient Active Problem List   Diagnosis    Depression    Moderate    Cardiovascular:      Rate and Rhythm: Normal rate and regular rhythm.      Pulses: Normal pulses.      Heart sounds: Normal heart sounds.   Pulmonary:      Effort: Pulmonary effort is normal.      Breath sounds: Normal breath sounds.   Musculoskeletal:         General: No deformity.   Skin:     General: Skin is warm and dry.      Capillary Refill: Capillary refill takes less than 2 seconds.      Findings: Lesion (see image) present.   Neurological:      General: No focal deficit present.      Mental Status: She is alert. Mental status is at baseline.   Psychiatric:         Mood and Affect: Mood normal.         Behavior: Behavior normal.         Thought Content: Thought content normal.         Judgment: Judgment normal.             An electronic signature was used to authenticate this note.  --Shiraz Randall, DO

## 2024-09-03 ENCOUNTER — OFFICE VISIT (OUTPATIENT)
Dept: BARIATRICS/WEIGHT MGMT | Age: 54
End: 2024-09-03

## 2024-09-03 VITALS
BODY MASS INDEX: 33.99 KG/M2 | OXYGEN SATURATION: 98 % | RESPIRATION RATE: 18 BRPM | HEART RATE: 87 BPM | HEIGHT: 65 IN | SYSTOLIC BLOOD PRESSURE: 116 MMHG | WEIGHT: 204 LBS | DIASTOLIC BLOOD PRESSURE: 70 MMHG

## 2024-09-03 DIAGNOSIS — Z98.84 S/P LAPAROSCOPIC SLEEVE GASTRECTOMY: Primary | ICD-10-CM

## 2024-09-03 PROCEDURE — 99024 POSTOP FOLLOW-UP VISIT: CPT | Performed by: SURGERY

## 2024-09-03 RX ORDER — CELECOXIB 200 MG/1
200 CAPSULE ORAL DAILY
Qty: 7 CAPSULE | Refills: 0 | Status: SHIPPED | OUTPATIENT
Start: 2024-09-03 | End: 2024-09-10

## 2024-09-03 NOTE — PROGRESS NOTES
[facility]   [facility]  Delroy Kelly MD, FACS, [facility]  [address], [address] [place] [address] .  Phone: [phone]  Fax: [phone]       The patient is a 54 y.o. female who returns today for follow up.   Joanne Cm is s/p     Laparoscopic sleeve gastrectomy    We discussed how her weight affects her overall health including:  Patient Active Problem List   Diagnosis    Depression    Moderate episode of recurrent major depressive disorder (HCC)    Elevated low density lipoprotein (LDL) cholesterol level    Chronic GERD    H/O supraventricular tachycardia    Morbid obesity (HCC)    Obesity (BMI 30-39.9)    S/P laparoscopic sleeve gastrectomy        Vitals:    09/03/24 0952   BP: 116/70   Pulse: 87   Resp: 18   SpO2: 98%   Weight: 92.5 kg (204 lb)   Height: 1.651 m (5' 5\")       Lab Results   Component Value Date/Time    WBC 10.0 08/22/2024 05:01 AM    RBC 5.00 08/22/2024 05:01 AM    HGB 15.1 08/22/2024 05:01 AM    HCT 44.0 08/22/2024 05:01 AM    MCV 88.1 08/22/2024 05:01 AM    MCH 30.2 08/22/2024 05:01 AM    MCHC 34.2 08/22/2024 05:01 AM    MPV 9.6 08/22/2024 05:01 AM    NEUTOPHILPCT 65.7 08/06/2024 08:38 AM    LYMPHOPCT 24.9 08/06/2024 08:38 AM    MONOPCT 6.8 08/06/2024 08:38 AM    BASOPCT 0.3 08/06/2024 08:38 AM    NEUTROABS 4.5 08/06/2024 08:38 AM    LYMPHSABS 1.7 08/06/2024 08:38 AM    MONOSABS 0.5 08/06/2024 08:38 AM    EOSABS 0.2 08/06/2024 08:38 AM     Lab Results   Component Value Date/Time     08/22/2024 05:01 AM    K 5.0 08/22/2024 05:01 AM    K 4.6 08/19/2023 06:55 PM    CL 98 08/22/2024 05:01 AM    CO2 22 08/22/2024 05:01 AM    ANIONGAP 13 08/22/2024 05:01 AM    GLUCOSE 107 08/22/2024 05:01 AM    BUN 8 08/22/2024 05:01 AM    CREATININE <0.5 08/22/2024 05:01 AM    LABGLOM >90 08/22/2024 05:01 AM    LABGLOM >90 04/08/2024 10:43 AM    GFRAA >60 05/21/2014 09:00 PM    CALCIUM 9.4 08/22/2024 05:01 AM    AGRATIO 1.4 08/06/2024 08:38 AM    BILITOT 0.7

## 2024-09-03 NOTE — PROGRESS NOTES
Dietary Assessment Note      Vitals:   Vitals:    24 0952   BP: 116/70   Pulse: 87   Resp: 18   SpO2: 98%   Weight: 92.5 kg (204 lb)   Height: 1.651 m (5' 5\")    Patient lost 19 lbs since preop.    Total Weight Loss: 20.8 lbs    Labs reviewed: no lab studies available for review at time of visit    Protein intake: 60-80 grams/day     Fluid intake: 48-64 oz/day CL, Propel zero, water     Multivitamin/mineral intake: yes MVI fusion with Fe     Calcium intake:  none     Other: none - asking about     Exercise: yes walking     Nutrition Assessment: 2 post-op visit.  Does 2 shakes/day + all pureed foods   B: oatmeal OR cream of wheat OR grits + peaches canned   S: none OR yogurt OR pudding  L: soup OR sf jello OR protein shake  D: pureed tuna   S: sf jello OR shake    Amount able to eat per sittin-oz - 1/4 cup    Following  rule: yes, follows, chewing thoroughly     Food Intolerances/issues: none    Client Concerns: none    Goals:   Start Phase 3 diet at 3 weeks postop (24)    Plan: f/u in 1 mos or per provider    PEPE PATEL, MS, RD, LD

## 2024-09-26 DIAGNOSIS — M54.50 CHRONIC BILATERAL LOW BACK PAIN WITHOUT SCIATICA: ICD-10-CM

## 2024-09-26 DIAGNOSIS — G89.29 CHRONIC BILATERAL LOW BACK PAIN WITHOUT SCIATICA: ICD-10-CM

## 2024-09-27 RX ORDER — CYCLOBENZAPRINE HCL 10 MG
10 TABLET ORAL NIGHTLY PRN
Qty: 30 TABLET | Refills: 0 | Status: SHIPPED | OUTPATIENT
Start: 2024-09-27 | End: 2024-10-27

## 2024-10-01 ENCOUNTER — OFFICE VISIT (OUTPATIENT)
Dept: BARIATRICS/WEIGHT MGMT | Age: 54
End: 2024-10-01

## 2024-10-01 VITALS
SYSTOLIC BLOOD PRESSURE: 106 MMHG | HEIGHT: 65 IN | BODY MASS INDEX: 32.65 KG/M2 | DIASTOLIC BLOOD PRESSURE: 73 MMHG | HEART RATE: 88 BPM | WEIGHT: 196 LBS

## 2024-10-01 DIAGNOSIS — E78.00 ELEVATED LOW DENSITY LIPOPROTEIN (LDL) CHOLESTEROL LEVEL: ICD-10-CM

## 2024-10-01 DIAGNOSIS — E66.9 OBESITY (BMI 30-39.9): ICD-10-CM

## 2024-10-01 DIAGNOSIS — Z98.84 S/P LAPAROSCOPIC SLEEVE GASTRECTOMY: Primary | ICD-10-CM

## 2024-10-01 PROBLEM — K21.9 CHRONIC GERD: Status: RESOLVED | Noted: 2024-03-28 | Resolved: 2024-10-01

## 2024-10-01 PROCEDURE — 99024 POSTOP FOLLOW-UP VISIT: CPT | Performed by: SURGERY

## 2024-10-01 NOTE — PROGRESS NOTES
[facility]   [facility]  Delroy Kelly MD, FACS, [address], [address] [place] [id number] .  Phone: [phone]  Fax: [phone]       The patient is a 54 y.o. female who returns today for follow up.   Joanne Cm is s/p     Laparoscopic sleeve gastrectomy    We discussed how her weight affects her overall health including:  Patient Active Problem List   Diagnosis    Depression    Moderate episode of recurrent major depressive disorder (HCC)    Elevated low density lipoprotein (LDL) cholesterol level    H/O supraventricular tachycardia    Morbid obesity    Obesity (BMI 30-39.9)    S/P laparoscopic sleeve gastrectomy        Vitals:    10/01/24 0952   BP: 106/73   Pulse: 88   Weight: 88.9 kg (196 lb)   Height: 1.651 m (5' 5\")       Lab Results   Component Value Date/Time    WBC 10.0 08/22/2024 05:01 AM    RBC 5.00 08/22/2024 05:01 AM    HGB 15.1 08/22/2024 05:01 AM    HCT 44.0 08/22/2024 05:01 AM    MCV 88.1 08/22/2024 05:01 AM    MCH 30.2 08/22/2024 05:01 AM    MCHC 34.2 08/22/2024 05:01 AM    MPV 9.6 08/22/2024 05:01 AM    NEUTOPHILPCT 65.7 08/06/2024 08:38 AM    LYMPHOPCT 24.9 08/06/2024 08:38 AM    MONOPCT 6.8 08/06/2024 08:38 AM    BASOPCT 0.3 08/06/2024 08:38 AM    NEUTROABS 4.5 08/06/2024 08:38 AM    LYMPHSABS 1.7 08/06/2024 08:38 AM    MONOSABS 0.5 08/06/2024 08:38 AM    EOSABS 0.2 08/06/2024 08:38 AM     Lab Results   Component Value Date/Time     08/22/2024 05:01 AM    K 5.0 08/22/2024 05:01 AM    K 4.6 08/19/2023 06:55 PM    CL 98 08/22/2024 05:01 AM    CO2 22 08/22/2024 05:01 AM    ANIONGAP 13 08/22/2024 05:01 AM    GLUCOSE 107 08/22/2024 05:01 AM    BUN 8 08/22/2024 05:01 AM    CREATININE <0.5 08/22/2024 05:01 AM    LABGLOM >90 08/22/2024 05:01 AM    LABGLOM >90 04/08/2024 10:43 AM    GFRAA >60 05/21/2014 09:00 PM    CALCIUM 9.4 08/22/2024 05:01 AM    AGRATIO 1.4 08/06/2024 08:38 AM    BILITOT 0.7 08/06/2024 08:38 AM    ALKPHOS 127

## 2024-10-01 NOTE — PROGRESS NOTES
Dietary Assessment Note      Vitals:   Vitals:    10/01/24 0952   BP: 106/73   Pulse: 88   Weight: 88.9 kg (196 lb)   Height: 1.651 m (5' 5\")    Patient lost 8.0 lbs over 1 month.    Total Weight Loss: 29 lbs    Labs reviewed: no lab studies available for review at time of visit    Protein intake: <60 grams/day     Fluid intake: 48-64 oz/day - 4-5 bottles of water/propel/gatorade zero    Multivitamin/mineral intake: yes fusion capsule with iron    Calcium intake: no    Other: none    Exercise: yes walking + sit ups (discussed to limit these)    Nutrition Assessment: 6 weeks post-op visit.     Breakfast: breakfast bar (apple cinnamon) with 1/2 cup 1% milk (was dipping in milk to make it softer)  Snack: none  Lunch: bowl of soup (low fat, cream of celery) OR fruit   Snack: none  Dinner: mashed potatoes and peaches   Snack: none    Amount able to eat per sitting: up to 1/2 cup     Following 30/30/30 rule: Eating over 30 minutes. Waiting 30 minutes before and after meals.    Food Intolerances/issues: none    Client Concerns: discussed food items with pt that she should not have been eating on phase 3 foods.     Goals:   - start 2 ca soft chews  - start phase 4 foods  - aim for 60g protein minimum per day (track for next week to ensure hitting goal)    Plan: Follow up at 4 months post op and as needed    Aretha Braxton, RD, LD

## 2024-10-01 NOTE — PATIENT INSTRUCTIONS
Patient received dietary handouts and education.     Goals:   - start 2 ca soft chews  - start phase 4 foods  - aim for 60g protein minimum per day (track for next week to ensure hitting goal)

## 2024-10-22 ENCOUNTER — TELEPHONE (OUTPATIENT)
Dept: PRIMARY CARE CLINIC | Age: 54
End: 2024-10-22

## 2024-10-28 ENCOUNTER — NURSE ONLY (OUTPATIENT)
Dept: PRIMARY CARE CLINIC | Age: 54
End: 2024-10-28

## 2024-10-28 DIAGNOSIS — Z11.1 TUBERCULOSIS SCREENING: Primary | ICD-10-CM

## 2024-10-30 ENCOUNTER — NURSE ONLY (OUTPATIENT)
Dept: PRIMARY CARE CLINIC | Age: 54
End: 2024-10-30

## 2024-10-30 DIAGNOSIS — Z11.1 TUBERCULOSIS SCREENING: Primary | ICD-10-CM

## 2024-10-30 LAB
INDURATION: 0
TB SKIN TEST: NEGATIVE

## 2024-11-13 ENCOUNTER — PATIENT MESSAGE (OUTPATIENT)
Dept: PRIMARY CARE CLINIC | Age: 54
End: 2024-11-13

## 2024-11-13 RX ORDER — M-VIT,TX,IRON,MINS/CALC/FOLIC 27MG-0.4MG
1 TABLET ORAL DAILY
Qty: 30 TABLET | Refills: 11 | Status: SHIPPED | OUTPATIENT
Start: 2024-11-13 | End: 2025-11-13

## 2024-11-13 RX ORDER — M-VIT,TX,IRON,MINS/CALC/FOLIC 27MG-0.4MG
1 TABLET ORAL DAILY
Qty: 30 TABLET | Refills: 11 | Status: SHIPPED | OUTPATIENT
Start: 2024-11-13 | End: 2024-11-13 | Stop reason: SDUPTHER

## 2024-11-27 ENCOUNTER — CLINICAL DOCUMENTATION (OUTPATIENT)
Dept: BARIATRICS/WEIGHT MGMT | Age: 54
End: 2024-11-27

## 2024-12-03 ENCOUNTER — OFFICE VISIT (OUTPATIENT)
Dept: BARIATRICS/WEIGHT MGMT | Age: 54
End: 2024-12-03
Payer: COMMERCIAL

## 2024-12-03 VITALS
RESPIRATION RATE: 18 BRPM | SYSTOLIC BLOOD PRESSURE: 112 MMHG | OXYGEN SATURATION: 99 % | DIASTOLIC BLOOD PRESSURE: 74 MMHG | HEART RATE: 81 BPM | WEIGHT: 179 LBS | BODY MASS INDEX: 29.82 KG/M2 | HEIGHT: 65 IN

## 2024-12-03 DIAGNOSIS — Z98.84 S/P LAPAROSCOPIC SLEEVE GASTRECTOMY: Primary | ICD-10-CM

## 2024-12-03 DIAGNOSIS — E78.00 ELEVATED LOW DENSITY LIPOPROTEIN (LDL) CHOLESTEROL LEVEL: ICD-10-CM

## 2024-12-03 DIAGNOSIS — D50.0 IRON DEFICIENCY ANEMIA DUE TO CHRONIC BLOOD LOSS: ICD-10-CM

## 2024-12-03 DIAGNOSIS — F33.1 MODERATE EPISODE OF RECURRENT MAJOR DEPRESSIVE DISORDER (HCC): ICD-10-CM

## 2024-12-03 PROCEDURE — 1036F TOBACCO NON-USER: CPT | Performed by: SURGERY

## 2024-12-03 PROCEDURE — 99214 OFFICE O/P EST MOD 30 MIN: CPT | Performed by: SURGERY

## 2024-12-03 PROCEDURE — G8427 DOCREV CUR MEDS BY ELIG CLIN: HCPCS | Performed by: SURGERY

## 2024-12-03 PROCEDURE — G8417 CALC BMI ABV UP PARAM F/U: HCPCS | Performed by: SURGERY

## 2024-12-03 PROCEDURE — G2211 COMPLEX E/M VISIT ADD ON: HCPCS | Performed by: SURGERY

## 2024-12-03 PROCEDURE — 3017F COLORECTAL CA SCREEN DOC REV: CPT | Performed by: SURGERY

## 2024-12-03 PROCEDURE — G8484 FLU IMMUNIZE NO ADMIN: HCPCS | Performed by: SURGERY

## 2024-12-03 RX ORDER — M-VIT,TX,IRON,MINS/CALC/FOLIC 27MG-0.4MG
1 TABLET ORAL DAILY
Qty: 30 TABLET | Refills: 11 | Status: SHIPPED | OUTPATIENT
Start: 2024-12-03 | End: 2024-12-04 | Stop reason: SDUPTHER

## 2024-12-03 RX ORDER — FERROUS GLUCONATE 324(38)MG
TABLET ORAL
Qty: 180 TABLET | Refills: 0 | Status: SHIPPED | OUTPATIENT
Start: 2024-12-03

## 2024-12-03 RX ORDER — VENLAFAXINE HYDROCHLORIDE 75 MG/1
CAPSULE, EXTENDED RELEASE ORAL
Qty: 90 CAPSULE | Refills: 3 | Status: SHIPPED | OUTPATIENT
Start: 2024-12-03

## 2024-12-03 NOTE — PROGRESS NOTES
This is a complex visit given the patient is presenting with multiple chronic comorbid conditions.     Advised the patient that not getting there weight under control, which hopefully would help with getting some of the comorbidities under control. That could increase risk of complications/worsening of those conditions on the long-term.  During Covid-19 pandemic, CDC and health authorities does classify obese patients as vulnerable and high risk as well.  Which makes weight loss a priority for improvement of their wellbeing and overall health.     We discussed how her excess weight affects her overall health and importance of weight loss, healthy diet and active lifestyle to alleviate those co morbid conditions, otherwise risk deterioration.       - RTC in 2 months           Patient advised that its their responsibility to follow up for care, studies, referrals and/or labs ordered today.       Please note that some or all of this report was generated using voice recognition software. Please notify me in case of any questions about the content of this document, as some errors in transcription may have occurred .

## 2024-12-03 NOTE — TELEPHONE ENCOUNTER
Medication:   Requested Prescriptions     Pending Prescriptions Disp Refills    ferrous gluconate (FERGON) 324 (38 Fe) MG tablet 180 tablet 0     Sig: TAKE 1 TABLET BY MOUTH 2 TIMES A DAY FOR ANEMIA    venlafaxine (EFFEXOR XR) 75 MG extended release capsule 90 capsule 3     Sig: TAKE 1 CAPSULE BY MOUTH EVERY DAY    Multiple Vitamins-Minerals (THERAPEUTIC MULTIVITAMIN-MINERALS) tablet 30 tablet 11     Sig: Take 1 tablet by mouth daily        Last Filled:    5/6/24,  Patient Phone Number: [phone] (home) [phone] (work)    Last appt: 8/28/2024   Next appt: Visit date not found    Last OARRS:        No data to display

## 2024-12-03 NOTE — TELEPHONE ENCOUNTER
Pt called and said her surgeon told her that the amount and potency of the multi - vitamin Dr. Randall was prescribing was not enough for her to take and should be taking more.

## 2024-12-04 ENCOUNTER — PATIENT MESSAGE (OUTPATIENT)
Dept: PRIMARY CARE CLINIC | Age: 54
End: 2024-12-04

## 2024-12-04 RX ORDER — ONDANSETRON 8 MG/1
8 TABLET, ORALLY DISINTEGRATING ORAL EVERY 8 HOURS PRN
Qty: 30 TABLET | Refills: 2 | Status: SHIPPED | OUTPATIENT
Start: 2024-12-04

## 2024-12-04 RX ORDER — M-VIT,TX,IRON,MINS/CALC/FOLIC 27MG-0.4MG
1 TABLET ORAL 2 TIMES DAILY
Qty: 60 TABLET | Refills: 8 | Status: SHIPPED | OUTPATIENT
Start: 2024-12-04 | End: 2026-05-28

## 2024-12-04 NOTE — TELEPHONE ENCOUNTER
I need to take the multivitamin twice a day so that I can get enough of what is supposed to be consumed  pended script is for 60 for 30 days previous one was for 30 for 30 days

## 2025-02-03 DIAGNOSIS — E55.9 VITAMIN D DEFICIENCY: Primary | ICD-10-CM

## 2025-02-03 RX ORDER — METHOCARBAMOL 750 MG/1
1 TABLET ORAL WEEKLY
Qty: 12 CAPSULE | OUTPATIENT
Start: 2025-02-03

## 2025-02-18 ENCOUNTER — OFFICE VISIT (OUTPATIENT)
Dept: BARIATRICS/WEIGHT MGMT | Age: 55
End: 2025-02-18
Payer: COMMERCIAL

## 2025-02-18 VITALS
HEART RATE: 86 BPM | HEIGHT: 65 IN | WEIGHT: 176 LBS | DIASTOLIC BLOOD PRESSURE: 81 MMHG | BODY MASS INDEX: 29.32 KG/M2 | SYSTOLIC BLOOD PRESSURE: 117 MMHG

## 2025-02-18 DIAGNOSIS — Z98.84 S/P LAPAROSCOPIC SLEEVE GASTRECTOMY: ICD-10-CM

## 2025-02-18 PROCEDURE — G8417 CALC BMI ABV UP PARAM F/U: HCPCS | Performed by: SURGERY

## 2025-02-18 PROCEDURE — 1036F TOBACCO NON-USER: CPT | Performed by: SURGERY

## 2025-02-18 PROCEDURE — 3017F COLORECTAL CA SCREEN DOC REV: CPT | Performed by: SURGERY

## 2025-02-18 PROCEDURE — G8427 DOCREV CUR MEDS BY ELIG CLIN: HCPCS | Performed by: SURGERY

## 2025-02-18 PROCEDURE — 99214 OFFICE O/P EST MOD 30 MIN: CPT | Performed by: SURGERY

## 2025-02-18 NOTE — PROGRESS NOTES
[facility]   [facility]  Delroy Kelly MD, FACS, [address], [address]    [facility] [address] .  Phone: [phone]  Fax: [phone]            Chief Complaint   Patient presents with    Bariatrics Post Op Follow Up     6mo s/p sleeve 24           HPI:    Joanne Cm is a very pleasant 54 y.o.  female , Body mass index is 29.29 kg/m².. And multiple medical problems who is presenting for bariatric follow up care.   Joanne is s/p laparoscopic sleeve gastrectomy by me 2024. Initial Weight: 225 lbs, Weight Loss: 49 lbs.   Comes today to the clinic without any complaints. Patient denies any nausea, vomiting, fevers, chills, shortness of breath, chest pain, constipation or urinary symptoms. Denies any heartburn nor dysphagia.  Patient informed us today that they are taking the multivitamins as instructed.  Patient denies any tingling, weakness,  numbness nor any neurological symptoms.  Joanne is feeling very well, and is very active. Patient is very pleased with the weight loss and resolution of co-morbid conditions.      Pain Assessment   Denies any abdominal pain     Past Medical History:   Diagnosis Date    Anxiety     Back pain     Chronic GERD 2024    Depression     Gastroenteritis     SVT (supraventricular tachycardia) (HCC)      Past Surgical History:   Procedure Laterality Date    CARDIAC ELECTROPHYSIOLOGY MAPPING AND ABLATION  2023     SECTION      FACIAL SURGERY N/A 2023    EXCISION OF SCALP LESION, POSSIBLE ADJACENT TISSUE TRANSFER performed by Carolyn Leon MD at [facility] OR    HYSTERECTOMY (CERVIX STATUS UNKNOWN)      HYSTERECTOMY, TOTAL ABDOMINAL (CERVIX REMOVED)      HYSTERECTOMY, VAGINAL      SLEEVE GASTRECTOMY N/A 2024    LAPAROSCOPIC SLEEVE GASTRECTOMY performed by Delroy Kelly MD at [facility] OR    UPPER GASTROINTESTINAL ENDOSCOPY N/A 2024    ESOPHAGOGASTRODUODENOSCOPY BIOPSY performed by Robin

## 2025-02-18 NOTE — PATIENT INSTRUCTIONS
Patient received dietary handouts and education.    Goals:   -  Add in protein based snack 1x/day  -  Increase exercise to 3x/week as tolerated

## 2025-02-18 NOTE — PROGRESS NOTES
Dietary Assessment Note      Vitals:   Vitals:    25 1233   BP: 117/81   Pulse: 86   Weight: 79.8 kg (176 lb)   Height: 1.651 m (5' 5\")    Patient lost 3 lbs over 3 mo.    Total Weight Loss: 49 lbs    Labs reviewed: no lab studies available for review at time of visit    Protein intake: 60-80 grams/day     Fluid intake: 48-64 oz/day    Multivitamin/mineral intake: yes taking OTC MVI     Calcium intake: yes 2 x/day     Other: none    Exercise:  45 min 2x/week at home     Nutrition Assessment: 6 mo post-op visit. Pt reports she does't eat a lot of pork/ beef per preference.     Breakfast: 1/2 cup CC + 1/2 English Muffin   Snack: none   Lunch: orange + salad w/ turkey, HB egg, kale & spinach w/ LF ranch  Snack: none  Dinner: 1/2 roast beef sandwich w/ swiss cheese & LF perry   Snack: none    Amount able to eat per sittin/2 - 1 cup     Following  rule: yes     Food Intolerances/issues:  none    Client Concerns: Pt concerned hair is thinning/falling out.    Goals:   -  Add in protein based snack 1x/day  -  Increase exercise to 3x/week as tolerated     Plan: Follow per provider and as needed     Kamilla Mattson RD

## 2025-02-21 DIAGNOSIS — Z98.84 S/P LAPAROSCOPIC SLEEVE GASTRECTOMY: ICD-10-CM

## 2025-04-08 ENCOUNTER — TELEPHONE (OUTPATIENT)
Dept: BARIATRICS/WEIGHT MGMT | Age: 55
End: 2025-04-08

## 2025-04-08 NOTE — TELEPHONE ENCOUNTER
Pt unavailable to speak at this time. States she will call back tomorrow to complete call ahead for 4/15/25. RD to reattempt if we do not hear back from her.

## 2025-04-09 ENCOUNTER — CLINICAL DOCUMENTATION (OUTPATIENT)
Dept: BARIATRICS/WEIGHT MGMT | Age: 55
End: 2025-04-09

## 2025-04-09 DIAGNOSIS — Z79.899 MEDICATION MANAGEMENT: ICD-10-CM

## 2025-04-09 LAB
25(OH)D3 SERPL-MCNC: 53.3 NG/ML
ALBUMIN SERPL-MCNC: 4.1 G/DL (ref 3.4–5)
ALBUMIN/GLOB SERPL: 1.5 {RATIO} (ref 1.1–2.2)
ALP SERPL-CCNC: 114 U/L (ref 40–129)
ALT SERPL-CCNC: 26 U/L (ref 10–40)
ANION GAP SERPL CALCULATED.3IONS-SCNC: 12 MMOL/L (ref 3–16)
AST SERPL-CCNC: 28 U/L (ref 15–37)
BASOPHILS # BLD: 0.1 K/UL (ref 0–0.2)
BASOPHILS NFR BLD: 1.5 %
BILIRUB SERPL-MCNC: 0.6 MG/DL (ref 0–1)
BUN SERPL-MCNC: 12 MG/DL (ref 7–20)
CALCIUM SERPL-MCNC: 9.9 MG/DL (ref 8.3–10.6)
CHLORIDE SERPL-SCNC: 101 MMOL/L (ref 99–110)
CHOLEST SERPL-MCNC: 168 MG/DL (ref 0–199)
CO2 SERPL-SCNC: 27 MMOL/L (ref 21–32)
CREAT SERPL-MCNC: 0.7 MG/DL (ref 0.6–1.1)
DEPRECATED RDW RBC AUTO: 13.2 % (ref 12.4–15.4)
EOSINOPHIL # BLD: 0.2 K/UL (ref 0–0.6)
EOSINOPHIL NFR BLD: 4.7 %
EST. AVERAGE GLUCOSE BLD GHB EST-MCNC: 79.6 MG/DL
FOLATE SERPL-MCNC: 35.8 NG/ML (ref 4.78–24.2)
GFR SERPLBLD CREATININE-BSD FMLA CKD-EPI: >90 ML/MIN/{1.73_M2}
GLUCOSE SERPL-MCNC: 77 MG/DL (ref 70–99)
HBA1C MFR BLD: 4.4 %
HCT VFR BLD AUTO: 44.6 % (ref 36–48)
HDLC SERPL-MCNC: 48 MG/DL (ref 40–60)
HGB BLD-MCNC: 14.7 G/DL (ref 12–16)
IRON SATN MFR SERPL: 27 % (ref 15–50)
IRON SERPL-MCNC: 68 UG/DL (ref 37–145)
LDLC SERPL CALC-MCNC: 107 MG/DL
LYMPHOCYTES # BLD: 1.9 K/UL (ref 1–5.1)
LYMPHOCYTES NFR BLD: 37.3 %
MCH RBC QN AUTO: 29.6 PG (ref 26–34)
MCHC RBC AUTO-ENTMCNC: 32.9 G/DL (ref 31–36)
MCV RBC AUTO: 89.8 FL (ref 80–100)
MONOCYTES # BLD: 0.4 K/UL (ref 0–1.3)
MONOCYTES NFR BLD: 7.1 %
NEUTROPHILS # BLD: 2.5 K/UL (ref 1.7–7.7)
NEUTROPHILS NFR BLD: 49.4 %
PLATELET # BLD AUTO: 211 K/UL (ref 135–450)
PMV BLD AUTO: 10 FL (ref 5–10.5)
POTASSIUM SERPL-SCNC: 4.4 MMOL/L (ref 3.5–5.1)
PROT SERPL-MCNC: 6.8 G/DL (ref 6.4–8.2)
RBC # BLD AUTO: 4.97 M/UL (ref 4–5.2)
SODIUM SERPL-SCNC: 140 MMOL/L (ref 136–145)
TIBC SERPL-MCNC: 254 UG/DL (ref 260–445)
TRIGL SERPL-MCNC: 66 MG/DL (ref 0–150)
TSH SERPL DL<=0.005 MIU/L-ACNC: 1.85 UIU/ML (ref 0.27–4.2)
VIT B12 SERPL-MCNC: 1165 PG/ML (ref 211–911)
VLDLC SERPL CALC-MCNC: 13 MG/DL
WBC # BLD AUTO: 5.1 K/UL (ref 4–11)

## 2025-04-09 NOTE — PROGRESS NOTES
Dietary Assessment Note  This eval was conducted via phone on 4/9/25 in preparation for their visit on 4/15/25 with Dr. Kelly.     Vitals: There were no vitals filed for this visit. Patient has not weighed herself.    Total Weight Loss: 49 lbs    Labs reviewed: no lab studies available for review at time of visit    Protein intake: Patient not tracking. Based on recall <60 g per day.     Fluid intake: 6-7 bottles of water, skim milk, OJ -discussed     Multivitamin/mineral intake: yes 2 OTC MVI    Calcium intake: yes 2 soft chews per day     Other: none    Exercise: home workouts 3x/week for 45 min (sit- up/crunches + weight lifting)     Nutrition Assessment: 8 mo post-op visit.     Breakfast:1/2 cup CC + toast   Snack: none  Lunch: pb&J sandwich   Snack: none  Dinner: salad w/ HB egg & shredded cheese w/ balsamic vinaigrette   Snack: none    Amount able to eat per sitting: ~ 1 cup    Following 30/30/30 rule: yes    Food Intolerances/issues:  beef and pork     Client Concerns: Hair loss/thinning     Goals:   - Add 1-2 protein based snacks  - Aim for 60-80 g of protein per day   - Start Biotin supp for hair loss   - Continue exercise plan     Plan: F/U per provider and as needed     Kamilla Mattson RD

## 2025-04-10 ENCOUNTER — RESULTS FOLLOW-UP (OUTPATIENT)
Dept: CARDIOLOGY CLINIC | Age: 55
End: 2025-04-10

## 2025-04-12 LAB
A-TOCOPHEROL VIT E SERPL-MCNC: 11.3 MG/L (ref 5.5–18)
ANNOTATION COMMENT IMP: NORMAL
BETA+GAMMA TOCOPHEROL SERPL-MCNC: 1.7 MG/L (ref 0–6)
RETINYL PALMITATE SERPL-MCNC: <0.02 MG/L (ref 0–0.1)
VIT A SERPL-MCNC: 0.45 MG/L (ref 0.3–1.2)

## 2025-04-13 LAB — VIT B1 BLD-MCNC: 186 NMOL/L (ref 70–180)

## 2025-04-15 ENCOUNTER — OFFICE VISIT (OUTPATIENT)
Dept: BARIATRICS/WEIGHT MGMT | Age: 55
End: 2025-04-15

## 2025-04-15 VITALS
WEIGHT: 165 LBS | HEART RATE: 80 BPM | RESPIRATION RATE: 18 BRPM | OXYGEN SATURATION: 96 % | HEIGHT: 65 IN | BODY MASS INDEX: 27.49 KG/M2 | DIASTOLIC BLOOD PRESSURE: 68 MMHG | SYSTOLIC BLOOD PRESSURE: 112 MMHG

## 2025-04-15 DIAGNOSIS — Z98.84 S/P LAPAROSCOPIC SLEEVE GASTRECTOMY: Primary | ICD-10-CM

## 2025-04-15 NOTE — PROGRESS NOTES
Advised the patient that not getting there weight under control, which hopefully would help with getting some of the comorbidities under control. That could increase risk of complications/worsening of those conditions on the long-term.  During Covid-19 pandemic, CDC and health authorities does classify obese patients as vulnerable and high risk as well.  Which makes weight loss a priority for improvement of their wellbeing and overall health.     We discussed how her excess weight affects her overall health and importance of weight loss, healthy diet and active lifestyle to alleviate those co morbid conditions, otherwise risk deterioration.       - RTC in 2 months           Patient advised that its their responsibility to follow up for care, studies, referrals and/or labs ordered today.       Please note that some or all of this report was generated using voice recognition software. Please notify me in case of any questions about the content of this document, as some errors in transcription may have occurred .

## 2025-05-02 NOTE — PROGRESS NOTES
Incentive Spirometry education and demonstration completed by Respiratory Therapy Yes      Response to education: Very Good     Teaching Time: 5 minutes    Minimum Predicted Vital Capacity - 593 mL.  Patient's Actual Vital Capacity - 1200 mL. Turning over to Nursing for routine follow-up Yes.       Electronically signed by Emiliano Del Valle RCP on 8/21/2024 at 4:16 PM     Initial (On Arrival)

## 2025-05-03 DIAGNOSIS — D50.0 IRON DEFICIENCY ANEMIA DUE TO CHRONIC BLOOD LOSS: ICD-10-CM

## 2025-05-05 RX ORDER — FERROUS GLUCONATE 324(38)MG
TABLET ORAL
Qty: 60 TABLET | Refills: 1 | Status: SHIPPED | OUTPATIENT
Start: 2025-05-05

## 2025-05-05 NOTE — TELEPHONE ENCOUNTER
Medication:   Requested Prescriptions     Pending Prescriptions Disp Refills    ferrous gluconate (FERGON) 324 (38 Fe) MG tablet [Pharmacy Med Name: FERROUS GLUCONATE 324 MG TAB] 60 tablet      Sig: TAKE 1 TABLET BY MOUTH 2 TIMES A DAY FOR ANEMIA        Last Filled:  12/3/24    Patient Phone Number: [phone] (home) [phone] (work)    Last appt: 8/28/2024 Return for Annual Wellness.  Next appt: Visit date not found    Last OARRS:        No data to display

## 2025-07-15 ENCOUNTER — CLINICAL DOCUMENTATION (OUTPATIENT)
Dept: BARIATRICS/WEIGHT MGMT | Age: 55
End: 2025-07-15

## 2025-07-15 NOTE — PROGRESS NOTES
/This eval was conducted via phone on 7/15/25 in preparation or their post-surgical visit on 7/22/25 with Dr. Kelly.     Dietary Assessment Note      Vitals: no new wt to report.    Labs reviewed:    Latest Reference Range & Units 04/09/25 09:38   Folate 4.78 - 24.20 ng/mL 35.80 (H)   (H): Data is abnormally high   Latest Reference Range & Units 04/09/25 09:38   Vitamin B-12 211 - 911 pg/mL 1165 (H)   (H): Data is abnormally high   Latest Reference Range & Units 04/09/25 09:38   Vitamin B1,Whole Blood 70 - 180 nmol/L 186 (H)   (H): Data is abnormally high   Latest Reference Range & Units 04/09/25 09:21   LDL Cholesterol <100 mg/dL 107 (H)   (H): Data is abnormally high    Protein intake: 60-80 grams/day     Fluid intake: >64 oz/day water, propel    Multivitamin/mineral intake: yes 2 OTC MVI     Calcium intake: yes 2 soft chews per day      Other: none    Exercise: 45-60 min walking 2X/week     Nutrition Assessment: 11 mos post-op visit.   B: HB egg + turkey sausage + applesauce  L: 1/2 turkey sandwich on whole wheat + grapes  D: turkey breast + brocc + sometimes 1/2 cup rice/kiwi/grapes/mandarin oranges OR  spinach salad     Amount able to eat per sitting: ~1 cup     Following 30/30/30 rule: yes, waits 30 min before and after eating    Food Intolerances/issues: none    Client Concerns: none    Goals:   - Continue diet and exercise plan    Plan: f/u per provider     PEPE PATEL, WILLIAM

## 2025-07-22 ENCOUNTER — OFFICE VISIT (OUTPATIENT)
Dept: BARIATRICS/WEIGHT MGMT | Age: 55
End: 2025-07-22

## 2025-07-22 VITALS
HEART RATE: 71 BPM | BODY MASS INDEX: 26.49 KG/M2 | DIASTOLIC BLOOD PRESSURE: 86 MMHG | WEIGHT: 159 LBS | SYSTOLIC BLOOD PRESSURE: 124 MMHG | HEIGHT: 65 IN | RESPIRATION RATE: 18 BRPM

## 2025-07-22 DIAGNOSIS — L72.9 SKIN CYST: ICD-10-CM

## 2025-07-22 DIAGNOSIS — Z98.84 S/P LAPAROSCOPIC SLEEVE GASTRECTOMY: Primary | ICD-10-CM

## 2025-07-22 RX ORDER — ALBUTEROL SULFATE 90 UG/1
2 INHALANT RESPIRATORY (INHALATION) PRN
COMMUNITY
Start: 2025-05-25

## 2025-07-22 RX ORDER — INHALER, ASSIST DEVICES
SPACER (EA) MISCELLANEOUS
COMMUNITY
Start: 2025-05-29

## 2025-07-22 NOTE — PROGRESS NOTES
[facility]   [facility]  Delroy Kelly MD, FACS, [address], [address]    [facility] [address] .  Phone: [phone]  Fax: [phone]            Chief Complaint   Patient presents with    Bariatrics Post Op Follow Up     11mo s/p sleeve 24           HPI:    Joanne Cm is a very pleasant 54 y.o.  female , Body mass index is 26.46 kg/m².. And multiple medical problems who is presenting for bariatric follow up care.   Joanne is s/p laparoscopic sleeve gastrectomy by me 2024. Initial Weight: 225 lbs, Weight Loss: 66 lbs.   Comes today to the clinic without any complaints. Patient denies any nausea, vomiting, fevers, chills, shortness of breath, chest pain, constipation or urinary symptoms. Denies any heartburn nor dysphagia.  Patient informed us today that they are taking the multivitamins as instructed.  Patient denies any tingling, weakness,  numbness nor any neurological symptoms.  Joanne is feeling very well, and is very active. Patient is very pleased with the weight loss and resolution of co-morbid conditions.      Pain Assessment   Denies any abdominal pain     Past Medical History:   Diagnosis Date    Anxiety     Back pain     Chronic GERD 2024    Depression     Gastroenteritis     SVT (supraventricular tachycardia)      Past Surgical History:   Procedure Laterality Date    CARDIAC ELECTROPHYSIOLOGY MAPPING AND ABLATION  2023     SECTION  1988    FACIAL SURGERY N/A 2023    EXCISION OF SCALP LESION, POSSIBLE ADJACENT TISSUE TRANSFER performed by Carolyn Leon MD at [facility] OR    HYSTERECTOMY (CERVIX STATUS UNKNOWN)      HYSTERECTOMY, TOTAL ABDOMINAL (CERVIX REMOVED)      HYSTERECTOMY, VAGINAL      SLEEVE GASTRECTOMY N/A 2024    LAPAROSCOPIC SLEEVE GASTRECTOMY performed by Delroy Kelly MD at [facility] OR    UPPER GASTROINTESTINAL ENDOSCOPY N/A 2024    ESOPHAGOGASTRODUODENOSCOPY BIOPSY performed by Delroy Kelly MD

## 2025-08-22 DIAGNOSIS — D50.0 IRON DEFICIENCY ANEMIA DUE TO CHRONIC BLOOD LOSS: ICD-10-CM

## 2025-08-25 RX ORDER — FERROUS GLUCONATE 324(38)MG
TABLET ORAL
Qty: 60 TABLET | Refills: 1 | OUTPATIENT
Start: 2025-08-25

## 2025-09-01 DIAGNOSIS — D50.0 IRON DEFICIENCY ANEMIA DUE TO CHRONIC BLOOD LOSS: ICD-10-CM

## 2025-09-02 RX ORDER — FERROUS GLUCONATE 324(38)MG
TABLET ORAL
Qty: 60 TABLET | Refills: 1 | OUTPATIENT
Start: 2025-09-02

## 2025-09-03 DIAGNOSIS — E55.9 VITAMIN D DEFICIENCY: ICD-10-CM

## 2025-09-03 DIAGNOSIS — D50.0 IRON DEFICIENCY ANEMIA DUE TO CHRONIC BLOOD LOSS: ICD-10-CM

## 2025-09-03 DIAGNOSIS — F33.1 MODERATE EPISODE OF RECURRENT MAJOR DEPRESSIVE DISORDER (HCC): ICD-10-CM

## 2025-09-03 RX ORDER — VENLAFAXINE HYDROCHLORIDE 75 MG/1
CAPSULE, EXTENDED RELEASE ORAL
Qty: 90 CAPSULE | Refills: 3 | OUTPATIENT
Start: 2025-09-03

## 2025-09-03 RX ORDER — ONDANSETRON 8 MG/1
8 TABLET, ORALLY DISINTEGRATING ORAL EVERY 8 HOURS PRN
Qty: 30 TABLET | Refills: 2 | OUTPATIENT
Start: 2025-09-03

## 2025-09-03 RX ORDER — CHOLECALCIFEROL (VITAMIN D3) 50 MCG
1 TABLET ORAL DAILY
Qty: 90 TABLET | Refills: 2 | Status: SHIPPED | OUTPATIENT
Start: 2025-09-03

## 2025-09-03 RX ORDER — M-VIT,TX,IRON,MINS/CALC/FOLIC 27MG-0.4MG
1 TABLET ORAL 2 TIMES DAILY
Qty: 60 TABLET | Refills: 8 | OUTPATIENT
Start: 2025-09-03 | End: 2027-02-25

## 2025-09-03 RX ORDER — FERROUS GLUCONATE 324(38)MG
TABLET ORAL
Qty: 60 TABLET | Refills: 1 | OUTPATIENT
Start: 2025-09-03

## (undated) DEVICE — CRADLE ANK AND FT ELEV FLAT END POLY FOAM W/ VENT H NEUT

## (undated) DEVICE — GLOVE ORANGE PI 8 1/2   MSG9085

## (undated) DEVICE — BW-412T DISP COMBO CLEANING BRUSH: Brand: SINGLE USE COMBINATION CLEANING BRUSH

## (undated) DEVICE — SINGLE USE AIR/WATER, SUCTION AND BIOPSY VALVES SET: Brand: ORCAPOD™

## (undated) DEVICE — APPLICATOR PREP 26ML 0.7% IOD POVACRYLEX 74% ISO ALC ST

## (undated) DEVICE — STAPLER 60MM POWERED ECHELON 3000 LONG 440MM

## (undated) DEVICE — TROCAR: Brand: KII OPTICAL ACCESS SYSTEM

## (undated) DEVICE — SHEARS ENDOSCP HARM 36CM ULTRASONIC CRV TIP UPGRD

## (undated) DEVICE — AIR SHEET,LAT,COMFORT GLIDE, BLEND 40X80: Brand: MEDLINE

## (undated) DEVICE — SOLUTION IRRIG 1000ML 0.9% SOD CHL USP POUR PLAS BTL

## (undated) DEVICE — ADHESIVE SKIN CLSR 0.7ML TOP DERMBND ADV

## (undated) DEVICE — AIR/WATER CLEANING ADAPTER FOR OLYMPUS® GI ENDOSCOPE: Brand: BULLDOG®

## (undated) DEVICE — TRUE CONTENT TO BE POPULATED AS PART OF REBRANDING: Brand: ARGYLE

## (undated) DEVICE — DEVICE SUT W/ SZ 0 L48IN VLT POLYSRB SUT DISP ES-9 ENDO

## (undated) DEVICE — DEVICE SUT SHFT L34CM DIA 10MM 2 JAW LD UNIT ENDOSTCH

## (undated) DEVICE — SOLUTION ANTIFOG VIS SYS CLEARIFY LAPSCP

## (undated) DEVICE — BLANKET WRM W29.9XL79.1IN UP BODY FORC AIR MISTRAL-AIR

## (undated) DEVICE — TROCARS: Brand: KII® OPTICAL ACCESS SYSTEM

## (undated) DEVICE — SYRINGE,10ML,TR/FR,MLL,W/RES: Brand: NAMIC

## (undated) DEVICE — RELOAD STPL L60MM H1.5-3.6MM REG TISS BLU GRIPPING SURF B

## (undated) DEVICE — LAP SLEEVE: Brand: MEDLINE INDUSTRIES, INC.

## (undated) DEVICE — SOLUTION IV IRRIG WATER 500ML POUR BRL ST 2F7113

## (undated) DEVICE — ENDOSCOPIC KIT 6X3/16 FT COLON W/ 1.1 OZ 2 GWN W/O BRSH

## (undated) DEVICE — LIQUIBAND RAPID ADHESIVE 36/CS 0.8ML: Brand: MEDLINE

## (undated) DEVICE — FORCEPS BX L240CM WRK CHN 2.8MM STD CAP W/ NDL MIC MESH

## (undated) DEVICE — TROCAR: Brand: KII FIOS FIRST ENTRY

## (undated) DEVICE — LAPAROSCOPIC SCISSORS: Brand: EPIX LAPAROSCOPIC SCISSORS

## (undated) DEVICE — SPONGE,LAP,4"X18",XR,ST,5/PK,40PK/CS: Brand: MEDLINE INDUSTRIES, INC.

## (undated) DEVICE — RELOAD STPL L60MM H1-2.6MM MESENTERY THN TISS WHT 6 ROW

## (undated) DEVICE — Device

## (undated) DEVICE — TUBING, SUCTION, 1/4" X 10', STRAIGHT: Brand: MEDLINE

## (undated) DEVICE — MOUTHPIECE ENDOSCP L CTRL OPN AND SIDE PORTS DISP

## (undated) DEVICE — PASSIVE LAPSCP FLTR W/ 1/4INX24 TBNG AND M LUER LCK FIT - U